# Patient Record
Sex: FEMALE | Race: WHITE | NOT HISPANIC OR LATINO | Employment: FULL TIME | URBAN - NONMETROPOLITAN AREA
[De-identification: names, ages, dates, MRNs, and addresses within clinical notes are randomized per-mention and may not be internally consistent; named-entity substitution may affect disease eponyms.]

---
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
## 2018-01-05 ENCOUNTER — HOSPITAL ENCOUNTER (OUTPATIENT)
Facility: HOSPITAL | Age: 18
Discharge: HOME | End: 2018-01-05
Attending: OBSTETRICS & GYNECOLOGY | Admitting: OBSTETRICS & GYNECOLOGY

## 2018-01-05 VITALS
RESPIRATION RATE: 18 BRPM | HEART RATE: 78 BPM | SYSTOLIC BLOOD PRESSURE: 113 MMHG | HEIGHT: 70 IN | OXYGEN SATURATION: 100 % | WEIGHT: 149.5 LBS | TEMPERATURE: 98.2 F | BODY MASS INDEX: 21.4 KG/M2 | DIASTOLIC BLOOD PRESSURE: 62 MMHG

## 2018-01-05 PROBLEM — R10.9 ABDOMINAL PAIN AFFECTING PREGNANCY: Status: ACTIVE | Noted: 2018-01-05

## 2018-01-05 PROBLEM — O26.899 ABDOMINAL PAIN AFFECTING PREGNANCY: Status: ACTIVE | Noted: 2018-01-05

## 2018-01-05 PROCEDURE — G0463 HOSPITAL OUTPT CLINIC VISIT: HCPCS

## 2018-01-05 PROCEDURE — 99218 PR INITIAL OBSERVATION CARE/DAY 30 MINUTES: CPT | Performed by: OBSTETRICS & GYNECOLOGY

## 2018-01-05 PROCEDURE — 76819 FETAL BIOPHYS PROFIL W/O NST: CPT | Performed by: OBSTETRICS & GYNECOLOGY

## 2018-01-05 RX ORDER — FERROUS SULFATE 325(65) MG
325 TABLET ORAL 2 TIMES DAILY
COMMUNITY
End: 2018-06-26

## 2018-01-05 RX ORDER — NITROFURANTOIN MACROCRYSTALS 100 MG/1
100 CAPSULE ORAL 2 TIMES DAILY
COMMUNITY
End: 2018-03-23 | Stop reason: HOSPADM

## 2018-01-06 NOTE — NURSING NOTE
Patient arrived with complaints of upper abdominal pain that is relieved after vomiting.  Patient reports vomiting 4 times today, but tolerating water well.

## 2018-01-06 NOTE — PROGRESS NOTES
Maya Rodriguez  : 2000  MRN: [id number]  CSN: [id number]    Antepartum Progress Note    Subjective   Lamar Rodriguez is a 17 y.o. year old  with an Estimated Date of Delivery: 3/26/18 currently at 28w4d who initially presented with nausea, vomiting and pain in upper abdomen..  The patient currently reports symptoms improved.  She has not experienced emesis since arrival and says the pain in her upper abdomen has almost completely resolved.  The patient started iron supplementation and abx for a uti both within the past few days.  In addition, the patient also works in a  and has had multiple sick contacts - although she does not have body aches or diarrhea.    Prenatal care has been with Dr. Tammi Talbert.  It has been benign.         Objective     Min/max vitals past 24 hours:   Temp  Min: 98.2 °F (36.8 °C)  Max: 98.2 °F (36.8 °C)  BP  Min: 112/66  Max: 113/62  Pulse  Min: 78  Max: 80  Resp  Min: 18  Max: 18         General: well developed; well nourished  no acute distress  appears stated age   Heart: Not performed.   Lungs: breathing is unlabored   Abdomen: soft, non-tender; no masses  fundus firm and non-tender   FHT's: reassuring and reactive  125 +accels, no decels, moderate variability   Cervix: was not checked.   Contractions: none on monitor   Back: bilateral CVA tenderness is absent           Assessment   1. IUP at 28w4d  2.  Abd pain now resolved.  Patient declines offer for anti-emetic  3.  Patient and her parents advised that etiology could be reaction to antibiotic, effects of iron supplementation, or gastroenteritis.  In addition, patient reports constipation.  Reassured patient that her baby looks great on monitor and that she is not having any contractions.  Patient obviously relieved.     Plan   1. OK for discharge home    Jeny Hirsch MD  2018  9:24 PM

## 2018-02-20 LAB — EXTERNAL GROUP B STREP ANTIGEN: NEGATIVE

## 2018-03-08 ENCOUNTER — HOSPITAL ENCOUNTER (OUTPATIENT)
Facility: HOSPITAL | Age: 18
Setting detail: OBSERVATION
Discharge: HOME | End: 2018-03-08
Attending: OBSTETRICS & GYNECOLOGY | Admitting: OBSTETRICS & GYNECOLOGY

## 2018-03-08 VITALS
WEIGHT: 175.38 LBS | DIASTOLIC BLOOD PRESSURE: 69 MMHG | HEART RATE: 79 BPM | BODY MASS INDEX: 25.11 KG/M2 | SYSTOLIC BLOOD PRESSURE: 125 MMHG | HEIGHT: 70 IN | RESPIRATION RATE: 16 BRPM | TEMPERATURE: 97.2 F

## 2018-03-08 PROCEDURE — G0378 HOSPITAL OBSERVATION PER HR: HCPCS

## 2018-03-08 PROCEDURE — G0463 HOSPITAL OUTPT CLINIC VISIT: HCPCS

## 2018-03-09 PROBLEM — Z34.90 PREGNANCY: Status: ACTIVE | Noted: 2018-03-09

## 2018-03-19 ENCOUNTER — LAB REQUISITION (OUTPATIENT)
Dept: LAB | Facility: HOSPITAL | Age: 18
End: 2018-03-19

## 2018-03-19 DIAGNOSIS — Z00.00 ENCOUNTER FOR GENERAL ADULT MEDICAL EXAMINATION WITHOUT ABNORMAL FINDINGS: ICD-10-CM

## 2018-03-19 LAB
ABO GROUP BLD: NORMAL
BASOPHILS # BLD AUTO: 0.03 10*3/MM3 (ref 0–0.2)
BASOPHILS NFR BLD AUTO: 0.3 % (ref 0–2)
BLD GP AB SCN SERPL QL: NEGATIVE
DEPRECATED RDW RBC AUTO: 48.4 FL (ref 40–54)
EOSINOPHIL # BLD AUTO: 0.13 10*3/MM3 (ref 0–0.7)
EOSINOPHIL NFR BLD AUTO: 1.3 % (ref 0–4)
ERYTHROCYTE [DISTWIDTH] IN BLOOD BY AUTOMATED COUNT: 14.9 % (ref 12–15)
HCT VFR BLD AUTO: 37.4 % (ref 37–47)
HGB BLD-MCNC: 12.1 G/DL (ref 12–16)
IMM GRANULOCYTES # BLD: 0.03 10*3/MM3 (ref 0–0.03)
IMM GRANULOCYTES NFR BLD: 0.3 % (ref 0–5)
LYMPHOCYTES # BLD AUTO: 1.55 10*3/MM3 (ref 0.72–4.86)
LYMPHOCYTES NFR BLD AUTO: 15.2 % (ref 15–45)
MCH RBC QN AUTO: 28.9 PG (ref 28–32)
MCHC RBC AUTO-ENTMCNC: 32.4 G/DL (ref 33–36)
MCV RBC AUTO: 89.5 FL (ref 82–98)
MONOCYTES # BLD AUTO: 0.93 10*3/MM3 (ref 0.19–1.3)
MONOCYTES NFR BLD AUTO: 9.1 % (ref 4–12)
NEUTROPHILS # BLD AUTO: 7.53 10*3/MM3 (ref 1.87–8.4)
NEUTROPHILS NFR BLD AUTO: 73.8 % (ref 39–78)
NRBC BLD MANUAL-RTO: 0 /100 WBC (ref 0–0)
PLATELET # BLD AUTO: 246 10*3/MM3 (ref 130–400)
PMV BLD AUTO: 10.5 FL (ref 6–12)
RBC # BLD AUTO: 4.18 10*6/MM3 (ref 4.2–5.4)
RH BLD: POSITIVE
WBC NRBC COR # BLD: 10.2 10*3/MM3 (ref 4.8–10.8)

## 2018-03-19 PROCEDURE — 86900 BLOOD TYPING SEROLOGIC ABO: CPT | Performed by: OBSTETRICS & GYNECOLOGY

## 2018-03-19 PROCEDURE — 86850 RBC ANTIBODY SCREEN: CPT | Performed by: OBSTETRICS & GYNECOLOGY

## 2018-03-19 PROCEDURE — 85025 COMPLETE CBC W/AUTO DIFF WBC: CPT | Performed by: OBSTETRICS & GYNECOLOGY

## 2018-03-19 PROCEDURE — 86901 BLOOD TYPING SEROLOGIC RH(D): CPT | Performed by: OBSTETRICS & GYNECOLOGY

## 2018-03-20 ENCOUNTER — HOSPITAL ENCOUNTER (INPATIENT)
Facility: HOSPITAL | Age: 18
LOS: 3 days | Discharge: HOME | End: 2018-03-23
Attending: OBSTETRICS & GYNECOLOGY | Admitting: OBSTETRICS & GYNECOLOGY

## 2018-03-20 ENCOUNTER — HOSPITAL ENCOUNTER (OUTPATIENT)
Dept: LABOR AND DELIVERY | Facility: HOSPITAL | Age: 18
Discharge: HOME | End: 2018-03-20

## 2018-03-20 PROBLEM — Z34.90 TERM PREGNANCY: Status: ACTIVE | Noted: 2018-03-20

## 2018-03-20 RX ORDER — MISOPROSTOL 200 UG/1
800 TABLET ORAL AS NEEDED
Status: CANCELLED | OUTPATIENT
Start: 2018-03-20

## 2018-03-20 RX ORDER — METHYLERGONOVINE MALEATE 0.2 MG/ML
200 INJECTION INTRAVENOUS ONCE AS NEEDED
Status: CANCELLED | OUTPATIENT
Start: 2018-03-20

## 2018-03-20 RX ORDER — OXYTOCIN/RINGER'S LACTATE 20/1000 ML
999 PLASTIC BAG, INJECTION (ML) INTRAVENOUS ONCE
Status: COMPLETED | OUTPATIENT
Start: 2018-03-21 | End: 2018-03-21

## 2018-03-20 RX ORDER — LIDOCAINE HYDROCHLORIDE 10 MG/ML
5 INJECTION, SOLUTION EPIDURAL; INFILTRATION; INTRACAUDAL; PERINEURAL AS NEEDED
Status: DISCONTINUED | OUTPATIENT
Start: 2018-03-20 | End: 2018-03-21

## 2018-03-20 RX ORDER — TERBUTALINE SULFATE 1 MG/ML
0.25 INJECTION, SOLUTION SUBCUTANEOUS AS NEEDED
Status: CANCELLED | OUTPATIENT
Start: 2018-03-20

## 2018-03-20 RX ORDER — LIDOCAINE HYDROCHLORIDE 10 MG/ML
5 INJECTION, SOLUTION EPIDURAL; INFILTRATION; INTRACAUDAL; PERINEURAL AS NEEDED
Status: CANCELLED | OUTPATIENT
Start: 2018-03-20

## 2018-03-20 RX ORDER — OXYTOCIN/0.9 % SODIUM CHLORIDE 30/500 ML
2 PLASTIC BAG, INJECTION (ML) INTRAVENOUS
Status: CANCELLED | OUTPATIENT
Start: 2018-03-20 | End: 2018-03-21

## 2018-03-20 RX ORDER — TERBUTALINE SULFATE 1 MG/ML
0.25 INJECTION, SOLUTION SUBCUTANEOUS AS NEEDED
Status: DISCONTINUED | OUTPATIENT
Start: 2018-03-20 | End: 2018-03-21

## 2018-03-20 RX ORDER — SODIUM CHLORIDE 0.9 % (FLUSH) 0.9 %
1-10 SYRINGE (ML) INJECTION AS NEEDED
Status: DISCONTINUED | OUTPATIENT
Start: 2018-03-20 | End: 2018-03-21 | Stop reason: HOSPADM

## 2018-03-20 RX ORDER — SODIUM CHLORIDE, SODIUM LACTATE, POTASSIUM CHLORIDE, CALCIUM CHLORIDE 600; 310; 30; 20 MG/100ML; MG/100ML; MG/100ML; MG/100ML
125 INJECTION, SOLUTION INTRAVENOUS CONTINUOUS
Status: DISCONTINUED | OUTPATIENT
Start: 2018-03-20 | End: 2018-03-23 | Stop reason: HOSPADM

## 2018-03-20 RX ORDER — OXYTOCIN/RINGER'S LACTATE 20/1000 ML
999 PLASTIC BAG, INJECTION (ML) INTRAVENOUS ONCE
Status: CANCELLED | OUTPATIENT
Start: 2018-03-21

## 2018-03-20 RX ORDER — ZOLPIDEM TARTRATE 5 MG/1
5 TABLET ORAL NIGHTLY PRN
Status: DISCONTINUED | OUTPATIENT
Start: 2018-03-20 | End: 2018-03-21 | Stop reason: HOSPADM

## 2018-03-20 RX ORDER — OXYTOCIN/0.9 % SODIUM CHLORIDE 30/500 ML
2-30 PLASTIC BAG, INJECTION (ML) INTRAVENOUS
Status: DISPENSED | OUTPATIENT
Start: 2018-03-20 | End: 2018-03-21

## 2018-03-20 RX ORDER — ZOLPIDEM TARTRATE 5 MG/1
5 TABLET ORAL NIGHTLY PRN
Status: CANCELLED | OUTPATIENT
Start: 2018-03-20 | End: 2018-03-30

## 2018-03-20 RX ORDER — SODIUM CHLORIDE 0.9 % (FLUSH) 0.9 %
1-10 SYRINGE (ML) INJECTION AS NEEDED
Status: CANCELLED | OUTPATIENT
Start: 2018-03-20

## 2018-03-20 RX ORDER — CARBOPROST TROMETHAMINE 250 UG/ML
250 INJECTION, SOLUTION INTRAMUSCULAR AS NEEDED
Status: CANCELLED | OUTPATIENT
Start: 2018-03-20

## 2018-03-20 RX ADMIN — OXYTOCIN-SODIUM CHLORIDE 0.9% IV SOLN 30 UNIT/500ML 2 MILLI-UNITS/MIN: 30-0.9/5 SOLUTION at 21:38

## 2018-03-20 RX ADMIN — SODIUM CHLORIDE, POTASSIUM CHLORIDE, SODIUM LACTATE AND CALCIUM CHLORIDE 125 ML/HR: 600; 310; 30; 20 INJECTION, SOLUTION INTRAVENOUS at 21:22

## 2018-03-20 NOTE — H&P
"        HPI:  This is a 27 year old female who presents for induction of labor at 39+ weeks gestation.  Patient admitted for low dose pitocin cervical ripening.  Prenatal course complicated by S<<D; probable SGA vs IUGR-  Weekly fetal monitoring and interval growth scans.  Other issue during last trimester, gestational edema with proteinuria.  Outpatient 24 hour urine protein received today 700 mg  Other pre eclamptic labs within normal values  Blood pressure stable  Risks, benefits and alternatives of delivery at this time discussed with patient and family with all questions answered.  History of Present Illness    Review of Systems   Constitutional: Negative.    HENT: Negative.    Eyes: Negative.    Respiratory: Negative.    Cardiovascular: Positive for leg swelling.   Gastrointestinal: Negative.    Endocrine: Negative.    Genitourinary: Negative.    Musculoskeletal: Negative.    Skin: Negative.    Allergic/Immunologic: Negative.    Neurological: Negative.    Hematological: Negative.    Psychiatric/Behavioral: Negative.         Past Medical History:   Diagnosis Date   • Chlamydia     prior to pregnancy     Surgical History:  [place] teeth extractions  Family History:  CAD  Ob History:  G1  Social History   Substance Use Topics   • Smoking status: Never Smoker   • Smokeless tobacco: Never Used   • Alcohol use No   NKDA  Medications:  PNV  Objective      Vital Signs  BP:  108/74  P:  90  R:  18  Ht:  70\"  Wt:  179       Physical Exam   Constitutional: She is oriented to person, place, and time. She appears well-developed and well-nourished.   HENT:   Head: Normocephalic.   Eyes: EOM are normal.   Neck: Normal range of motion. Neck supple.   Cardiovascular: Normal rate, regular rhythm, normal heart sounds and intact distal pulses.    Pulmonary/Chest: Effort normal and breath sounds normal.   Abdominal: Soft. Bowel sounds are normal. She exhibits no mass. There is no tenderness. There is no rebound and no guarding. No " hernia.   Gravid abdomen  Non tender  S<<D    Genitourinary: Vagina normal.   Genitourinary Comments: Bimanual exam:  2-3/80/-1   Head well applied to cervix   Musculoskeletal: She exhibits edema.   Neurological: She is alert and oriented to person, place, and time. She has normal reflexes.   Skin: Skin is warm.   Psychiatric: She has a normal mood and affect. Her behavior is normal. Judgment and thought content normal.   Vitals reviewed.      Results Review:  GBS negative    Assessment & Plan     Assessment:  1.  Intrauterine pregnancy at 39 1/7 weeks gestation  2.  Favorable cervix  3.  S<D probable SGA vs IUGR interval growth documented  4.  Negative GBS    Plan:  1.  Admit  2.  Induction of labor      Tammi Talbert DO  03/20/18  8:57 AM

## 2018-03-21 ENCOUNTER — ANESTHESIA EVENT (OUTPATIENT)
Dept: LABOR AND DELIVERY | Facility: HOSPITAL | Age: 18
End: 2018-03-21

## 2018-03-21 ENCOUNTER — ANESTHESIA (OUTPATIENT)
Dept: LABOR AND DELIVERY | Facility: HOSPITAL | Age: 18
End: 2018-03-21

## 2018-03-21 LAB — GLUCOSE BLDC GLUCOMTR-MCNC: 64 MG/DL (ref 70–130)

## 2018-03-21 PROCEDURE — 25010000002 ROPIVACAINE PER 1 MG: Performed by: NURSE ANESTHETIST, CERTIFIED REGISTERED

## 2018-03-21 PROCEDURE — 51703 INSERT BLADDER CATH COMPLEX: CPT

## 2018-03-21 PROCEDURE — 10907ZC DRAINAGE OF AMNIOTIC FLUID, THERAPEUTIC FROM PRODUCTS OF CONCEPTION, VIA NATURAL OR ARTIFICIAL OPENING: ICD-10-PCS | Performed by: OBSTETRICS & GYNECOLOGY

## 2018-03-21 PROCEDURE — C1755 CATHETER, INTRASPINAL: HCPCS | Performed by: NURSE ANESTHETIST, CERTIFIED REGISTERED

## 2018-03-21 PROCEDURE — 25010000002 FENTANYL CITRATE (PF) 100 MCG/2ML SOLUTION: Performed by: NURSE ANESTHETIST, CERTIFIED REGISTERED

## 2018-03-21 PROCEDURE — 0KQM0ZZ REPAIR PERINEUM MUSCLE, OPEN APPROACH: ICD-10-PCS | Performed by: OBSTETRICS & GYNECOLOGY

## 2018-03-21 PROCEDURE — 82962 GLUCOSE BLOOD TEST: CPT

## 2018-03-21 RX ORDER — FENTANYL CITRATE 50 UG/ML
INJECTION, SOLUTION INTRAMUSCULAR; INTRAVENOUS AS NEEDED
Status: DISCONTINUED | OUTPATIENT
Start: 2018-03-21 | End: 2018-03-22 | Stop reason: SURG

## 2018-03-21 RX ORDER — DEXTROSE, SODIUM CHLORIDE, SODIUM LACTATE, POTASSIUM CHLORIDE, AND CALCIUM CHLORIDE 5; .6; .31; .03; .02 G/100ML; G/100ML; G/100ML; G/100ML; G/100ML
999 INJECTION, SOLUTION INTRAVENOUS ONCE
Status: COMPLETED | OUTPATIENT
Start: 2018-03-21 | End: 2018-03-21

## 2018-03-21 RX ORDER — OXYCODONE HYDROCHLORIDE AND ACETAMINOPHEN 5; 325 MG/1; MG/1
TABLET ORAL
Status: COMPLETED
Start: 2018-03-21 | End: 2018-03-21

## 2018-03-21 RX ORDER — LIDOCAINE HYDROCHLORIDE AND EPINEPHRINE 15; 5 MG/ML; UG/ML
INJECTION, SOLUTION EPIDURAL AS NEEDED
Status: DISCONTINUED | OUTPATIENT
Start: 2018-03-21 | End: 2018-03-22 | Stop reason: SURG

## 2018-03-21 RX ORDER — SODIUM CHLORIDE, SODIUM LACTATE, POTASSIUM CHLORIDE, CALCIUM CHLORIDE 600; 310; 30; 20 MG/100ML; MG/100ML; MG/100ML; MG/100ML
INJECTION, SOLUTION INTRAVENOUS CONTINUOUS PRN
Status: DISCONTINUED | OUTPATIENT
Start: 2018-03-21 | End: 2018-03-22 | Stop reason: SURG

## 2018-03-21 RX ORDER — ONDANSETRON 2 MG/ML
4 INJECTION INTRAMUSCULAR; INTRAVENOUS EVERY 6 HOURS PRN
Status: DISCONTINUED | OUTPATIENT
Start: 2018-03-21 | End: 2018-03-22

## 2018-03-21 RX ORDER — METHYLERGONOVINE MALEATE 0.2 MG/ML
200 INJECTION INTRAVENOUS ONCE AS NEEDED
Status: DISCONTINUED | OUTPATIENT
Start: 2018-03-21 | End: 2018-03-21 | Stop reason: HOSPADM

## 2018-03-21 RX ORDER — METHYLERGONOVINE MALEATE 0.2 MG/ML
200 INJECTION INTRAVENOUS ONCE
Status: DISCONTINUED | OUTPATIENT
Start: 2018-03-21 | End: 2018-03-22

## 2018-03-21 RX ORDER — BISACODYL 10 MG
10 SUPPOSITORY, RECTAL RECTAL DAILY PRN
Status: DISCONTINUED | OUTPATIENT
Start: 2018-03-22 | End: 2018-03-23 | Stop reason: HOSPADM

## 2018-03-21 RX ORDER — CARBOPROST TROMETHAMINE 250 UG/ML
250 INJECTION, SOLUTION INTRAMUSCULAR AS NEEDED
Status: DISCONTINUED | OUTPATIENT
Start: 2018-03-21 | End: 2018-03-21 | Stop reason: HOSPADM

## 2018-03-21 RX ORDER — OXYCODONE HYDROCHLORIDE AND ACETAMINOPHEN 5; 325 MG/1; MG/1
1 TABLET ORAL EVERY 4 HOURS PRN
Status: DISCONTINUED | OUTPATIENT
Start: 2018-03-21 | End: 2018-03-23 | Stop reason: HOSPADM

## 2018-03-21 RX ORDER — ROPIVACAINE HYDROCHLORIDE 2 MG/ML
INJECTION, SOLUTION EPIDURAL; INFILTRATION; PERINEURAL AS NEEDED
Status: DISCONTINUED | OUTPATIENT
Start: 2018-03-21 | End: 2018-03-22 | Stop reason: SURG

## 2018-03-21 RX ORDER — MISOPROSTOL 200 UG/1
600 TABLET ORAL ONCE
Status: DISCONTINUED | OUTPATIENT
Start: 2018-03-21 | End: 2018-03-22

## 2018-03-21 RX ORDER — SODIUM CHLORIDE 0.9 % (FLUSH) 0.9 %
1-10 SYRINGE (ML) INJECTION AS NEEDED
Status: DISCONTINUED | OUTPATIENT
Start: 2018-03-21 | End: 2018-03-22

## 2018-03-21 RX ORDER — IBUPROFEN 800 MG/1
800 TABLET ORAL EVERY 6 HOURS PRN
Status: DISCONTINUED | OUTPATIENT
Start: 2018-03-21 | End: 2018-03-23 | Stop reason: HOSPADM

## 2018-03-21 RX ORDER — MISOPROSTOL 200 UG/1
800 TABLET ORAL AS NEEDED
Status: DISCONTINUED | OUTPATIENT
Start: 2018-03-21 | End: 2018-03-21 | Stop reason: HOSPADM

## 2018-03-21 RX ORDER — CARBOPROST TROMETHAMINE 250 UG/ML
250 INJECTION, SOLUTION INTRAMUSCULAR ONCE
Status: DISCONTINUED | OUTPATIENT
Start: 2018-03-21 | End: 2018-03-22

## 2018-03-21 RX ORDER — EPHEDRINE SULFATE 50 MG/ML
5 INJECTION, SOLUTION INTRAVENOUS
Status: DISCONTINUED | OUTPATIENT
Start: 2018-03-21 | End: 2018-03-21

## 2018-03-21 RX ORDER — ONDANSETRON 4 MG/1
4 TABLET, FILM COATED ORAL EVERY 8 HOURS PRN
Status: DISCONTINUED | OUTPATIENT
Start: 2018-03-21 | End: 2018-03-23 | Stop reason: HOSPADM

## 2018-03-21 RX ORDER — ACETAMINOPHEN 325 MG/1
650 TABLET ORAL EVERY 4 HOURS PRN
Status: DISCONTINUED | OUTPATIENT
Start: 2018-03-21 | End: 2018-03-23 | Stop reason: HOSPADM

## 2018-03-21 RX ADMIN — SODIUM CHLORIDE, POTASSIUM CHLORIDE, SODIUM LACTATE AND CALCIUM CHLORIDE: 600; 310; 30; 20 INJECTION, SOLUTION INTRAVENOUS at 09:10

## 2018-03-21 RX ADMIN — OXYTOCIN 999 ML/HR: 10 INJECTION INTRAVENOUS at 18:44

## 2018-03-21 RX ADMIN — SODIUM CHLORIDE, POTASSIUM CHLORIDE, SODIUM LACTATE AND CALCIUM CHLORIDE 125 ML/HR: 600; 310; 30; 20 INJECTION, SOLUTION INTRAVENOUS at 09:30

## 2018-03-21 RX ADMIN — OXYCODONE HYDROCHLORIDE AND ACETAMINOPHEN 1 TABLET: 5; 325 TABLET ORAL at 21:17

## 2018-03-21 RX ADMIN — FENTANYL CITRATE 18 MCG: 50 INJECTION INTRAMUSCULAR; INTRAVENOUS at 09:40

## 2018-03-21 RX ADMIN — ROPIVACAINE HYDROCHLORIDE 250 ML/HR: 2 INJECTION, SOLUTION EPIDURAL; INFILTRATION at 09:48

## 2018-03-21 RX ADMIN — SODIUM CHLORIDE, POTASSIUM CHLORIDE, SODIUM LACTATE AND CALCIUM CHLORIDE 125 ML/HR: 600; 310; 30; 20 INJECTION, SOLUTION INTRAVENOUS at 23:39

## 2018-03-21 RX ADMIN — SODIUM CHLORIDE, POTASSIUM CHLORIDE, SODIUM LACTATE AND CALCIUM CHLORIDE 125 ML/HR: 600; 310; 30; 20 INJECTION, SOLUTION INTRAVENOUS at 10:02

## 2018-03-21 RX ADMIN — SODIUM CHLORIDE, SODIUM LACTATE, POTASSIUM CHLORIDE, CALCIUM CHLORIDE AND DEXTROSE MONOHYDRATE 999 ML/HR: 5; 600; 310; 30; 20 INJECTION, SOLUTION INTRAVENOUS at 14:34

## 2018-03-21 RX ADMIN — ROPIVACAINE HYDROCHLORIDE 9 ML: 2 INJECTION, SOLUTION EPIDURAL; INFILTRATION at 09:37

## 2018-03-21 RX ADMIN — SODIUM CHLORIDE, POTASSIUM CHLORIDE, SODIUM LACTATE AND CALCIUM CHLORIDE 125 ML/HR: 600; 310; 30; 20 INJECTION, SOLUTION INTRAVENOUS at 08:46

## 2018-03-21 RX ADMIN — LIDOCAINE HYDROCHLORIDE AND EPINEPHRINE 5 ML: 15; 5 INJECTION, SOLUTION EPIDURAL at 09:36

## 2018-03-21 RX ADMIN — FENTANYL CITRATE 482 MCG: 50 INJECTION INTRAMUSCULAR; INTRAVENOUS at 09:50

## 2018-03-21 RX ADMIN — LIDOCAINE HYDROCHLORIDE 5 ML: 10 INJECTION, SOLUTION EPIDURAL; INFILTRATION; INTRACAUDAL; PERINEURAL at 09:13

## 2018-03-21 NOTE — PLAN OF CARE
Problem: Patient Care Overview  Goal: Plan of Care Review  Outcome: Ongoing (interventions implemented as appropriate)   03/21/18 0459   Coping/Psychosocial   Plan of Care Reviewed With patient;mother   Plan of Care Review   Progress no change     Goal: Individualization and Mutuality  Outcome: Ongoing (interventions implemented as appropriate)    Goal: Discharge Needs Assessment  Outcome: Ongoing (interventions implemented as appropriate)    Goal: Interprofessional Rounds/Family Conf  Outcome: Ongoing (interventions implemented as appropriate)      Problem: Labor (Cervical Ripen, Induct, Augment) (Adult,Obstetrics,Pediatric)  Goal: Signs and Symptoms of Listed Potential Problems Will be Absent, Minimized or Managed (Labor)  Outcome: Ongoing (interventions implemented as appropriate)

## 2018-03-21 NOTE — OP NOTE
[facility]  Vaginal Delivery Note    Delivery     Delivery: Vaginal, Spontaneous Delivery     YOB: 2020    Time of Birth: 6:38 PM      Anesthesia: Epidural     Delivering clinician:      Forceps?   No   Vacuum? No    Shoulder dystocia present: No        Delivery narrative:    Patient progressed to complete and pushing.  Delivered a viable  Female from a SONDRA presentation.  7-3 lbs.  Placenta spontaneously expressed grossly intact 3 vessel cord  Midline second degree laceration  Epidural    Infant    Findings: female  infant     Infant observations: Weight7-3:   Observations/Comments:         Apgars:    @ 1 minute /       @ 5 minutes     Placenta, Cord, and Fluid    Placenta delivered     at         Cord:    present.   Nuchal Cord?  no   Cord blood obtained:      Cord gases obtained:       Cord gas results: Venous:  No results found for: PHCVEN    Arterial:  No results found for: PHCART     Repair    Episiotomy: Not recorded    Lacerations:  Midline second degree    Estimated Blood Loss: 150              Complications  none    Disposition  Mother to Remain in LD  in stable condition currently.  Baby to remains with mom  in stable condition currently.      Tammi Talbert DO  03/21/18  6:52 PM

## 2018-03-21 NOTE — ANESTHESIA PROCEDURE NOTES
Labor Epidural    Patient location during procedure: OB  Start Time: 3/21/2018 9:35 AM  Indication:at surgeon's request  Performed By  CRNA: ALE MASTERSON  Preanesthetic Checklist  Completed: patient identified, site marked, surgical consent, pre-op evaluation, timeout performed, IV checked, risks and benefits discussed and monitors and equipment checked  Additional Notes  1st attempt blood on catheter 1 level higher was succecful,  Prep:  Pt Position:sitting  Sterile Tech:gloves and sterile barrier  Prep:chlorhexidine gluconate and isopropyl alcohol  Monitoring:continuous pulse oximetry and blood pressure monitoring  Epidural Block Procedure:  Approach:midline  Guidance:palpation technique  Location:L3-L4  Needle Type:Tuohy  Needle Gauge:18 G  Loss of Resistance Medium: saline  Loss of Resistance: 7cm  Cath Depth at skin:8 cm  Paresthesia: none  Aspiration:negative  Test Dose:negative  Post Assessment:  Dressing:occlusive dressing applied and secured with tape  Pt Tolerance:patient tolerated the procedure well with no apparent complications  Complications:no

## 2018-03-21 NOTE — ANESTHESIA PREPROCEDURE EVALUATION
Anesthesia Evaluation     Patient summary reviewed and Nursing notes reviewed                Airway   Mallampati: II  TM distance: >3 FB  Neck ROM: full  No difficulty expected  Dental - normal exam     Pulmonary - negative pulmonary ROS and normal exam   Cardiovascular - negative cardio ROS and normal exam        Neuro/Psych- negative ROS  GI/Hepatic/Renal/Endo - negative ROS     Musculoskeletal (-) negative ROS    Abdominal    Substance History - negative use     OB/GYN    (+) Pregnant,         Other                        Anesthesia Plan    ASA 2 - emergent     epidural     Anesthetic plan and risks discussed with patient.

## 2018-03-21 NOTE — PROGRESS NOTES
Patient states contractions of mild to moderate intensity  Reactive fetal tracing  Irregular contractions  VSS  Bimanual:  3/80/--2-1  Head well applied  Amniotomy with scant blood tinged fluid  Epidural prn  Pit aug

## 2018-03-22 LAB
HCT VFR BLD AUTO: 32.5 % (ref 37–47)
HGB BLD-MCNC: 11 G/DL (ref 12–16)

## 2018-03-22 PROCEDURE — 85014 HEMATOCRIT: CPT | Performed by: OBSTETRICS & GYNECOLOGY

## 2018-03-22 PROCEDURE — 85018 HEMOGLOBIN: CPT | Performed by: OBSTETRICS & GYNECOLOGY

## 2018-03-22 RX ADMIN — POLYETHYLENE GLYCOL 3350 17 G: 17 POWDER, FOR SOLUTION ORAL at 09:27

## 2018-03-22 RX ADMIN — IBUPROFEN 800 MG: 800 TABLET ORAL at 23:38

## 2018-03-22 RX ADMIN — OXYCODONE HYDROCHLORIDE AND ACETAMINOPHEN 1 TABLET: 5; 325 TABLET ORAL at 13:21

## 2018-03-22 RX ADMIN — OXYCODONE HYDROCHLORIDE AND ACETAMINOPHEN 1 TABLET: 5; 325 TABLET ORAL at 23:38

## 2018-03-22 RX ADMIN — OXYCODONE HYDROCHLORIDE AND ACETAMINOPHEN 1 TABLET: 5; 325 TABLET ORAL at 18:50

## 2018-03-22 RX ADMIN — IBUPROFEN 800 MG: 800 TABLET ORAL at 03:19

## 2018-03-22 RX ADMIN — OXYCODONE HYDROCHLORIDE AND ACETAMINOPHEN 1 TABLET: 5; 325 TABLET ORAL at 03:19

## 2018-03-22 NOTE — PLAN OF CARE
Problem: Patient Care Overview  Goal: Plan of Care Review  Outcome: Ongoing (interventions implemented as appropriate)   03/22/18 1248   Coping/Psychosocial   Plan of Care Reviewed With patient   Plan of Care Review   Progress improving   OTHER   Outcome Summary FFU2ML, scant lochia, no clots, no odor, VSS, voiding, breastfeeding well, 2nd degree laceration, mother at bedside,      Goal: Individualization and Mutuality  Outcome: Ongoing (interventions implemented as appropriate)    Goal: Discharge Needs Assessment  Outcome: Ongoing (interventions implemented as appropriate)    Goal: Interprofessional Rounds/Family Conf  Outcome: Ongoing (interventions implemented as appropriate)      Problem: Postpartum (Vaginal Delivery) (Adult,Obstetrics,Pediatric)  Goal: Signs and Symptoms of Listed Potential Problems Will be Absent, Minimized or Managed (Postpartum)  Outcome: Ongoing (interventions implemented as appropriate)      Problem: Breastfeeding (Adult,Obstetrics,Pediatric)  Goal: Signs and Symptoms of Listed Potential Problems Will be Absent, Minimized or Managed (Breastfeeding)  Outcome: Ongoing (interventions implemented as appropriate)

## 2018-03-22 NOTE — PLAN OF CARE
Problem: Patient Care Overview  Goal: Individualization and Mutuality  Outcome: Ongoing (interventions implemented as appropriate)   03/22/18 1004   Individualization   Patient Specific Preferences Exclusively breastfeed infant   Patient Specific Goals (Include Timeframe) Provide breast milk exclusively to infant   Patient Specific Interventions Provide breastfeeding Support, Encouragement, Education and Assistance     Goal: Discharge Needs Assessment  Outcome: Ongoing (interventions implemented as appropriate)   03/22/18 1004   Discharge Needs Assessment   Discharge Coordination/Progress RX faxed to [facility] per Pts request does not have breast pump for home use

## 2018-03-22 NOTE — PLAN OF CARE
Problem: Patient Care Overview  Goal: Plan of Care Review  Outcome: Ongoing (interventions implemented as appropriate)   03/22/18 0358   Coping/Psychosocial   Plan of Care Reviewed With patient   Plan of Care Review   Progress improving   VSS,ff,u2-u3, small lochia no clots nor odor. Voiding and ambulating in room. LR @ 125ml/hr infusing. Percocet and Motrin for pain. Breastfeeding well.  Goal: Individualization and Mutuality  Outcome: Ongoing (interventions implemented as appropriate)    Goal: Discharge Needs Assessment  Outcome: Ongoing (interventions implemented as appropriate)      Problem: Postpartum (Vaginal Delivery) (Adult,Obstetrics,Pediatric)  Goal: Signs and Symptoms of Listed Potential Problems Will be Absent, Minimized or Managed (Postpartum)  Outcome: Ongoing (interventions implemented as appropriate)

## 2018-03-22 NOTE — ANESTHESIA POSTPROCEDURE EVALUATION
Patient: Lamar Rodriguez    Procedure Summary     Date:  03/21/18 Room / Location:      Anesthesia Start:  0913 Anesthesia Stop:  1838    Procedure:  LABOR ANALGESIA Diagnosis:      Scheduled Providers:   Provider:  Juanjo Andrew CRNA    Anesthesia Type:  epidural ASA Status:  2 - Emergent          Anesthesia Type: epidural  Last vitals  BP   117/69 (03/22/18 1600)   Temp   97.8 °F (36.6 °C) (03/22/18 1600)   Pulse   77 (03/22/18 1600)   Resp   18 (03/22/18 1600)     SpO2   99 % (03/22/18 1600)     Post Anesthesia Care and Evaluation    Patient location during evaluation: bedside  Patient participation: complete - patient participated  Anesthetic complications: No anesthetic complications  Post Neuraxial Block status: Motor and sensory function returned to baseline and No signs or symptoms of PDPH

## 2018-03-22 NOTE — LACTATION NOTE
, 39  2/7 week gestation, infant girl delivered 3/21/18 @ 1838, birth weight 7 lbs 3.3 oz via spontaneous vaginal delivery. Pt desires to exclusively breastfeed. Pt requested lactation visit, infant at breast, breastfeeding without difficulty in cradle position. Observed latch, education completed on lips flanged out while at breast, demonstrated how to flip lower lip without breaking latch, pt did teach back. Education completed, see education. Initial breastfeeding packet, Breastfeeding A Great Start book and menu given/reviewed. Pt and Pts mother asked appropriate questions, denies any other questions or concerns at this time. Wrote lactation number on communication board, encouraged pt to call if needs assistance, instructed on lactation services available here at [facility]. Provided support and encouargement.      RX faxed to [facility] for breast pump for home use per pts request

## 2018-03-22 NOTE — PROGRESS NOTES
"[facility]  Vaginal Delivery Progress Note    Subjective   Postpartum Day 1: Vaginal Delivery    The patient feels well.  Her pain is well controlled with nonsteroidal anti-inflammatory drugs.   She is ambulating well.  Patient describes her bleeding as no bleeding.    Breastfeeding: infant latching.    Objective     Vital Signs Range for the last 24 hours  Temperature: Temp:  [97.3 °F (36.3 °C)-99.4 °F (37.4 °C)] 97.8 °F (36.6 °C)   Temp Source: Temp src: Oral   BP: BP: ()/() 112/77   Pulse: Heart Rate:  [] 79   Respirations: Resp:  [16-18] 18   SPO2: SpO2:  [97 %-100 %] 100 %   O2 Amount (l/min):     O2 Devices Device (Oxygen Therapy): room air   Weight:       Admit Height:  Height: 177.8 cm (70\")      Physical Exam:  General:  no acute distresss.  Abdomen: abdomen is soft without significant tenderness, masses, organomegaly or guarding. Fundus: appropriate, firm, non tender  Extremities: normal, atraumatic, no cyanosis, and trace edema.       Lab results reviewed:  Yes   Rubella:  No results found for: RUBELLAIGGIN Nurse Transcribed from prenatal record --    External Rubella Qual   Date Value Ref Range Status   09/26/2017 Immune  Final     Rh Status:    RH type   Date Value Ref Range Status   03/19/2018 Positive  Final     Immunizations: There is no immunization history for the selected administration types on file for this patient.    Assessment/Plan     Active Problems:    Term pregnancy      Lamar Rodriguez is Day 1  post-partum  Vaginal, Spontaneous Delivery    .      Plan:  Continue current care Rubella Non Immune: needs immunization Rh negative: needs Rh Immunoglobulin Discharge home with standard precautions and return to clinic in 4-6 weeks.      Tammi Talbert DO  3/22/2018  12:51 PM  "

## 2018-03-23 VITALS
HEART RATE: 70 BPM | TEMPERATURE: 98.1 F | DIASTOLIC BLOOD PRESSURE: 72 MMHG | SYSTOLIC BLOOD PRESSURE: 116 MMHG | HEIGHT: 70 IN | WEIGHT: 175.38 LBS | OXYGEN SATURATION: 100 % | BODY MASS INDEX: 25.11 KG/M2 | RESPIRATION RATE: 16 BRPM

## 2018-03-23 PROCEDURE — 90471 IMMUNIZATION ADMIN: CPT | Performed by: OBSTETRICS & GYNECOLOGY

## 2018-03-23 PROCEDURE — 25010000002 TDAP 5-2.5-18.5 LF-MCG/0.5 SUSPENSION: Performed by: OBSTETRICS & GYNECOLOGY

## 2018-03-23 PROCEDURE — 90715 TDAP VACCINE 7 YRS/> IM: CPT | Performed by: OBSTETRICS & GYNECOLOGY

## 2018-03-23 RX ORDER — IBUPROFEN 800 MG/1
800 TABLET ORAL EVERY 6 HOURS PRN
Qty: 40 TABLET | Refills: 0 | Status: SHIPPED | OUTPATIENT
Start: 2018-03-23 | End: 2018-04-22

## 2018-03-23 RX ORDER — ONDANSETRON 4 MG/1
4 TABLET, FILM COATED ORAL EVERY 8 HOURS PRN
Qty: 30 TABLET | Refills: 0 | Status: SHIPPED | OUTPATIENT
Start: 2018-03-23 | End: 2018-04-22

## 2018-03-23 RX ORDER — OXYCODONE HYDROCHLORIDE AND ACETAMINOPHEN 5; 325 MG/1; MG/1
1 TABLET ORAL EVERY 4 HOURS PRN
Qty: 30 TABLET | Refills: 0 | Status: SHIPPED | OUTPATIENT
Start: 2018-03-23 | End: 2018-03-31

## 2018-03-23 RX ADMIN — POLYETHYLENE GLYCOL 3350 17 G: 17 POWDER, FOR SOLUTION ORAL at 08:15

## 2018-03-23 RX ADMIN — OXYCODONE HYDROCHLORIDE AND ACETAMINOPHEN 1 TABLET: 5; 325 TABLET ORAL at 08:48

## 2018-03-23 RX ADMIN — TETANUS TOXOID, REDUCED DIPHTHERIA TOXOID AND ACELLULAR PERTUSSIS VACCINE, ADSORBED 0.5 ML: 5; 2.5; 8; 8; 2.5 SUSPENSION INTRAMUSCULAR at 08:48

## 2018-03-23 RX ADMIN — OXYCODONE HYDROCHLORIDE AND ACETAMINOPHEN 1 TABLET: 5; 325 TABLET ORAL at 14:49

## 2018-03-23 RX ADMIN — IBUPROFEN 800 MG: 800 TABLET ORAL at 14:49

## 2018-03-23 NOTE — LACTATION NOTE
This note was copied from a baby's chart.  Follow up visit with mom to answer questions about breast pump use and going back to work, education completed. Mom denies any other questions at this time. Provided support and encouragement.

## 2018-03-23 NOTE — LACTATION NOTE
This note was copied from a baby's chart.  39 2/7 week infant, Norberto, delivered 3/21/18 @ 1838. Birth weight 7 lb 3.3 oz (3270 g). Day 2 weight loss -3%. 5 wet, 4 stool, and 8 breastfeeds in the past 24 hours. Assisted mother with turning infant to position belly to belly, easing latching. Discussed Tdap vaccine per patient request. Reviewed feeding plan after dc, use of hand pump, encouraged hand expression, feeding log, expected I&O, follow up, weight loss/gain, and offered support. Planning to go home later today.    Pump: electric pump at home (shower gift) unsure of brand

## 2018-03-23 NOTE — DISCHARGE SUMMARY
[facility]  Delivery Discharge Summary  Lamar Rodriguez    Primary OB Clinician: Gali    EDC: Estimated Date of Delivery: 3/26/18    Gestational Age:39w2d    Antepartum complications: proteinuria    Date of Delivery: 3/21/2018   Time of Delivery: 6:38 PM     Delivered By:  Tammi Talbert     Delivery Type: Vaginal, Spontaneous Delivery      Feeding method: Breast feeding    Rh Immune globulin given: not applicable    Rubella vaccine given: not applicable    Discharge Date: 3/23/2018; Discharge Time: 8:03 AM    Early Discharge:  NO    Plan:    Address and phone number verified and same.  Follow-up appointment with Gali

## 2018-06-26 PROCEDURE — 85025 COMPLETE CBC W/AUTO DIFF WBC: CPT | Performed by: NURSE PRACTITIONER

## 2018-06-26 PROCEDURE — 80053 COMPREHEN METABOLIC PANEL: CPT | Performed by: NURSE PRACTITIONER

## 2018-06-26 PROCEDURE — 86618 LYME DISEASE ANTIBODY: CPT | Performed by: NURSE PRACTITIONER

## 2018-06-26 PROCEDURE — 87798 DETECT AGENT NOS DNA AMP: CPT | Performed by: NURSE PRACTITIONER

## 2018-06-26 PROCEDURE — 86757 RICKETTSIA ANTIBODY: CPT | Performed by: NURSE PRACTITIONER

## 2019-01-23 ENCOUNTER — TRANSCRIBE ORDERS (OUTPATIENT)
Dept: ADMINISTRATIVE | Facility: HOSPITAL | Age: 19
End: 2019-01-23

## 2019-01-23 ENCOUNTER — LAB (OUTPATIENT)
Dept: LAB | Facility: HOSPITAL | Age: 19
End: 2019-01-23

## 2019-01-23 DIAGNOSIS — R10.84 GENERALIZED ABDOMINAL PAIN: Primary | ICD-10-CM

## 2019-01-23 DIAGNOSIS — R10.84 GENERALIZED ABDOMINAL PAIN: ICD-10-CM

## 2019-01-23 LAB
ALBUMIN SERPL-MCNC: 4.3 G/DL (ref 3.5–5)
ALBUMIN/GLOB SERPL: 1 G/DL (ref 1.1–2.5)
ALP SERPL-CCNC: 99 U/L (ref 24–120)
ALT SERPL W P-5'-P-CCNC: 18 U/L (ref 0–54)
ANION GAP SERPL CALCULATED.3IONS-SCNC: 12 MMOL/L (ref 4–13)
AST SERPL-CCNC: 26 U/L (ref 7–45)
AUTO MIXED CELLS #: 0.4 10*3/MM3 (ref 0.1–2.6)
AUTO MIXED CELLS %: 6.7 % (ref 0.1–24)
B-HCG UR QL: NEGATIVE
BACTERIA UR QL AUTO: ABNORMAL /HPF
BILIRUB SERPL-MCNC: 0.6 MG/DL (ref 0.1–1)
BILIRUB UR QL STRIP: ABNORMAL
BUN BLD-MCNC: 19 MG/DL (ref 5–21)
BUN/CREAT SERPL: 22.9
CALCIUM SPEC-SCNC: 9.2 MG/DL (ref 8.4–10.4)
CHLORIDE SERPL-SCNC: 101 MMOL/L (ref 98–110)
CLARITY UR: CLEAR
CO2 SERPL-SCNC: 25 MMOL/L (ref 24–31)
COLOR UR: YELLOW
CREAT BLD-MCNC: 0.83 MG/DL (ref 0.5–1.4)
ERYTHROCYTE [DISTWIDTH] IN BLOOD BY AUTOMATED COUNT: 13.3 % (ref 12–15)
GFR SERPL CREATININE-BSD FRML MDRD: 88 ML/MIN/1.73
GLOBULIN UR ELPH-MCNC: 4.2 GM/DL
GLUCOSE BLD-MCNC: 108 MG/DL (ref 70–100)
GLUCOSE UR STRIP-MCNC: NEGATIVE MG/DL
HCT VFR BLD AUTO: 48.3 % (ref 37–47)
HGB BLD-MCNC: 16.7 G/DL (ref 12–16)
HGB UR QL STRIP.AUTO: NEGATIVE
HYALINE CASTS UR QL AUTO: ABNORMAL /LPF
KETONES UR QL STRIP: ABNORMAL
LEUKOCYTE ESTERASE UR QL STRIP.AUTO: ABNORMAL
LYMPHOCYTES # BLD AUTO: 1.8 10*3/MM3 (ref 0.8–7)
LYMPHOCYTES NFR BLD AUTO: 29.5 % (ref 15–45)
MCH RBC QN AUTO: 29.5 PG (ref 28–32)
MCHC RBC AUTO-ENTMCNC: 34.6 G/DL (ref 33–36)
MCV RBC AUTO: 85.2 FL (ref 82–98)
MUCOUS THREADS URNS QL MICRO: ABNORMAL /HPF
NEUTROPHILS # BLD AUTO: 3.8 10*3/MM3 (ref 1.5–8.3)
NEUTROPHILS NFR BLD AUTO: 63.8 % (ref 39–78)
NITRITE UR QL STRIP: NEGATIVE
PH UR STRIP.AUTO: 6 [PH] (ref 5–8)
PLATELET # BLD AUTO: 240 10*3/MM3 (ref 130–400)
PMV BLD AUTO: 9.1 FL (ref 6–12)
POTASSIUM BLD-SCNC: 4.2 MMOL/L (ref 3.5–5.3)
PROT SERPL-MCNC: 8.5 G/DL (ref 6.3–8.7)
PROT UR QL STRIP: ABNORMAL
RBC # BLD AUTO: 5.67 10*6/MM3 (ref 4.2–5.4)
RBC # UR: ABNORMAL /HPF
REF LAB TEST METHOD: ABNORMAL
SODIUM BLD-SCNC: 138 MMOL/L (ref 135–145)
SP GR UR STRIP: >=1.03 (ref 1–1.03)
SQUAMOUS #/AREA URNS HPF: ABNORMAL /HPF
TRICHOMONAS #/AREA URNS HPF: ABNORMAL /HPF
UROBILINOGEN UR QL STRIP: ABNORMAL
WBC NRBC COR # BLD: 6 10*3/MM3 (ref 4.8–10.8)
WBC UR QL AUTO: ABNORMAL /HPF

## 2019-01-23 PROCEDURE — 81025 URINE PREGNANCY TEST: CPT

## 2019-01-23 PROCEDURE — 85025 COMPLETE CBC W/AUTO DIFF WBC: CPT

## 2019-01-23 PROCEDURE — 80053 COMPREHEN METABOLIC PANEL: CPT

## 2019-01-23 PROCEDURE — 36415 COLL VENOUS BLD VENIPUNCTURE: CPT

## 2019-01-23 PROCEDURE — 81001 URINALYSIS AUTO W/SCOPE: CPT

## 2019-01-23 PROCEDURE — 87086 URINE CULTURE/COLONY COUNT: CPT | Performed by: NURSE PRACTITIONER

## 2019-01-23 PROCEDURE — 86140 C-REACTIVE PROTEIN: CPT | Performed by: NURSE PRACTITIONER

## 2019-01-24 LAB — CRP SERPL-MCNC: 2.12 MG/DL (ref 0–0.99)

## 2019-01-26 LAB — BACTERIA SPEC AEROBE CULT: ABNORMAL

## 2019-11-05 ENCOUNTER — TELEPHONE (OUTPATIENT)
Dept: FAMILY MEDICINE CLINIC | Age: 19
End: 2019-11-05

## 2019-11-05 ENCOUNTER — OFFICE VISIT (OUTPATIENT)
Dept: FAMILY MEDICINE CLINIC | Age: 19
End: 2019-11-05
Payer: OTHER GOVERNMENT

## 2019-11-05 VITALS
TEMPERATURE: 98.2 F | HEIGHT: 70 IN | BODY MASS INDEX: 22.73 KG/M2 | WEIGHT: 158.8 LBS | OXYGEN SATURATION: 99 % | DIASTOLIC BLOOD PRESSURE: 72 MMHG | HEART RATE: 68 BPM | SYSTOLIC BLOOD PRESSURE: 118 MMHG

## 2019-11-05 DIAGNOSIS — Z87.42 HX OF ABNORMAL CERVICAL PAP SMEAR: ICD-10-CM

## 2019-11-05 DIAGNOSIS — Z13.31 POSITIVE DEPRESSION SCREENING: ICD-10-CM

## 2019-11-05 DIAGNOSIS — Z01.419 WOMEN'S ANNUAL ROUTINE GYNECOLOGICAL EXAMINATION: ICD-10-CM

## 2019-11-05 DIAGNOSIS — F41.8 DEPRESSION WITH ANXIETY: ICD-10-CM

## 2019-11-05 DIAGNOSIS — Z23 FLU VACCINE NEED: Primary | ICD-10-CM

## 2019-11-05 DIAGNOSIS — Z76.89 ENCOUNTER TO ESTABLISH CARE: ICD-10-CM

## 2019-11-05 PROCEDURE — 90686 IIV4 VACC NO PRSV 0.5 ML IM: CPT | Performed by: NURSE PRACTITIONER

## 2019-11-05 PROCEDURE — G0444 DEPRESSION SCREEN ANNUAL: HCPCS | Performed by: NURSE PRACTITIONER

## 2019-11-05 PROCEDURE — 90471 IMMUNIZATION ADMIN: CPT | Performed by: NURSE PRACTITIONER

## 2019-11-05 PROCEDURE — G8431 POS CLIN DEPRES SCRN F/U DOC: HCPCS | Performed by: NURSE PRACTITIONER

## 2019-11-05 PROCEDURE — 99203 OFFICE O/P NEW LOW 30 MIN: CPT | Performed by: NURSE PRACTITIONER

## 2019-11-05 RX ORDER — DESVENLAFAXINE 50 MG/1
50 TABLET, EXTENDED RELEASE ORAL DAILY
Qty: 30 TABLET | Refills: 5 | Status: SHIPPED | OUTPATIENT
Start: 2019-11-05

## 2019-11-05 ASSESSMENT — ENCOUNTER SYMPTOMS
ABDOMINAL PAIN: 0
NAUSEA: 0
WHEEZING: 0
COUGH: 0
VOMITING: 0
CHEST TIGHTNESS: 0
SHORTNESS OF BREATH: 0
SORE THROAT: 0
DIARRHEA: 0

## 2019-11-05 ASSESSMENT — PATIENT HEALTH QUESTIONNAIRE - PHQ9
SUM OF ALL RESPONSES TO PHQ QUESTIONS 1-9: 15
SUM OF ALL RESPONSES TO PHQ9 QUESTIONS 1 & 2: 3
8. MOVING OR SPEAKING SO SLOWLY THAT OTHER PEOPLE COULD HAVE NOTICED. OR THE OPPOSITE, BEING SO FIGETY OR RESTLESS THAT YOU HAVE BEEN MOVING AROUND A LOT MORE THAN USUAL: 0
4. FEELING TIRED OR HAVING LITTLE ENERGY: 3
5. POOR APPETITE OR OVEREATING: 0
SUM OF ALL RESPONSES TO PHQ QUESTIONS 1-9: 15
6. FEELING BAD ABOUT YOURSELF - OR THAT YOU ARE A FAILURE OR HAVE LET YOURSELF OR YOUR FAMILY DOWN: 3
1. LITTLE INTEREST OR PLEASURE IN DOING THINGS: 1
3. TROUBLE FALLING OR STAYING ASLEEP: 3
9. THOUGHTS THAT YOU WOULD BE BETTER OFF DEAD, OR OF HURTING YOURSELF: 0
10. IF YOU CHECKED OFF ANY PROBLEMS, HOW DIFFICULT HAVE THESE PROBLEMS MADE IT FOR YOU TO DO YOUR WORK, TAKE CARE OF THINGS AT HOME, OR GET ALONG WITH OTHER PEOPLE: 3
2. FEELING DOWN, DEPRESSED OR HOPELESS: 2
7. TROUBLE CONCENTRATING ON THINGS, SUCH AS READING THE NEWSPAPER OR WATCHING TELEVISION: 3

## 2019-12-06 ENCOUNTER — OFFICE VISIT (OUTPATIENT)
Dept: FAMILY MEDICINE CLINIC | Age: 19
End: 2019-12-06
Payer: OTHER GOVERNMENT

## 2019-12-06 VITALS
HEIGHT: 70 IN | OXYGEN SATURATION: 98 % | WEIGHT: 155 LBS | RESPIRATION RATE: 18 BRPM | BODY MASS INDEX: 22.19 KG/M2 | TEMPERATURE: 96.5 F | SYSTOLIC BLOOD PRESSURE: 96 MMHG | HEART RATE: 76 BPM | DIASTOLIC BLOOD PRESSURE: 64 MMHG

## 2019-12-06 DIAGNOSIS — F41.8 DEPRESSION WITH ANXIETY: ICD-10-CM

## 2019-12-06 DIAGNOSIS — Z00.00 WELL ADULT EXAM: Primary | ICD-10-CM

## 2019-12-06 PROCEDURE — 99213 OFFICE O/P EST LOW 20 MIN: CPT | Performed by: NURSE PRACTITIONER

## 2019-12-06 ASSESSMENT — ENCOUNTER SYMPTOMS
ABDOMINAL PAIN: 0
SHORTNESS OF BREATH: 0
WHEEZING: 0
DIARRHEA: 0
CHEST TIGHTNESS: 0
NAUSEA: 0
COUGH: 0
SORE THROAT: 0

## 2020-01-28 ENCOUNTER — NURSE ONLY (OUTPATIENT)
Dept: FAMILY MEDICINE CLINIC | Age: 20
End: 2020-01-28

## 2020-01-28 NOTE — PROGRESS NOTES
Melisa Martin, 22 y.o. female is here today for placement of PPD test  Reason for PPD test: school  Pt taken PPD test before: yes     PPD placed on 1/28/2020 on left FA. Wheal present . Patient advised to return for reading within 48-72 hours.

## 2020-01-30 ENCOUNTER — NURSE ONLY (OUTPATIENT)
Dept: FAMILY MEDICINE CLINIC | Age: 20
End: 2020-01-30
Payer: OTHER GOVERNMENT

## 2020-01-30 PROBLEM — Z34.90 PREGNANCY: Status: ACTIVE | Noted: 2018-03-09

## 2020-01-30 PROBLEM — O26.899 ABDOMINAL PAIN AFFECTING PREGNANCY: Status: ACTIVE | Noted: 2018-01-05

## 2020-01-30 PROBLEM — Z34.90 TERM PREGNANCY: Status: ACTIVE | Noted: 2018-03-20

## 2020-01-30 PROBLEM — R10.9 ABDOMINAL PAIN AFFECTING PREGNANCY: Status: ACTIVE | Noted: 2018-01-05

## 2020-01-30 PROCEDURE — 90716 VAR VACCINE LIVE SUBQ: CPT | Performed by: NURSE PRACTITIONER

## 2020-01-30 PROCEDURE — 90471 IMMUNIZATION ADMIN: CPT | Performed by: NURSE PRACTITIONER

## 2020-01-30 RX ORDER — FLUOXETINE HYDROCHLORIDE 20 MG/1
20 CAPSULE ORAL
COMMUNITY
Start: 2019-02-28

## 2020-01-30 RX ORDER — ONDANSETRON 8 MG/1
8 TABLET, ORALLY DISINTEGRATING ORAL
COMMUNITY
Start: 2019-04-10 | End: 2020-03-10

## 2020-03-10 ENCOUNTER — TELEPHONE (OUTPATIENT)
Dept: FAMILY MEDICINE CLINIC | Age: 20
End: 2020-03-10

## 2020-03-10 ENCOUNTER — OFFICE VISIT (OUTPATIENT)
Dept: URGENT CARE | Age: 20
End: 2020-03-10
Payer: OTHER GOVERNMENT

## 2020-03-10 VITALS
TEMPERATURE: 97.6 F | HEART RATE: 61 BPM | SYSTOLIC BLOOD PRESSURE: 96 MMHG | WEIGHT: 152.4 LBS | RESPIRATION RATE: 16 BRPM | DIASTOLIC BLOOD PRESSURE: 60 MMHG | HEIGHT: 70 IN | BODY MASS INDEX: 21.82 KG/M2 | OXYGEN SATURATION: 100 %

## 2020-03-10 LAB
APPEARANCE FLUID: ABNORMAL
BILIRUBIN, POC: NEGATIVE
BLOOD URINE, POC: ABNORMAL
CLARITY, POC: CLEAR
COLOR, POC: YELLOW
GLUCOSE URINE, POC: NEGATIVE
KETONES, POC: NEGATIVE
LEUKOCYTE EST, POC: ABNORMAL
NITRITE, POC: NEGATIVE
PH, POC: 6.5
PROTEIN, POC: NEGATIVE
SPECIFIC GRAVITY, POC: 1.01
UROBILINOGEN, POC: 0.2

## 2020-03-10 PROCEDURE — 81002 URINALYSIS NONAUTO W/O SCOPE: CPT | Performed by: NURSE PRACTITIONER

## 2020-03-10 PROCEDURE — 99213 OFFICE O/P EST LOW 20 MIN: CPT | Performed by: NURSE PRACTITIONER

## 2020-03-10 RX ORDER — ONDANSETRON 4 MG/1
4 TABLET, ORALLY DISINTEGRATING ORAL 3 TIMES DAILY PRN
Qty: 15 TABLET | Refills: 0 | Status: SHIPPED | OUTPATIENT
Start: 2020-03-10 | End: 2020-03-15

## 2020-03-10 RX ORDER — NITROFURANTOIN 25; 75 MG/1; MG/1
100 CAPSULE ORAL 2 TIMES DAILY
Qty: 14 CAPSULE | Refills: 0 | Status: SHIPPED | OUTPATIENT
Start: 2020-03-10 | End: 2020-03-17

## 2020-03-10 ASSESSMENT — ENCOUNTER SYMPTOMS
DIARRHEA: 1
ABDOMINAL DISTENTION: 0
COUGH: 0
ABDOMINAL PAIN: 1
BELCHING: 0
NAUSEA: 0
SORE THROAT: 0
ALLERGIC/IMMUNOLOGIC NEGATIVE: 1
SINUS PAIN: 0
VOMITING: 1
FLATUS: 0
SINUS PRESSURE: 0
CONSTIPATION: 0
EYES NEGATIVE: 1

## 2020-03-10 NOTE — PATIENT INSTRUCTIONS
Plenty of fluids- force fluids, drink more water  Rest  OTC Tylenol or Motrin as needed  Zofran as directed for nausea and vomiting  Urine sent for culture we will call with results in 2-3 days  Macrobid as directed  If abd pain worsens, you have uncontrolled vomiting or diarrhea, fever you are advised to go to ER, pt voices understanding of this    Patient Education        Abdominal Pain: Care Instructions  Your Care Instructions    Abdominal pain has many possible causes. Some aren't serious and get better on their own in a few days. Others need more testing and treatment. If your pain continues or gets worse, you need to be rechecked and may need more tests to find out what is wrong. You may need surgery to correct the problem. Don't ignore new symptoms, such as fever, nausea and vomiting, urination problems, pain that gets worse, and dizziness. These may be signs of a more serious problem. Your doctor may have recommended a follow-up visit in the next 8 to 12 hours. If you are not getting better, you may need more tests or treatment. The doctor has checked you carefully, but problems can develop later. If you notice any problems or new symptoms, get medical treatment right away. Follow-up care is a key part of your treatment and safety. Be sure to make and go to all appointments, and call your doctor if you are having problems. It's also a good idea to know your test results and keep a list of the medicines you take. How can you care for yourself at home? · Rest until you feel better. · To prevent dehydration, drink plenty of fluids, enough so that your urine is light yellow or clear like water. Choose water and other caffeine-free clear liquids until you feel better. If you have kidney, heart, or liver disease and have to limit fluids, talk with your doctor before you increase the amount of fluids you drink.   · If your stomach is upset, eat mild foods, such as rice, dry toast or crackers, bananas, and applesauce. Try eating several small meals instead of two or three large ones. · Wait until 48 hours after all symptoms have gone away before you have spicy foods, alcohol, and drinks that contain caffeine. · Do not eat foods that are high in fat. · Avoid anti-inflammatory medicines such as aspirin, ibuprofen (Advil, Motrin), and naproxen (Aleve). These can cause stomach upset. Talk to your doctor if you take daily aspirin for another health problem. When should you call for help? Call 911 anytime you think you may need emergency care. For example, call if:    · You passed out (lost consciousness).     · You pass maroon or very bloody stools.     · You vomit blood or what looks like coffee grounds.     · You have new, severe belly pain.    Call your doctor now or seek immediate medical care if:    · Your pain gets worse, especially if it becomes focused in one area of your belly.     · You have a new or higher fever.     · Your stools are black and look like tar, or they have streaks of blood.     · You have unexpected vaginal bleeding.     · You have symptoms of a urinary tract infection. These may include:  ? Pain when you urinate. ? Urinating more often than usual.  ? Blood in your urine.     · You are dizzy or lightheaded, or you feel like you may faint.    Watch closely for changes in your health, and be sure to contact your doctor if:    · You are not getting better after 1 day (24 hours). Where can you learn more? Go to https://[email or web address]. org and sign in to your OnDe[employer] account. Enter M036 in the [facility] box to learn more about \"Abdominal Pain: Care Instructions. \"     If you do not have an account, please click on the \"Sign Up Now\" link. Current as of: June 26, 2019  Content Version: 12.3  © 5296-5583 Healthwise, Incorporated. Care instructions adapted under license by [facility], [email or web address] [facility] ([facility]).  If you have questions about a medical condition or this instruction, always ask bubble baths, feminine hygiene sprays, and other feminine hygiene products that have deodorants. · After going to the bathroom, wipe from front to back. When should you call for help? Call your doctor now or seek immediate medical care if:    · Symptoms such as fever, chills, nausea, or vomiting get worse or appear for the first time.     · You have new pain in your back just below your rib cage. This is called flank pain.     · There is new blood or pus in your urine.     · You have any problems with your antibiotic medicine.    Watch closely for changes in your health, and be sure to contact your doctor if:    · You are not getting better after taking an antibiotic for 2 days.     · Your symptoms go away but then come back. Where can you learn more? Go to https://KKBOXpeDyynoeb.ASSET4. org and sign in to your [employer] account. Enter H281 in the [employer] box to learn more about \"Urinary Tract Infection in Women: Care Instructions. \"     If you do not have an account, please click on the \"Sign Up Now\" link. Current as of: August 21, 2019  Content Version: 12.3  © 1943-6860 Healthwise, Incorporated. Care instructions adapted under license by [facility] ([facility]). If you have questions about a medical condition or this instruction, always ask your healthcare professional. [address] any warranty or liability for your use of this information.

## 2020-03-10 NOTE — TELEPHONE ENCOUNTER
Patient left vm c/o vomiting and requested a same day appointment. No availability. Returned call to patient and left a detailed voicemail notifying her that there is no current availability today and that she will need to go to Urgent Care for treatment.

## 2020-03-10 NOTE — PROGRESS NOTES
[facility] URGENT CARE  [address]  [address] [place] [address]  Dept: [phone]  Loc: [phone]    Tomasa Payne is a 23 y.o. female who presents today for her medical conditions/complaintsas noted below. Tomasa Payne is c/o of Emesis; Dizziness; and Abdominal Pain        HPI:     Emesis    This is a new problem. The current episode started today. The problem occurs 2 to 4 times per day (she has vomited 4 times today). The problem has been gradually improving. The emesis has an appearance of stomach contents. There has been no fever. Associated symptoms include abdominal pain, diarrhea and dizziness. Pertinent negatives include no arthralgias, chills, coughing, fever, headaches, myalgias, sweats or URI. Risk factors include ill contacts (Daughter's  has had stomach virus). She has tried nothing for the symptoms. The treatment provided no relief. Dizziness   This is a new problem. The current episode started today. The problem occurs intermittently. The problem has been unchanged. Associated symptoms include abdominal pain, anorexia and vomiting. Pertinent negatives include no arthralgias, chills, congestion, coughing, fatigue, fever, headaches, myalgias, nausea, rash or sore throat. Nothing aggravates the symptoms. She has tried nothing for the symptoms. The treatment provided no relief. Abdominal Pain   This is a new problem. The current episode started today. The onset quality is sudden. The problem occurs intermittently. The problem has been gradually improving. The pain is located in the RLQ. The pain is at a severity of 5/10. The pain is moderate. The quality of the pain is sharp and cramping. The abdominal pain does not radiate. Associated symptoms include anorexia, diarrhea and vomiting. Pertinent negatives include no arthralgias, belching, constipation, dysuria, fever, flatus, frequency, headaches, myalgias or nausea.  Nothing aggravates Chlamydia screen  12/06/2020 (Originally 2/18/2014)    DTaP/Tdap/Td vaccine (8 - Td) 03/23/2028    Shingles Vaccine (1 of 2) 02/18/2048    Hepatitis B vaccine  Completed    Flu vaccine  Completed    Hepatitis A vaccine  Aged Out    Hib vaccine  Aged Out    Meningococcal (ACWY) vaccine  Aged Out    Pneumococcal 0-64 years Vaccine  Aged Out    Varicella vaccine  Discontinued    HIV screen  Discontinued       Subjective:     Review of Systems   Constitutional: Positive for appetite change. Negative for activity change, chills, fatigue and fever. HENT: Negative for congestion, ear discharge, sinus pressure, sinus pain and sore throat. Eyes: Negative. Respiratory: Negative for cough. Cardiovascular: Negative. Gastrointestinal: Positive for abdominal pain, anorexia, diarrhea and vomiting. Negative for abdominal distention, constipation, flatus and nausea. Endocrine: Negative. Genitourinary: Negative for dysuria, flank pain, frequency, vaginal bleeding, vaginal discharge and vaginal pain. Musculoskeletal: Negative for arthralgias and myalgias. Skin: Negative for rash. Allergic/Immunologic: Negative. Neurological: Positive for dizziness. Negative for headaches.       :Objective      Physical Exam  Vitals signs and nursing note reviewed. Constitutional:       General: She is awake. She is not in acute distress. Appearance: Normal appearance. She is well-developed, well-groomed and normal weight. She is not ill-appearing. HENT:      Head: Normocephalic. Right Ear: Hearing normal.      Left Ear: Hearing normal.      Nose: Nose normal.      Mouth/Throat:      Lips: Pink. Eyes:      General: Lids are normal.   Neck:      Musculoskeletal: Neck supple. Cardiovascular:      Rate and Rhythm: Normal rate and regular rhythm. Heart sounds: Normal heart sounds, S1 normal and S2 normal. No murmur. No friction rub. No gallop.     Pulmonary:      Effort: Pulmonary effort is normal. No respiratory distress. Breath sounds: Normal breath sounds and air entry. No wheezing, rhonchi or rales. Abdominal:      General: Abdomen is flat. Bowel sounds are increased. Palpations: Abdomen is soft. Tenderness: There is abdominal tenderness in the right lower quadrant. There is no right CVA tenderness, left CVA tenderness, guarding or rebound. Comments: Hyperactive bowel sounds all 4 quads   Musculoskeletal:         General: No tenderness or deformity. Skin:     General: Skin is warm and dry. Capillary Refill: Capillary refill takes less than 2 seconds. Neurological:      General: No focal deficit present. Mental Status: She is alert, oriented to person, place, and time and easily aroused. Mental status is at baseline. Psychiatric:         Attention and Perception: Attention normal.         Mood and Affect: Mood normal.         Speech: Speech normal.         Behavior: Behavior normal. Behavior is cooperative. BP 96/60   Pulse 61   Temp 97.6 °F (36.4 °C) (Oral)   Resp 16   Ht 5' 10\" (1.778 m)   Wt 152 lb 6.4 oz (69.1 kg)   LMP 02/25/2020   SpO2 100%   BMI 21.87 kg/m²     :Assessment       Diagnosis Orders   1. Right lower quadrant abdominal pain  POCT Urinalysis no Micro    Culture, Urine   2. Vomiting without nausea, intractability of vomiting not specified, unspecified vomiting type  Culture, Urine   3. Urinary tract infection with hematuria, site unspecified         :Plan      Orders Placed This Encounter   Procedures    Culture, Urine     Order Specific Question:   Specify (ex-cath, midstream, cysto, etc)?      Answer:   Midstream    POCT Urinalysis no Micro     Results for orders placed or performed in visit on 03/10/20   POCT Urinalysis no Micro   Result Value Ref Range    Color, UA Yellow     Clarity, UA Clear     Glucose, UA POC Negative     Bilirubin, UA Negative     Ketones, UA Negative     Spec Grav, UA 1.010     Blood, UA POC Moderate     pH, UA 6.5     Protein, UA POC negative     Urobilinogen, UA 0.2     Leukocytes, UA Small     Nitrite, UA Negative     Appearance, Fluid         No follow-ups on file. Orders Placed This Encounter   Medications    nitrofurantoin, macrocrystal-monohydrate, (MACROBID) 100 MG capsule     Sig: Take 1 capsule by mouth 2 times daily for 7 days     Dispense:  14 capsule     Refill:  0    ondansetron (ZOFRAN-ODT) 4 MG disintegrating tablet     Sig: Take 1 tablet by mouth 3 times daily as needed for Nausea or Vomiting     Dispense:  15 tablet     Refill:  0        Patient Instructions     Plenty of fluids- force fluids, drink more water  Rest  OTC Tylenol or Motrin as needed  Zofran as directed for nausea and vomiting  Urine sent for culture we will call with results in 2-3 days  Macrobid as directed  If abd pain worsens, you have uncontrolled vomiting or diarrhea, fever you are advised to go to ER, pt voices understanding of this    Patient Education        Abdominal Pain: Care Instructions  Your Care Instructions    Abdominal pain has many possible causes. Some aren't serious and get better on their own in a few days. Others need more testing and treatment. If your pain continues or gets worse, you need to be rechecked and may need more tests to find out what is wrong. You may need surgery to correct the problem. Don't ignore new symptoms, such as fever, nausea and vomiting, urination problems, pain that gets worse, and dizziness. These may be signs of a more serious problem. Your doctor may have recommended a follow-up visit in the next 8 to 12 hours. If you are not getting better, you may need more tests or treatment. The doctor has checked you carefully, but problems can develop later. If you notice any problems or new symptoms, get medical treatment right away. Follow-up care is a key part of your treatment and safety. Be sure to make and go to all appointments, and call your doctor if you are having problems.  It's also a good idea to know your test results and keep a list of the medicines you take. How can you care for yourself at home? · Rest until you feel better. · To prevent dehydration, drink plenty of fluids, enough so that your urine is light yellow or clear like water. Choose water and other caffeine-free clear liquids until you feel better. If you have kidney, heart, or liver disease and have to limit fluids, talk with your doctor before you increase the amount of fluids you drink. · If your stomach is upset, eat mild foods, such as rice, dry toast or crackers, bananas, and applesauce. Try eating several small meals instead of two or three large ones. · Wait until 48 hours after all symptoms have gone away before you have spicy foods, alcohol, and drinks that contain caffeine. · Do not eat foods that are high in fat. · Avoid anti-inflammatory medicines such as aspirin, ibuprofen (Advil, Motrin), and naproxen (Aleve). These can cause stomach upset. Talk to your doctor if you take daily aspirin for another health problem. When should you call for help? Call 911 anytime you think you may need emergency care. For example, call if:    · You passed out (lost consciousness).     · You pass maroon or very bloody stools.     · You vomit blood or what looks like coffee grounds.     · You have new, severe belly pain.    Call your doctor now or seek immediate medical care if:    · Your pain gets worse, especially if it becomes focused in one area of your belly.     · You have a new or higher fever.     · Your stools are black and look like tar, or they have streaks of blood.     · You have unexpected vaginal bleeding.     · You have symptoms of a urinary tract infection. These may include:  ? Pain when you urinate. ? Urinating more often than usual.  ?  Blood in your urine.     · You are dizzy or lightheaded, or you feel like you may faint.    Watch closely for changes in your health, and be sure to contact your doctor if:    · You are not getting better after 1 day (24 hours). Where can you learn more? Go to https://chpepiceweb.healthDirect Hit. org and sign in to your [employer] account. Enter N500 in the [employer] box to learn more about \"Abdominal Pain: Care Instructions. \"     If you do not have an account, please click on the \"Sign Up Now\" link. Current as of: June 26, 2019  Content Version: 12.3  © 7333-4122 [employer]. Care instructions adapted under license by [facility] [facility] ([facility]). If you have questions about a medical condition or this instruction, always ask your healthcare professional. [place] [address] any warranty or liability for your use of this information. Patient Education        Urinary Tract Infection in Women: Care Instructions  Your Care Instructions    A urinary tract infection, or UTI, is a general term for an infection anywhere between the kidneys and the urethra (where urine comes out). Most UTIs are bladder infections. They often cause pain or burning when you urinate. UTIs are caused by bacteria and can be cured with antibiotics. Be sure to complete your treatment so that the infection goes away. Follow-up care is a key part of your treatment and safety. Be sure to make and go to all appointments, and call your doctor if you are having problems. It's also a good idea to know your test results and keep a list of the medicines you take. How can you care for yourself at home? · Take your antibiotics as directed. Do not stop taking them just because you feel better. You need to take the full course of antibiotics. · Drink extra water and other fluids for the next day or two. This may help wash out the bacteria that are causing the infection. (If you have kidney, heart, or liver disease and have to limit fluids, talk with your doctor before you increase your fluid intake.)  · Avoid drinks that are carbonated or have caffeine.  They can irritate the bladder. · Urinate often. Try to empty your bladder each time. · To relieve pain, take a hot bath or lay a heating pad set on low over your lower belly or genital area. Never go to sleep with a heating pad in place. To prevent UTIs  · Drink plenty of water each day. This helps you urinate often, which clears bacteria from your system. (If you have kidney, heart, or liver disease and have to limit fluids, talk with your doctor before you increase your fluid intake.)  · Urinate when you need to. · Urinate right after you have sex. · Change sanitary pads often. · Avoid douches, bubble baths, feminine hygiene sprays, and other feminine hygiene products that have deodorants. · After going to the bathroom, wipe from front to back. When should you call for help? Call your doctor now or seek immediate medical care if:    · Symptoms such as fever, chills, nausea, or vomiting get worse or appear for the first time.     · You have new pain in your back just below your rib cage. This is called flank pain.     · There is new blood or pus in your urine.     · You have any problems with your antibiotic medicine.    Watch closely for changes in your health, and be sure to contact your doctor if:    · You are not getting better after taking an antibiotic for 2 days.     · Your symptoms go away but then come back. Where can you learn more? Go to https://Vizi Labsperdeb.healthdigiSchool. org and sign in to your [employer] account. Enter M244 in the [facility] box to learn more about \"Urinary Tract Infection in Women: Care Instructions. \"     If you do not have an account, please click on the \"Sign Up Now\" link. Current as of: August 21, 2019  Content Version: 12.3  © 6220-0633 Healthwise, [employer]. Care instructions adapted under license by [facility] ([facility]).  If you have questions about a medical condition or this instruction, always ask your healthcare professional. [employer] [place] disclaims any warranty or liability for your use of this information. Patient given educational materials- see patient instructions. Discussed use, benefit, and side effects of prescribedmedications. All patient questions answered. Pt voiced understanding.        Electronically signed by ELMER Rios CNP on 3/10/2020 at 1:06 PM

## 2020-03-10 NOTE — LETTER
[facility] Urgent Care  [address] [place] [id number]  Phone: [address] [facility], APRN - CNP        March 10, 2020     Patient: Mamie Mcmillan   YOB: 2000   Date of Visit: 3/10/2020       To Whom it May Concern:    Christopher Bejarano was seen in my clinic on 3/10/2020. She may return to work on 03/11/2020. If you have any questions or concerns, please don't hesitate to call.     Sincerely,         ELMER Ching Ace - CNP

## 2020-03-12 LAB — URINE CULTURE, ROUTINE: NORMAL

## 2021-02-07 PROCEDURE — U0004 COV-19 TEST NON-CDC HGH THRU: HCPCS | Performed by: NURSE PRACTITIONER

## 2021-04-05 ENCOUNTER — INITIAL PRENATAL (OUTPATIENT)
Dept: OBSTETRICS AND GYNECOLOGY | Facility: CLINIC | Age: 21
End: 2021-04-05

## 2021-04-05 VITALS — WEIGHT: 177 LBS | SYSTOLIC BLOOD PRESSURE: 100 MMHG | BODY MASS INDEX: 25.4 KG/M2 | DIASTOLIC BLOOD PRESSURE: 60 MMHG

## 2021-04-05 DIAGNOSIS — Z3A.37 37 WEEKS GESTATION OF PREGNANCY: Primary | ICD-10-CM

## 2021-04-05 PROCEDURE — 99203 OFFICE O/P NEW LOW 30 MIN: CPT | Performed by: OBSTETRICS & GYNECOLOGY

## 2021-04-05 RX ORDER — PRENATAL VIT/IRON FUM/FOLIC AC 27MG-0.8MG
1 TABLET ORAL DAILY
COMMUNITY
End: 2021-09-12

## 2021-04-05 NOTE — PROGRESS NOTES
at 37.1 IUP presents for initial prenatal care visit. Patient reports no complaints at this time. She reports that her last menstrual cycle was in .   ObHx: NSVDX1   PE   SVE: 2 cm    A/P  at 37.1 IUP   RTC in 1 week   New ob labs   New ob information   US today revealed cephalic, anterior placenta   GBS cultures today

## 2021-04-06 LAB — GLUCOSE 1H P 50 G GLC PO SERPL-MCNC: 97 MG/DL (ref 65–139)

## 2021-04-07 LAB — GP B STREP DNA SPEC QL NAA+PROBE: NEGATIVE

## 2021-04-09 LAB
ABO GROUP BLD: NORMAL
BACTERIA UR CULT: NORMAL
BACTERIA UR CULT: NORMAL
BASOPHILS # BLD AUTO: 0.02 10*3/MM3 (ref 0–0.2)
BASOPHILS NFR BLD AUTO: 0.4 % (ref 0–1.5)
BLD GP AB SCN SERPL QL: NEGATIVE
C TRACH RRNA SPEC QL NAA+PROBE: POSITIVE
C TRACH RRNA SPEC QL NAA+PROBE: POSITIVE
DRUGS UR: NORMAL
EOSINOPHIL # BLD AUTO: 0.14 10*3/MM3 (ref 0–0.4)
EOSINOPHIL NFR BLD AUTO: 2.5 % (ref 0.3–6.2)
ERYTHROCYTE [DISTWIDTH] IN BLOOD BY AUTOMATED COUNT: 12.5 % (ref 12.3–15.4)
HBV SURFACE AG SERPL QL IA: NEGATIVE
HCT VFR BLD AUTO: 33.8 % (ref 34–46.6)
HGB BLD-MCNC: 10.3 G/DL (ref 12–15.9)
HIV 1+2 AB+HIV1 P24 AG SERPL QL IA: NON REACTIVE
IMM GRANULOCYTES # BLD AUTO: 0.01 10*3/MM3 (ref 0–0.05)
IMM GRANULOCYTES NFR BLD AUTO: 0.2 % (ref 0–0.5)
LYMPHOCYTES # BLD AUTO: 1 10*3/MM3 (ref 0.7–3.1)
LYMPHOCYTES NFR BLD AUTO: 18 % (ref 19.6–45.3)
MCH RBC QN AUTO: 26.5 PG (ref 26.6–33)
MCHC RBC AUTO-ENTMCNC: 30.5 G/DL (ref 31.5–35.7)
MCV RBC AUTO: 87.1 FL (ref 79–97)
MONOCYTES # BLD AUTO: 0.45 10*3/MM3 (ref 0.1–0.9)
MONOCYTES NFR BLD AUTO: 8.1 % (ref 5–12)
N GONORRHOEA RRNA SPEC QL NAA+PROBE: NEGATIVE
NEUTROPHILS # BLD AUTO: 3.95 10*3/MM3 (ref 1.7–7)
NEUTROPHILS NFR BLD AUTO: 70.8 % (ref 42.7–76)
NRBC BLD AUTO-RTO: 0 /100 WBC (ref 0–0.2)
PLATELET # BLD AUTO: 216 10*3/MM3 (ref 140–450)
RBC # BLD AUTO: 3.88 10*6/MM3 (ref 3.77–5.28)
RH BLD: POSITIVE
RPR SER QL: NON REACTIVE
RUBV IGG SERPL IA-ACNC: 4.48 INDEX
WBC # BLD AUTO: 5.57 10*3/MM3 (ref 3.4–10.8)

## 2021-04-13 ENCOUNTER — ROUTINE PRENATAL (OUTPATIENT)
Dept: OBSTETRICS AND GYNECOLOGY | Facility: CLINIC | Age: 21
End: 2021-04-13

## 2021-04-13 VITALS — SYSTOLIC BLOOD PRESSURE: 134 MMHG | WEIGHT: 180 LBS | DIASTOLIC BLOOD PRESSURE: 84 MMHG | BODY MASS INDEX: 25.83 KG/M2

## 2021-04-13 DIAGNOSIS — Z3A.38 38 WEEKS GESTATION OF PREGNANCY: Primary | ICD-10-CM

## 2021-04-13 DIAGNOSIS — O98.819 CHLAMYDIA INFECTION DURING PREGNANCY: ICD-10-CM

## 2021-04-13 DIAGNOSIS — A74.9 CHLAMYDIA INFECTION DURING PREGNANCY: ICD-10-CM

## 2021-04-13 LAB
GLUCOSE UR STRIP-MCNC: NEGATIVE MG/DL
PROT UR STRIP-MCNC: ABNORMAL MG/DL

## 2021-04-13 PROCEDURE — 99213 OFFICE O/P EST LOW 20 MIN: CPT | Performed by: OBSTETRICS & GYNECOLOGY

## 2021-04-13 RX ORDER — AZITHROMYCIN 500 MG/1
1000 TABLET, FILM COATED ORAL DAILY
Qty: 2 TABLET | Refills: 0 | Status: SHIPPED | OUTPATIENT
Start: 2021-04-13 | End: 2021-04-14

## 2021-04-13 NOTE — PROGRESS NOTES
at 38.2 IUP presents for follow up prenatal care visit. No obstetrical complaints other than some swelling.   PE see above   A/P  at 38.2 IUP   RTC in 1 week   Sent Azithromycin for chlamydia   Labor precautions

## 2021-04-20 ENCOUNTER — ROUTINE PRENATAL (OUTPATIENT)
Dept: OBSTETRICS AND GYNECOLOGY | Facility: CLINIC | Age: 21
End: 2021-04-20

## 2021-04-20 ENCOUNTER — PREP FOR SURGERY (OUTPATIENT)
Dept: OTHER | Facility: HOSPITAL | Age: 21
End: 2021-04-20

## 2021-04-20 VITALS — BODY MASS INDEX: 25.83 KG/M2 | DIASTOLIC BLOOD PRESSURE: 74 MMHG | WEIGHT: 180 LBS | SYSTOLIC BLOOD PRESSURE: 128 MMHG

## 2021-04-20 DIAGNOSIS — Z3A.39 39 WEEKS GESTATION OF PREGNANCY: Primary | ICD-10-CM

## 2021-04-20 DIAGNOSIS — Z34.90 ENCOUNTER FOR ELECTIVE INDUCTION OF LABOR: Primary | ICD-10-CM

## 2021-04-20 LAB
GLUCOSE UR STRIP-MCNC: NEGATIVE MG/DL
PROT UR STRIP-MCNC: ABNORMAL MG/DL

## 2021-04-20 PROCEDURE — 99212 OFFICE O/P EST SF 10 MIN: CPT | Performed by: OBSTETRICS & GYNECOLOGY

## 2021-04-20 RX ORDER — SODIUM CHLORIDE 0.9 % (FLUSH) 0.9 %
3-10 SYRINGE (ML) INJECTION AS NEEDED
Status: CANCELLED | OUTPATIENT
Start: 2021-04-20

## 2021-04-20 RX ORDER — OXYCODONE HYDROCHLORIDE AND ACETAMINOPHEN 5; 325 MG/1; MG/1
1 TABLET ORAL EVERY 4 HOURS PRN
Status: CANCELLED | OUTPATIENT
Start: 2021-04-20 | End: 2021-04-30

## 2021-04-20 RX ORDER — OXYTOCIN/0.9 % SODIUM CHLORIDE 30/500 ML
999 PLASTIC BAG, INJECTION (ML) INTRAVENOUS ONCE
Status: CANCELLED | OUTPATIENT
Start: 2021-04-20 | End: 2021-04-20

## 2021-04-20 RX ORDER — ONDANSETRON 4 MG/1
4 TABLET, FILM COATED ORAL EVERY 6 HOURS PRN
Status: CANCELLED | OUTPATIENT
Start: 2021-04-20

## 2021-04-20 RX ORDER — PROMETHAZINE HYDROCHLORIDE 12.5 MG/1
12.5 SUPPOSITORY RECTAL EVERY 6 HOURS PRN
Status: CANCELLED | OUTPATIENT
Start: 2021-04-20

## 2021-04-20 RX ORDER — MISOPROSTOL 200 UG/1
800 TABLET ORAL AS NEEDED
Status: CANCELLED | OUTPATIENT
Start: 2021-04-20

## 2021-04-20 RX ORDER — OXYTOCIN/0.9 % SODIUM CHLORIDE 30/500 ML
250 PLASTIC BAG, INJECTION (ML) INTRAVENOUS CONTINUOUS
Status: CANCELLED | OUTPATIENT
Start: 2021-04-20 | End: 2021-04-20

## 2021-04-20 RX ORDER — SODIUM CHLORIDE, SODIUM LACTATE, POTASSIUM CHLORIDE, CALCIUM CHLORIDE 600; 310; 30; 20 MG/100ML; MG/100ML; MG/100ML; MG/100ML
125 INJECTION, SOLUTION INTRAVENOUS CONTINUOUS
Status: CANCELLED | OUTPATIENT
Start: 2021-04-20

## 2021-04-20 RX ORDER — CARBOPROST TROMETHAMINE 250 UG/ML
250 INJECTION, SOLUTION INTRAMUSCULAR AS NEEDED
Status: CANCELLED | OUTPATIENT
Start: 2021-04-20

## 2021-04-20 RX ORDER — ONDANSETRON 2 MG/ML
4 INJECTION INTRAMUSCULAR; INTRAVENOUS EVERY 6 HOURS PRN
Status: CANCELLED | OUTPATIENT
Start: 2021-04-20

## 2021-04-20 RX ORDER — PROMETHAZINE HYDROCHLORIDE 25 MG/1
12.5 TABLET ORAL EVERY 6 HOURS PRN
Status: CANCELLED | OUTPATIENT
Start: 2021-04-20

## 2021-04-20 RX ORDER — IBUPROFEN 600 MG/1
600 TABLET ORAL EVERY 6 HOURS PRN
Status: CANCELLED | OUTPATIENT
Start: 2021-04-20

## 2021-04-20 RX ORDER — BUTORPHANOL TARTRATE 1 MG/ML
1 INJECTION, SOLUTION INTRAMUSCULAR; INTRAVENOUS
Status: CANCELLED | OUTPATIENT
Start: 2021-04-20

## 2021-04-20 RX ORDER — SODIUM CHLORIDE 0.9 % (FLUSH) 0.9 %
3 SYRINGE (ML) INJECTION EVERY 12 HOURS SCHEDULED
Status: CANCELLED | OUTPATIENT
Start: 2021-04-20

## 2021-04-20 RX ORDER — FAMOTIDINE 20 MG/1
20 TABLET, FILM COATED ORAL ONCE AS NEEDED
Status: CANCELLED | OUTPATIENT
Start: 2021-04-20

## 2021-04-20 RX ORDER — OXYTOCIN/0.9 % SODIUM CHLORIDE 30/500 ML
125 PLASTIC BAG, INJECTION (ML) INTRAVENOUS CONTINUOUS PRN
Status: CANCELLED | OUTPATIENT
Start: 2021-04-20

## 2021-04-20 RX ORDER — LIDOCAINE HYDROCHLORIDE 10 MG/ML
5 INJECTION, SOLUTION EPIDURAL; INFILTRATION; INTRACAUDAL; PERINEURAL AS NEEDED
Status: CANCELLED | OUTPATIENT
Start: 2021-04-20

## 2021-04-20 RX ORDER — ACETAMINOPHEN 325 MG/1
650 TABLET ORAL EVERY 4 HOURS PRN
Status: CANCELLED | OUTPATIENT
Start: 2021-04-20

## 2021-04-20 RX ORDER — TRISODIUM CITRATE DIHYDRATE AND CITRIC ACID MONOHYDRATE 500; 334 MG/5ML; MG/5ML
30 SOLUTION ORAL ONCE AS NEEDED
Status: CANCELLED | OUTPATIENT
Start: 2021-04-20

## 2021-04-20 RX ORDER — TERBUTALINE SULFATE 1 MG/ML
0.25 INJECTION, SOLUTION SUBCUTANEOUS AS NEEDED
Status: CANCELLED | OUTPATIENT
Start: 2021-04-20

## 2021-04-20 RX ORDER — ACETAMINOPHEN 650 MG/1
650 SUPPOSITORY RECTAL EVERY 4 HOURS PRN
Status: CANCELLED | OUTPATIENT
Start: 2021-04-20

## 2021-04-20 RX ORDER — OXYCODONE AND ACETAMINOPHEN 7.5; 325 MG/1; MG/1
2 TABLET ORAL EVERY 4 HOURS PRN
Status: CANCELLED | OUTPATIENT
Start: 2021-04-20 | End: 2021-04-30

## 2021-04-20 RX ORDER — MORPHINE SULFATE 2 MG/ML
2 INJECTION, SOLUTION INTRAMUSCULAR; INTRAVENOUS
Status: CANCELLED | OUTPATIENT
Start: 2021-04-20 | End: 2021-04-30

## 2021-04-20 RX ORDER — METHYLERGONOVINE MALEATE 0.2 MG/ML
200 INJECTION INTRAVENOUS ONCE AS NEEDED
Status: CANCELLED | OUTPATIENT
Start: 2021-04-20

## 2021-04-20 RX ORDER — FAMOTIDINE 10 MG/ML
20 INJECTION, SOLUTION INTRAVENOUS ONCE AS NEEDED
Status: CANCELLED | OUTPATIENT
Start: 2021-04-20

## 2021-04-20 NOTE — H&P (VIEW-ONLY)
[facility]  Lamar Montiel  : 2000  MRN: [id number]  CSN: [id number]    History and Physical    Subjective   Lamar Montiel is a 21 y.o. year old  with an Estimated Date of Delivery: 21 scheduled for induction of labor on 2021 due to elective induction of labor, patient request.   Prenatal care has been with Dr. Rufina Chinchilla.  It has been complicated by chlamydia and scant prenatal care.    OB History    Para Term  AB Living   2 1 1 0 0 1   SAB TAB Ectopic Molar Multiple Live Births   0 0 0 0 0 1      # Outcome Date GA Lbr Christophe/2nd Weight Sex Delivery Anes PTL Lv   2 Current            1 Term 18 39w2d  3270 g (7 lb 3.3 oz) F Vag-Spont EPI N ERICA      Name: NAREN MONTIEL      Apgar1: 9  Apgar5: 9     Past Medical History:   Diagnosis Date   • Chlamydia     prior to pregnancy     Past Surgical History:   Procedure Laterality Date   • MOUTH SURGERY         Current Outpatient Medications:   •  Prenatal Vit-Fe Fumarate-FA (prenatal vitamin 27-0.8) 27-0.8 MG tablet tablet, Take 1 tablet by mouth Daily., Disp: , Rfl:     No Known Allergies  Social History    Tobacco Use      Smoking status: Never Smoker      Smokeless tobacco: Never Used    Review of Systems   Gastrointestinal: Negative for constipation and diarrhea.   Genitourinary: Negative for pelvic pain, vaginal bleeding and vaginal discharge.         Objective   There were no vitals taken for this visit.  General: well developed; well nourished  no acute distress   Heart: Not performed.   Lungs: breathing is unlabored   Abdomen:  Cervix: soft, non-tender; no masses  no umbilical or inguinal hernias are present  no hepato-splenomegaly  was checked (by me): 3-4 cm / 60 % / -3  EFW 7.5 pounds  Pelvis seems clinically adequate         Prenatal Labs  Lab Results   Component Value Date    HGB 10.3 (L) 2021    HEPBSAG Negative 2021    ABO O 2021    RH Positive 2021    ABSCRN Negative 2021     [id number] Non Reactive 2021    URINECX Final report 2021       Recent Labs  Lab Results   Component Value Date    HGB 10.3 (L) 2021    HCT 33.8 (L) 2021    WBC 5.57 2021     2021           Assessment   1. IUP with an Estimated Date of Delivery: 21  2. Induction of labor scheduled on 2021 for elective - patient's request.  3. GBS negative   4. Positive chlamydia treated         Plan   1. Risks and benefits of induction discussed.  Patient understands that IOL increases the risk of  delivery over spontaneous labor, especially if the patient does not have a favorable cervix.    Sherice Chinchilla, DO  2021

## 2021-04-20 NOTE — H&P
[facility]  Lamar Montiel  : 2000  MRN: [id number]  CSN: [id number]    History and Physical    Subjective   Lamar Montiel is a 21 y.o. year old  with an Estimated Date of Delivery: 21 scheduled for induction of labor on 2021 due to elective induction of labor, patient request.   Prenatal care has been with Dr. Rufina Chinchilla.  It has been complicated by chlamydia and scant prenatal care.    OB History    Para Term  AB Living   2 1 1 0 0 1   SAB TAB Ectopic Molar Multiple Live Births   0 0 0 0 0 1      # Outcome Date GA Lbr Christophe/2nd Weight Sex Delivery Anes PTL Lv   2 Current            1 Term 18 39w2d  3270 g (7 lb 3.3 oz) F Vag-Spont EPI N ERICA      Name: NAREN MONTIEL      Apgar1: 9  Apgar5: 9     Past Medical History:   Diagnosis Date   • Chlamydia     prior to pregnancy     Past Surgical History:   Procedure Laterality Date   • MOUTH SURGERY         Current Outpatient Medications:   •  Prenatal Vit-Fe Fumarate-FA (prenatal vitamin 27-0.8) 27-0.8 MG tablet tablet, Take 1 tablet by mouth Daily., Disp: , Rfl:     No Known Allergies  Social History    Tobacco Use      Smoking status: Never Smoker      Smokeless tobacco: Never Used    Review of Systems   Gastrointestinal: Negative for constipation and diarrhea.   Genitourinary: Negative for pelvic pain, vaginal bleeding and vaginal discharge.         Objective   There were no vitals taken for this visit.  General: well developed; well nourished  no acute distress   Heart: Not performed.   Lungs: breathing is unlabored   Abdomen:  Cervix: soft, non-tender; no masses  no umbilical or inguinal hernias are present  no hepato-splenomegaly  was checked (by me): 3-4 cm / 60 % / -3  EFW 7.5 pounds  Pelvis seems clinically adequate         Prenatal Labs  Lab Results   Component Value Date    HGB 10.3 (L) 2021    HEPBSAG Negative 2021    ABO O 2021    RH Positive 2021    ABSCRN Negative 2021     [id number] Non Reactive 2021    URINECX Final report 2021       Recent Labs  Lab Results   Component Value Date    HGB 10.3 (L) 2021    HCT 33.8 (L) 2021    WBC 5.57 2021     2021           Assessment   1. IUP with an Estimated Date of Delivery: 21  2. Induction of labor scheduled on 2021 for elective - patient's request.  3. GBS negative   4. Positive chlamydia treated         Plan   1. Risks and benefits of induction discussed.  Patient understands that IOL increases the risk of  delivery over spontaneous labor, especially if the patient does not have a favorable cervix.    Sherice Chinchilla, DO  2021

## 2021-04-20 NOTE — PROGRESS NOTES
at 39.2 IUP presents for follow up prenatal care visit. No obstetrical complaints.   PE see above   A/P  at 39.2 IUP   Desires EIOL   Will retest g/c   Discussed risk, benefits to EIOL

## 2021-04-22 ENCOUNTER — HOSPITAL ENCOUNTER (INPATIENT)
Facility: HOSPITAL | Age: 21
LOS: 3 days | Discharge: HOME | End: 2021-04-25
Attending: OBSTETRICS & GYNECOLOGY | Admitting: OBSTETRICS & GYNECOLOGY

## 2021-04-22 ENCOUNTER — HOSPITAL ENCOUNTER (OUTPATIENT)
Dept: LABOR AND DELIVERY | Facility: HOSPITAL | Age: 21
Discharge: HOME | End: 2021-04-22

## 2021-04-22 DIAGNOSIS — Z34.90 ENCOUNTER FOR ELECTIVE INDUCTION OF LABOR: ICD-10-CM

## 2021-04-22 LAB
ABO GROUP BLD: NORMAL
BLD GP AB SCN SERPL QL: NEGATIVE
DEPRECATED RDW RBC AUTO: 38.1 FL (ref 37–54)
ERYTHROCYTE [DISTWIDTH] IN BLOOD BY AUTOMATED COUNT: 13.3 % (ref 12.3–15.4)
GLUCOSE BLDC GLUCOMTR-MCNC: 90 MG/DL (ref 70–130)
HCT VFR BLD AUTO: 27.2 % (ref 34–46.6)
HGB BLD-MCNC: 8.7 G/DL (ref 12–15.9)
MCH RBC QN AUTO: 25.5 PG (ref 26.6–33)
MCHC RBC AUTO-ENTMCNC: 32 G/DL (ref 31.5–35.7)
MCV RBC AUTO: 79.8 FL (ref 79–97)
PLATELET # BLD AUTO: 235 10*3/MM3 (ref 140–450)
PMV BLD AUTO: 11.6 FL (ref 6–12)
RBC # BLD AUTO: 3.41 10*6/MM3 (ref 3.77–5.28)
RH BLD: POSITIVE
T&S EXPIRATION DATE: NORMAL
WBC # BLD AUTO: 7.44 10*3/MM3 (ref 3.4–10.8)

## 2021-04-22 PROCEDURE — 86901 BLOOD TYPING SEROLOGIC RH(D): CPT | Performed by: OBSTETRICS & GYNECOLOGY

## 2021-04-22 PROCEDURE — 82962 GLUCOSE BLOOD TEST: CPT

## 2021-04-22 PROCEDURE — 85027 COMPLETE CBC AUTOMATED: CPT | Performed by: OBSTETRICS & GYNECOLOGY

## 2021-04-22 PROCEDURE — 86900 BLOOD TYPING SEROLOGIC ABO: CPT | Performed by: OBSTETRICS & GYNECOLOGY

## 2021-04-22 PROCEDURE — 86850 RBC ANTIBODY SCREEN: CPT | Performed by: OBSTETRICS & GYNECOLOGY

## 2021-04-22 RX ORDER — LIDOCAINE HYDROCHLORIDE 10 MG/ML
5 INJECTION, SOLUTION EPIDURAL; INFILTRATION; INTRACAUDAL; PERINEURAL AS NEEDED
Status: DISCONTINUED | OUTPATIENT
Start: 2021-04-22 | End: 2021-04-22 | Stop reason: HOSPADM

## 2021-04-22 RX ORDER — TRISODIUM CITRATE DIHYDRATE AND CITRIC ACID MONOHYDRATE 500; 334 MG/5ML; MG/5ML
30 SOLUTION ORAL ONCE AS NEEDED
Status: DISCONTINUED | OUTPATIENT
Start: 2021-04-22 | End: 2021-04-22 | Stop reason: HOSPADM

## 2021-04-22 RX ORDER — OXYTOCIN/0.9 % SODIUM CHLORIDE 30/500 ML
2-20 PLASTIC BAG, INJECTION (ML) INTRAVENOUS
Status: DISCONTINUED | OUTPATIENT
Start: 2021-04-22 | End: 2021-04-23

## 2021-04-22 RX ORDER — PROMETHAZINE HYDROCHLORIDE 12.5 MG/1
12.5 SUPPOSITORY RECTAL EVERY 6 HOURS PRN
Status: DISCONTINUED | OUTPATIENT
Start: 2021-04-22 | End: 2021-04-22 | Stop reason: HOSPADM

## 2021-04-22 RX ORDER — BUTORPHANOL TARTRATE 1 MG/ML
2 INJECTION, SOLUTION INTRAMUSCULAR; INTRAVENOUS
Status: DISCONTINUED | OUTPATIENT
Start: 2021-04-22 | End: 2021-04-22 | Stop reason: HOSPADM

## 2021-04-22 RX ORDER — SODIUM CHLORIDE, SODIUM LACTATE, POTASSIUM CHLORIDE, CALCIUM CHLORIDE 600; 310; 30; 20 MG/100ML; MG/100ML; MG/100ML; MG/100ML
125 INJECTION, SOLUTION INTRAVENOUS CONTINUOUS
Status: DISCONTINUED | OUTPATIENT
Start: 2021-04-22 | End: 2021-04-23

## 2021-04-22 RX ORDER — SODIUM CHLORIDE 0.9 % (FLUSH) 0.9 %
3 SYRINGE (ML) INJECTION EVERY 12 HOURS SCHEDULED
Status: DISCONTINUED | OUTPATIENT
Start: 2021-04-22 | End: 2021-04-22 | Stop reason: HOSPADM

## 2021-04-22 RX ORDER — PROMETHAZINE HYDROCHLORIDE 25 MG/1
12.5 TABLET ORAL EVERY 6 HOURS PRN
Status: DISCONTINUED | OUTPATIENT
Start: 2021-04-22 | End: 2021-04-22 | Stop reason: HOSPADM

## 2021-04-22 RX ORDER — SODIUM CHLORIDE 0.9 % (FLUSH) 0.9 %
3-10 SYRINGE (ML) INJECTION AS NEEDED
Status: DISCONTINUED | OUTPATIENT
Start: 2021-04-22 | End: 2021-04-22 | Stop reason: HOSPADM

## 2021-04-22 RX ORDER — BUTORPHANOL TARTRATE 1 MG/ML
1 INJECTION, SOLUTION INTRAMUSCULAR; INTRAVENOUS
Status: DISCONTINUED | OUTPATIENT
Start: 2021-04-22 | End: 2021-04-22 | Stop reason: HOSPADM

## 2021-04-22 RX ORDER — TERBUTALINE SULFATE 1 MG/ML
0.25 INJECTION, SOLUTION SUBCUTANEOUS AS NEEDED
Status: DISCONTINUED | OUTPATIENT
Start: 2021-04-22 | End: 2021-04-22 | Stop reason: HOSPADM

## 2021-04-22 RX ORDER — ONDANSETRON 2 MG/ML
4 INJECTION INTRAMUSCULAR; INTRAVENOUS EVERY 6 HOURS PRN
Status: DISCONTINUED | OUTPATIENT
Start: 2021-04-22 | End: 2021-04-22 | Stop reason: HOSPADM

## 2021-04-22 RX ORDER — ONDANSETRON 4 MG/1
4 TABLET, FILM COATED ORAL EVERY 6 HOURS PRN
Status: DISCONTINUED | OUTPATIENT
Start: 2021-04-22 | End: 2021-04-22 | Stop reason: HOSPADM

## 2021-04-22 RX ADMIN — SODIUM CHLORIDE, POTASSIUM CHLORIDE, SODIUM LACTATE AND CALCIUM CHLORIDE 125 ML/HR: 600; 310; 30; 20 INJECTION, SOLUTION INTRAVENOUS at 06:40

## 2021-04-22 RX ADMIN — OXYTOCIN-SODIUM CHLORIDE 0.9% IV SOLN 30 UNIT/500ML 2 MILLI-UNITS/MIN: 30-0.9/5 SOLUTION at 23:00

## 2021-04-22 RX ADMIN — SODIUM CHLORIDE, POTASSIUM CHLORIDE, SODIUM LACTATE AND CALCIUM CHLORIDE 125 ML/HR: 600; 310; 30; 20 INJECTION, SOLUTION INTRAVENOUS at 23:00

## 2021-04-22 RX ADMIN — SODIUM CHLORIDE, POTASSIUM CHLORIDE, SODIUM LACTATE AND CALCIUM CHLORIDE 125 ML/HR: 600; 310; 30; 20 INJECTION, SOLUTION INTRAVENOUS at 09:22

## 2021-04-22 NOTE — PLAN OF CARE
Goal Outcome Evaluation:  Plan of Care Reviewed With: patient  Progress: no change  Outcome Summary: Pt has had irritability et intermittent, irregular contractions; reactive strip; induction planned to reinitiate at 2300; SVE 3-4/60%/-2 station; IV lock intact

## 2021-04-23 ENCOUNTER — ANESTHESIA (OUTPATIENT)
Dept: LABOR AND DELIVERY | Facility: HOSPITAL | Age: 21
End: 2021-04-23

## 2021-04-23 ENCOUNTER — ANESTHESIA EVENT (OUTPATIENT)
Dept: LABOR AND DELIVERY | Facility: HOSPITAL | Age: 21
End: 2021-04-23

## 2021-04-23 LAB
C TRACH RRNA CVX QL NAA+PROBE: NOT DETECTED
N GONORRHOEA RRNA SPEC QL NAA+PROBE: NOT DETECTED

## 2021-04-23 PROCEDURE — 25010000002 ROPIVACAINE PER 1 MG: Performed by: NURSE ANESTHETIST, CERTIFIED REGISTERED

## 2021-04-23 PROCEDURE — 87591 N.GONORRHOEAE DNA AMP PROB: CPT | Performed by: OBSTETRICS & GYNECOLOGY

## 2021-04-23 PROCEDURE — 59410 OBSTETRICAL CARE: CPT | Performed by: OBSTETRICS & GYNECOLOGY

## 2021-04-23 PROCEDURE — 6A550ZT PHERESIS OF CORD BLOOD STEM CELLS, SINGLE: ICD-10-PCS | Performed by: OBSTETRICS & GYNECOLOGY

## 2021-04-23 PROCEDURE — 25010000002 ONDANSETRON PER 1 MG: Performed by: OBSTETRICS & GYNECOLOGY

## 2021-04-23 PROCEDURE — C1755 CATHETER, INTRASPINAL: HCPCS | Performed by: NURSE ANESTHETIST, CERTIFIED REGISTERED

## 2021-04-23 PROCEDURE — 51702 INSERT TEMP BLADDER CATH: CPT

## 2021-04-23 PROCEDURE — 87491 CHLMYD TRACH DNA AMP PROBE: CPT | Performed by: OBSTETRICS & GYNECOLOGY

## 2021-04-23 PROCEDURE — 0HQ9XZZ REPAIR PERINEUM SKIN, EXTERNAL APPROACH: ICD-10-PCS | Performed by: OBSTETRICS & GYNECOLOGY

## 2021-04-23 PROCEDURE — 88307 TISSUE EXAM BY PATHOLOGIST: CPT | Performed by: OBSTETRICS & GYNECOLOGY

## 2021-04-23 RX ORDER — ONDANSETRON 2 MG/ML
4 INJECTION INTRAMUSCULAR; INTRAVENOUS EVERY 6 HOURS PRN
Status: DISCONTINUED | OUTPATIENT
Start: 2021-04-23 | End: 2021-04-23

## 2021-04-23 RX ORDER — OXYTOCIN/0.9 % SODIUM CHLORIDE 30/500 ML
250 PLASTIC BAG, INJECTION (ML) INTRAVENOUS CONTINUOUS
Status: DISPENSED | OUTPATIENT
Start: 2021-04-23 | End: 2021-04-23

## 2021-04-23 RX ORDER — OXYCODONE HYDROCHLORIDE AND ACETAMINOPHEN 5; 325 MG/1; MG/1
1 TABLET ORAL EVERY 4 HOURS PRN
Status: DISCONTINUED | OUTPATIENT
Start: 2021-04-23 | End: 2021-04-25 | Stop reason: HOSPADM

## 2021-04-23 RX ORDER — PROMETHAZINE HYDROCHLORIDE 12.5 MG/1
12.5 SUPPOSITORY RECTAL EVERY 6 HOURS PRN
Status: DISCONTINUED | OUTPATIENT
Start: 2021-04-23 | End: 2021-04-23 | Stop reason: HOSPADM

## 2021-04-23 RX ORDER — ONDANSETRON 2 MG/ML
4 INJECTION INTRAMUSCULAR; INTRAVENOUS EVERY 6 HOURS PRN
Status: DISCONTINUED | OUTPATIENT
Start: 2021-04-23 | End: 2021-04-23 | Stop reason: HOSPADM

## 2021-04-23 RX ORDER — FAMOTIDINE 10 MG/ML
20 INJECTION, SOLUTION INTRAVENOUS ONCE AS NEEDED
Status: DISCONTINUED | OUTPATIENT
Start: 2021-04-23 | End: 2021-04-23 | Stop reason: HOSPADM

## 2021-04-23 RX ORDER — OXYTOCIN/0.9 % SODIUM CHLORIDE 30/500 ML
125 PLASTIC BAG, INJECTION (ML) INTRAVENOUS CONTINUOUS PRN
Status: DISCONTINUED | OUTPATIENT
Start: 2021-04-23 | End: 2021-04-23 | Stop reason: HOSPADM

## 2021-04-23 RX ORDER — IBUPROFEN 600 MG/1
600 TABLET ORAL EVERY 8 HOURS PRN
Status: DISCONTINUED | OUTPATIENT
Start: 2021-04-23 | End: 2021-04-25 | Stop reason: HOSPADM

## 2021-04-23 RX ORDER — MISOPROSTOL 200 UG/1
800 TABLET ORAL AS NEEDED
Status: DISCONTINUED | OUTPATIENT
Start: 2021-04-23 | End: 2021-04-23 | Stop reason: HOSPADM

## 2021-04-23 RX ORDER — FAMOTIDINE 10 MG/ML
20 INJECTION, SOLUTION INTRAVENOUS ONCE AS NEEDED
Status: CANCELLED | OUTPATIENT
Start: 2021-04-23

## 2021-04-23 RX ORDER — ACETAMINOPHEN 650 MG/1
650 SUPPOSITORY RECTAL EVERY 4 HOURS PRN
Status: DISCONTINUED | OUTPATIENT
Start: 2021-04-23 | End: 2021-04-23 | Stop reason: HOSPADM

## 2021-04-23 RX ORDER — ACETAMINOPHEN 325 MG/1
650 TABLET ORAL EVERY 4 HOURS PRN
Status: DISCONTINUED | OUTPATIENT
Start: 2021-04-23 | End: 2021-04-23 | Stop reason: HOSPADM

## 2021-04-23 RX ORDER — LIDOCAINE HYDROCHLORIDE AND EPINEPHRINE 15; 5 MG/ML; UG/ML
INJECTION, SOLUTION EPIDURAL
Status: COMPLETED | OUTPATIENT
Start: 2021-04-23 | End: 2021-04-23

## 2021-04-23 RX ORDER — FAMOTIDINE 20 MG/1
20 TABLET, FILM COATED ORAL ONCE AS NEEDED
Status: DISCONTINUED | OUTPATIENT
Start: 2021-04-23 | End: 2021-04-23 | Stop reason: HOSPADM

## 2021-04-23 RX ORDER — IBUPROFEN 600 MG/1
600 TABLET ORAL EVERY 6 HOURS PRN
Status: DISCONTINUED | OUTPATIENT
Start: 2021-04-23 | End: 2021-04-23 | Stop reason: HOSPADM

## 2021-04-23 RX ORDER — OXYCODONE AND ACETAMINOPHEN 7.5; 325 MG/1; MG/1
2 TABLET ORAL EVERY 4 HOURS PRN
Status: DISCONTINUED | OUTPATIENT
Start: 2021-04-23 | End: 2021-04-23 | Stop reason: HOSPADM

## 2021-04-23 RX ORDER — ONDANSETRON 2 MG/ML
4 INJECTION INTRAMUSCULAR; INTRAVENOUS EVERY 6 HOURS PRN
Status: DISCONTINUED | OUTPATIENT
Start: 2021-04-23 | End: 2021-04-25 | Stop reason: HOSPADM

## 2021-04-23 RX ORDER — PROMETHAZINE HYDROCHLORIDE 25 MG/1
12.5 TABLET ORAL EVERY 6 HOURS PRN
Status: DISCONTINUED | OUTPATIENT
Start: 2021-04-23 | End: 2021-04-23 | Stop reason: HOSPADM

## 2021-04-23 RX ORDER — PROMETHAZINE HYDROCHLORIDE 12.5 MG/1
12.5 SUPPOSITORY RECTAL EVERY 6 HOURS PRN
Status: DISCONTINUED | OUTPATIENT
Start: 2021-04-23 | End: 2021-04-25 | Stop reason: HOSPADM

## 2021-04-23 RX ORDER — DIPHENHYDRAMINE HCL 25 MG
25 CAPSULE ORAL NIGHTLY PRN
Status: DISCONTINUED | OUTPATIENT
Start: 2021-04-23 | End: 2021-04-25 | Stop reason: HOSPADM

## 2021-04-23 RX ORDER — ONDANSETRON 2 MG/ML
INJECTION INTRAMUSCULAR; INTRAVENOUS
Status: DISPENSED
Start: 2021-04-23 | End: 2021-04-23

## 2021-04-23 RX ORDER — ONDANSETRON 4 MG/1
4 TABLET, FILM COATED ORAL EVERY 6 HOURS PRN
Status: DISCONTINUED | OUTPATIENT
Start: 2021-04-23 | End: 2021-04-25 | Stop reason: HOSPADM

## 2021-04-23 RX ORDER — PROMETHAZINE HYDROCHLORIDE 25 MG/1
25 TABLET ORAL EVERY 6 HOURS PRN
Status: DISCONTINUED | OUTPATIENT
Start: 2021-04-23 | End: 2021-04-25 | Stop reason: HOSPADM

## 2021-04-23 RX ORDER — METHYLERGONOVINE MALEATE 0.2 MG/ML
200 INJECTION INTRAVENOUS ONCE AS NEEDED
Status: DISCONTINUED | OUTPATIENT
Start: 2021-04-23 | End: 2021-04-23 | Stop reason: HOSPADM

## 2021-04-23 RX ORDER — SODIUM CHLORIDE 0.9 % (FLUSH) 0.9 %
1-10 SYRINGE (ML) INJECTION AS NEEDED
Status: DISCONTINUED | OUTPATIENT
Start: 2021-04-23 | End: 2021-04-25 | Stop reason: HOSPADM

## 2021-04-23 RX ORDER — CALCIUM CARBONATE 200(500)MG
2 TABLET,CHEWABLE ORAL 3 TIMES DAILY PRN
Status: DISCONTINUED | OUTPATIENT
Start: 2021-04-23 | End: 2021-04-25 | Stop reason: HOSPADM

## 2021-04-23 RX ORDER — MORPHINE SULFATE 2 MG/ML
2 INJECTION, SOLUTION INTRAMUSCULAR; INTRAVENOUS
Status: DISCONTINUED | OUTPATIENT
Start: 2021-04-23 | End: 2021-04-23 | Stop reason: HOSPADM

## 2021-04-23 RX ORDER — OXYCODONE HYDROCHLORIDE AND ACETAMINOPHEN 5; 325 MG/1; MG/1
1 TABLET ORAL EVERY 4 HOURS PRN
Status: DISCONTINUED | OUTPATIENT
Start: 2021-04-23 | End: 2021-04-23 | Stop reason: HOSPADM

## 2021-04-23 RX ORDER — PRENATAL VIT/IRON FUM/FOLIC AC 27MG-0.8MG
1 TABLET ORAL DAILY
Status: DISCONTINUED | OUTPATIENT
Start: 2021-04-23 | End: 2021-04-25 | Stop reason: HOSPADM

## 2021-04-23 RX ORDER — CARBOPROST TROMETHAMINE 250 UG/ML
250 INJECTION, SOLUTION INTRAMUSCULAR AS NEEDED
Status: DISCONTINUED | OUTPATIENT
Start: 2021-04-23 | End: 2021-04-23 | Stop reason: HOSPADM

## 2021-04-23 RX ORDER — DOCUSATE SODIUM 100 MG/1
100 CAPSULE, LIQUID FILLED ORAL 2 TIMES DAILY
Status: DISCONTINUED | OUTPATIENT
Start: 2021-04-23 | End: 2021-04-25 | Stop reason: HOSPADM

## 2021-04-23 RX ORDER — BISACODYL 10 MG
10 SUPPOSITORY, RECTAL RECTAL DAILY PRN
Status: DISCONTINUED | OUTPATIENT
Start: 2021-04-24 | End: 2021-04-25 | Stop reason: HOSPADM

## 2021-04-23 RX ORDER — TRISODIUM CITRATE DIHYDRATE AND CITRIC ACID MONOHYDRATE 500; 334 MG/5ML; MG/5ML
30 SOLUTION ORAL ONCE
Status: DISCONTINUED | OUTPATIENT
Start: 2021-04-23 | End: 2021-04-23

## 2021-04-23 RX ORDER — ONDANSETRON 4 MG/1
4 TABLET, FILM COATED ORAL EVERY 6 HOURS PRN
Status: DISCONTINUED | OUTPATIENT
Start: 2021-04-23 | End: 2021-04-23 | Stop reason: HOSPADM

## 2021-04-23 RX ORDER — HYDROCORTISONE 25 MG/G
1 CREAM TOPICAL AS NEEDED
Status: DISCONTINUED | OUTPATIENT
Start: 2021-04-23 | End: 2021-04-25 | Stop reason: HOSPADM

## 2021-04-23 RX ORDER — OXYCODONE AND ACETAMINOPHEN 10; 325 MG/1; MG/1
1 TABLET ORAL EVERY 4 HOURS PRN
Status: DISCONTINUED | OUTPATIENT
Start: 2021-04-23 | End: 2021-04-25 | Stop reason: HOSPADM

## 2021-04-23 RX ORDER — OXYTOCIN/0.9 % SODIUM CHLORIDE 30/500 ML
999 PLASTIC BAG, INJECTION (ML) INTRAVENOUS ONCE
Status: COMPLETED | OUTPATIENT
Start: 2021-04-23 | End: 2021-04-23

## 2021-04-23 RX ORDER — EPHEDRINE SULFATE 50 MG/ML
10 INJECTION, SOLUTION INTRAVENOUS
Status: DISCONTINUED | OUTPATIENT
Start: 2021-04-23 | End: 2021-04-23

## 2021-04-23 RX ORDER — ROPIVACAINE HYDROCHLORIDE 2 MG/ML
INJECTION, SOLUTION EPIDURAL; INFILTRATION; PERINEURAL AS NEEDED
Status: DISCONTINUED | OUTPATIENT
Start: 2021-04-23 | End: 2021-04-24 | Stop reason: SURG

## 2021-04-23 RX ADMIN — IBUPROFEN 600 MG: 600 TABLET, FILM COATED ORAL at 21:35

## 2021-04-23 RX ADMIN — BENZOCAINE AND LEVOMENTHOL: 200; 5 SPRAY TOPICAL at 13:22

## 2021-04-23 RX ADMIN — OXYTOCIN-SODIUM CHLORIDE 0.9% IV SOLN 30 UNIT/500ML 999 ML/HR: 30-0.9/5 SOLUTION at 09:57

## 2021-04-23 RX ADMIN — OXYTOCIN-SODIUM CHLORIDE 0.9% IV SOLN 30 UNIT/500ML 250 ML/HR: 30-0.9/5 SOLUTION at 10:28

## 2021-04-23 RX ADMIN — ROPIVACAINE HYDROCHLORIDE 10 ML/HR: 2 INJECTION, SOLUTION EPIDURAL; INFILTRATION at 06:31

## 2021-04-23 RX ADMIN — PRENATAL VIT W/ FE FUMARATE-FA TAB 27-0.8 MG 1 TABLET: 27-0.8 TAB at 13:22

## 2021-04-23 RX ADMIN — SODIUM CHLORIDE, POTASSIUM CHLORIDE, SODIUM LACTATE AND CALCIUM CHLORIDE 999 ML/HR: 600; 310; 30; 20 INJECTION, SOLUTION INTRAVENOUS at 05:30

## 2021-04-23 RX ADMIN — LIDOCAINE HYDROCHLORIDE AND EPINEPHRINE 3 ML: 15; 5 INJECTION, SOLUTION EPIDURAL at 06:23

## 2021-04-23 RX ADMIN — SODIUM CHLORIDE, POTASSIUM CHLORIDE, SODIUM LACTATE AND CALCIUM CHLORIDE 125 ML/HR: 600; 310; 30; 20 INJECTION, SOLUTION INTRAVENOUS at 06:28

## 2021-04-23 RX ADMIN — DOCUSATE SODIUM 100 MG: 100 CAPSULE ORAL at 21:35

## 2021-04-23 RX ADMIN — OXYCODONE HYDROCHLORIDE AND ACETAMINOPHEN 1 TABLET: 5; 325 TABLET ORAL at 13:22

## 2021-04-23 RX ADMIN — ONDANSETRON HYDROCHLORIDE 4 MG: 2 SOLUTION INTRAMUSCULAR; INTRAVENOUS at 08:46

## 2021-04-23 RX ADMIN — ROPIVACAINE HYDROCHLORIDE 11 ML: 2 INJECTION, SOLUTION EPIDURAL; INFILTRATION at 06:27

## 2021-04-23 RX ADMIN — OXYCODONE HYDROCHLORIDE AND ACETAMINOPHEN 1 TABLET: 5; 325 TABLET ORAL at 21:35

## 2021-04-23 NOTE — PLAN OF CARE
Goal Outcome Evaluation:  Plan of Care Reviewed With: patient  Progress: improving  Outcome Summary: , 39.5 elective induction.  VSS.  SVE 7/-80%/-1.  Epidural in place.  Pitcoin infusing at 10mL/hr.  Pain 0/10 post epidural placement.  Plans for vaginal delivery and to breastfeed.

## 2021-04-23 NOTE — L&D DELIVERY NOTE
[facility]  Vaginal Delivery Note    Delivery     Delivery: Vaginal, Spontaneous     YOB: 2023    Time of Birth:  Gestational Age 9:57 AM   39w5d     Anesthesia: Epidural     Delivering clinician: Sherice Chinchilla    Forceps?   No   Vacuum? No    Shoulder dystocia present: No        Delivery narrative:  Progressed to C/C/+2 and pushed well to deliver a LVIF. Loose nuchal x2 reduced prior to delivery. REJI to LOT vigorous to mom's abdomen. Placenta spontaneous and intact with 3VC after IV Pitocin. 1st degree laceration repaired with 2-0 Chromic. Epidural anes. EBL see recordmL. Moving all extremities. No complications. Sponge and needle count correct.        Infant    Findings: female  infant     Infant observations: Weight: No birth weight on file.   Length:   in  Observations/Comments:        Apgars:   @ 1 minute /      @ 5 minutes   Infant Name: Yoanna     Placenta, Cord, and Fluid    Placenta delivered  Spontaneous  at   4/23/2021 10:03 AM     Cord: 3 vessels  present.   Nuchal Cord?  yes; Number of nuchal loops present:  2    Cord blood obtained: Yes    Cord gases obtained:  Yes    Cord gas results: Venous:  No results found for: PHCVEN    Arterial:  No results found for: PHCART     Repair    Episiotomy: None     No    Lacerations: Yes  Laceration Information  Laceration Repaired?   Perineal: 1st  Yes    Periurethral:       Labial:       Sulcus:       Vaginal:       Cervical:         Suture used for repair: 2-0 chromic gut   Estimated Blood Loss:             Complications  none    Disposition  Mother to Remain in LD  in stable condition currently.  Baby to remains with mom  in stable condition currently.      Sherice Chinchilla DO  04/23/21  10:21 CDT

## 2021-04-23 NOTE — ANESTHESIA PROCEDURE NOTES
Labor Epidural      Patient location during procedure: OB  Performed By  CRNA: Karthik Bolton CRNA  Preanesthetic Checklist  Completed: patient identified, IV checked, site marked, risks and benefits discussed, surgical consent, monitors and equipment checked, pre-op evaluation and timeout performed  Prep:  Pt Position:sitting  Sterile Tech:cap, gloves, mask and sterile barrier  Prep:povidone-iodine 7.5% surgical scrub  Monitoring:blood pressure monitoring and continuous pulse oximetry  Epidural Block Procedure:  Approach:midline  Guidance:landmark technique and palpation technique  Location:L4-L5  Needle Type:Tuohy  Needle Gauge:18 G  Loss of Resistance Medium: air  Loss of Resistance: 7cm  Cath Depth at skin:14 cm  Paresthesia: none  Aspiration:negative  Test Dose:negative  Test dose medication: lidocaine 1.5%-EPINEPHrine 1:200,000 (XYLOCAINE W/EPI) injection, 3 mL  Med administered at 4/23/2021 6:23 AM  Number of Attempts: 1  Post Assessment:  Dressing:occlusive dressing applied and secured with tape  Pt Tolerance:patient tolerated the procedure well with no apparent complications  Complications:no

## 2021-04-23 NOTE — PROGRESS NOTES
Maya Rodriguez  : 2000  MRN: [id number]  CSN: [id number]    Labor progress note    Subjective   She reports is having good pain control with the epidural     Objective   Min/max vitals past 24 hours:  Temp  Min: 97.6 °F (36.4 °C)  Max: 98.5 °F (36.9 °C)   BP  Min: 103/65  Max: 135/79   Pulse  Min: 59  Max: 87   Resp  Min: 16  Max: 18        FHT's: reactive, reassuring and category 1.  external monitors used   Cervix: was checked (by me): 7 cm / 70 % / -2   Contractions: regular every 2-4 minutes - external monitors used      Assessment   1. IUP at 39w5d  2. EIOL  3. GBS negative     Plan   1.   AROM - bloody fluid fluid seen  Allow labor to continue pending maternal and fetal status  Plan discussed with family and questions answered.  Understanding verbalized.    Sherice Chinchilla DO  2021  08:02 CDT

## 2021-04-23 NOTE — LACTATION NOTE
"Infant:  Lurdes    Feeding Plan  Assisted with first bfing session. Mother reports only wants to bf about 2 days for colostrum.     Bfing hx:  She bf'ed 2 wks with now 3 year old. She was not comfortable with bfing and did not wish to continue. Had issue of plugged ducts with other child.     Educated on superiority of her milk, how it changes to meet baby's needs, and how her body will likely produce more with this second child. Explained how she must slowly stop bfing, if chooses to formula feed, to avoid plugged ducts/mastitis.  Mother not overly communicative. She discloses to physician that she just learned she was pregnant 3 wks ago.     First Feeding  Infant latched easily onto breast. Nipples well-everted. Lurdes gaped wide, rooting, taking in large portion of areola with rhythmic sucking noted. Mother voiced felt tugging sensation, but no pain. Much praise given. Mother semi-reclined with infant diagonal across chest; modified cross cradle. Mother raising infant's head and \"pushing\" it down onto nipple. Explained / demonstrated allowing infant to lower head, herself. Much praise given for successful bfing. Urged skin to skin holding for bonding and brain dev especially if formula is given. Instructed mother to feed at both breasts, 15-20 mins each, at first cues (these explained) going no longer than 3 hours before feeding again.     BFing  Great [employer] Book given, bfing log started by pt's sister, video list, and storage magnet.    Pump  Did not inquire about double electric pump for home use as pt says will only bf for 2 days. (Manual may suffice for comfort pumping; has not been given yet.)    "

## 2021-04-23 NOTE — PLAN OF CARE
Goal Outcome Evaluation:      VVS.  Fundus F, ML, U1-UU.  Small lochia.  Pt has first degree laceration, sitz bath and comfort products at bedside.  Breastfeeding with no assistance and bonding appr with infant.  PO pain meds controlling pain.

## 2021-04-23 NOTE — ANESTHESIA PREPROCEDURE EVALUATION
Anesthesia Evaluation                  Airway   Mallampati: I  TM distance: >3 FB  No difficulty expected  Dental      Pulmonary - normal exam   Cardiovascular - normal exam        Neuro/Psych  GI/Hepatic/Renal/Endo      Musculoskeletal     Abdominal    Substance History      OB/GYN    (+) Pregnant,         Other                        Anesthesia Plan    ASA 2     epidural       Anesthetic plan, all risks, benefits, and alternatives have been provided, discussed and informed consent has been obtained with: patient.

## 2021-04-23 NOTE — LACTATION NOTE
Mother's Name: Lamar Phone #:  Infant Name: Yoanna  :2023  Gestation:39w5d  Day of life:0  Birth weight:  7-8.3 (3410g) Discharge weight:  Weight Loss:   24 hour Summary of Feeds:  Voids:  Stools:  Assistive devices (shields, shells, etc):  Significant Maternal history:, Chlamydia  Maternal Concerns:    Maternal Goal: Provide colostrum then formula feed exclusively  Mother's Medications: PNV  Breastpump for home:   Ped follow up appt:    Instructed mom our lactation team is here for continued support throughout their breastfeeding journey. Our team has encouraged mom to call with any questions or concerns that may arise after discharge.

## 2021-04-24 LAB
BASOPHILS # BLD AUTO: 0.03 10*3/MM3 (ref 0–0.2)
BASOPHILS NFR BLD AUTO: 0.4 % (ref 0–1.5)
DEPRECATED RDW RBC AUTO: 39.9 FL (ref 37–54)
EOSINOPHIL # BLD AUTO: 0.14 10*3/MM3 (ref 0–0.4)
EOSINOPHIL NFR BLD AUTO: 1.9 % (ref 0.3–6.2)
ERYTHROCYTE [DISTWIDTH] IN BLOOD BY AUTOMATED COUNT: 13.3 % (ref 12.3–15.4)
HCT VFR BLD AUTO: 25.7 % (ref 34–46.6)
HGB BLD-MCNC: 7.9 G/DL (ref 12–15.9)
IMM GRANULOCYTES # BLD AUTO: 0.03 10*3/MM3 (ref 0–0.05)
IMM GRANULOCYTES NFR BLD AUTO: 0.4 % (ref 0–0.5)
LYMPHOCYTES # BLD AUTO: 2.27 10*3/MM3 (ref 0.7–3.1)
LYMPHOCYTES NFR BLD AUTO: 31.6 % (ref 19.6–45.3)
MCH RBC QN AUTO: 25.2 PG (ref 26.6–33)
MCHC RBC AUTO-ENTMCNC: 30.7 G/DL (ref 31.5–35.7)
MCV RBC AUTO: 82.1 FL (ref 79–97)
MONOCYTES # BLD AUTO: 0.44 10*3/MM3 (ref 0.1–0.9)
MONOCYTES NFR BLD AUTO: 6.1 % (ref 5–12)
NEUTROPHILS NFR BLD AUTO: 4.28 10*3/MM3 (ref 1.7–7)
NEUTROPHILS NFR BLD AUTO: 59.6 % (ref 42.7–76)
NRBC BLD AUTO-RTO: 0 /100 WBC (ref 0–0.2)
PLATELET # BLD AUTO: 155 10*3/MM3 (ref 140–450)
PMV BLD AUTO: 11.9 FL (ref 6–12)
RBC # BLD AUTO: 3.13 10*6/MM3 (ref 3.77–5.28)
WBC # BLD AUTO: 7.19 10*3/MM3 (ref 3.4–10.8)

## 2021-04-24 PROCEDURE — 85025 COMPLETE CBC W/AUTO DIFF WBC: CPT | Performed by: OBSTETRICS & GYNECOLOGY

## 2021-04-24 PROCEDURE — 0503F POSTPARTUM CARE VISIT: CPT | Performed by: OBSTETRICS & GYNECOLOGY

## 2021-04-24 RX ADMIN — IBUPROFEN 600 MG: 600 TABLET, FILM COATED ORAL at 19:55

## 2021-04-24 RX ADMIN — DOCUSATE SODIUM 100 MG: 100 CAPSULE ORAL at 19:55

## 2021-04-24 RX ADMIN — PRENATAL VIT W/ FE FUMARATE-FA TAB 27-0.8 MG 1 TABLET: 27-0.8 TAB at 09:05

## 2021-04-24 RX ADMIN — IBUPROFEN 600 MG: 600 TABLET, FILM COATED ORAL at 09:07

## 2021-04-24 RX ADMIN — DOCUSATE SODIUM 100 MG: 100 CAPSULE ORAL at 09:05

## 2021-04-24 NOTE — PROGRESS NOTES
[facility]  [facility] Progress [facility]    Subjective   Postpartum Day 1: Vaginal Delivery    The patient feels well.  Her pain is well controlled with ibuprofen (OTC).  She had one dose of percocet yesterday, but has not taken any more.   She is ambulating well.  Patient describes her bleeding as thin lochia.    Breastfeeding: infant latching without difficulty.    Objective     Vital Signs Range for the last 24 hours  Temperature: Temp:  [97.4 °F (36.3 °C)-98.2 °F (36.8 °C)] 98.1 °F (36.7 °C)   Temp Source: Temp src: Oral   BP: BP: ()/(65-83) 114/65   Pulse: Heart Rate:  [57-88] 57   Respirations: Resp:  [16-18] 18   SPO2: SpO2:  [98 %-100 %] 98 %   O2 Amount (l/min):     O2 Devices Device (Oxygen Therapy): room air   Weight:       Admit Height:  Height: 177.8 cm (70\")      Physical Exam:  General:  no acute distresss.  Lungs:  breathing is unlabored  Abdomen: Fundus: appropriate, firm, non tender  Extremities: normal, atraumatic, no cyanosis, and Negative edema.     Lab results reviewed:  Yes   Rubella:  No results found for: RUBELLAIGGIN Nurse Transcribed from prenatal record --  No components found for: EXTRUBELQUAL  Rh Status:    RH type   Date Value Ref Range Status   04/22/2021 Positive  Final     Immunizations:   Immunization History   Administered Date(s) Administered   • Tdap 03/23/2018     Lab Results (last 24 hours)     Procedure Component Value Units Date/Time    CBC & Differential [[id number]]  (Abnormal) Collected: 04/24/21 0534    Specimen: Blood Updated: 04/24/21 0556    Narrative:      The following orders were created for panel order CBC & Differential.  Procedure                               Abnormality         Status                     ---------                               -----------         ------                     CBC Auto Differential[[id number]]        Abnormal            Final result                 Please view results for these tests on the individual orders.    CBC Auto " "Differential [[id number]]  (Abnormal) Collected: 04/24/21 0534    Specimen: Blood Updated: 04/24/21 0556     WBC 7.19 10*3/mm3      RBC 3.13 10*6/mm3      Hemoglobin 7.9 g/dL      Hematocrit 25.7 %      MCV 82.1 fL      MCH 25.2 pg      MCHC 30.7 g/dL      RDW 13.3 %      RDW-SD 39.9 fl      MPV 11.9 fL      Platelets 155 10*3/mm3      Neutrophil % 59.6 %      Lymphocyte % 31.6 %      Monocyte % 6.1 %      Eosinophil % 1.9 %      Basophil % 0.4 %      Immature Grans % 0.4 %      Neutrophils, Absolute 4.28 10*3/mm3      Lymphocytes, Absolute 2.27 10*3/mm3      Monocytes, Absolute 0.44 10*3/mm3      Eosinophils, Absolute 0.14 10*3/mm3      Basophils, Absolute 0.03 10*3/mm3      Immature Grans, Absolute 0.03 10*3/mm3      nRBC 0.0 /100 WBC     Chlamydia trachomatis, Neisseria gonorrhoeae, PCR - Urine, Urine, Clean Catch [[id number]]  (Normal) Collected: 04/23/21 0754    Specimen: Urine, Clean Catch Updated: 04/23/21 1556     Chlamydia DNA by PCR Not Detected     Neisseria gonorrhoeae by PCR Not Detected    Narrative:      Disclaimer: The Aptima Combo 2 assay is a target amplification nucleic acid probe test that utilizes target capture for the in vitro qualitative detection and differentiation of ribosomal RNA from Chlamydia trachomatis and Neisseria gonorrhoeae to aid in the diagnosis of chlamydial and/or gonococcal urogenital disease.  Cell culture was once considered to be the \"gold standard\" for detection of CT and NG.  Culture is quite specific, but scientific studies have demonstrated that the NAAT DNA probe technologies have higher clinical sensitivities than culture.    Tissue Pathology Exam [[id number]] Collected: 04/23/21 1020    Specimen: Tissue from Placenta Updated: 04/23/21 0545          Assessment/Plan       Encounter for elective induction of labor      Lamar Rodriguez is Day 1  post-partum  Vaginal, Spontaneous   .      Plan:  Continue current care.  Patient with anemia, but was only 8.7 at time of admission. "  She is asymptomatic and has no complaints.      Jeny Hirsch MD  4/24/2021  09:57 CDT

## 2021-04-24 NOTE — LACTATION NOTE
"Mother's Name: Lamar Phone #:  Infant Name: Yoanna  :2023  Gestation:39w5d  Day of life:1  Birth weight:  7-8.3 (3410g) Discharge weight:  Weight Loss: -2.14%  24 hour Summary of Feeds: 7BF  Voids: 2 Stools:4  Assistive devices (shields, shells, etc):NA  Significant Maternal history:, Chlamydia  Maternal Concerns:    Maternal Goal: Provide colostrum then formula feed exclusively  Mother's Medications: PNV  Breastpump for home: RX FAXED  Ped follow up appt:    Follow up with mother to discuss breastfeeding progress. Infant actively feeding at this time. Mom unsure of breastfeeding progress, states \" I just do not like not knowing how much she is getting\". Reassured mother these are normal concerns, discussed signs of adequate intake. Reviewed feeding hx, voids, stools and weight loss. Praise provided for apparent success thus far! Reiterated the need to wake infant every 3 hours to offer feedings or feed more frequently per infant hunger cues, offering both breast every feeding attempt. Mother denies further questions at this time. Encouragement and support provided.     Instructed mom our lactation team is here for continued support throughout their breastfeeding journey. Our team has encouraged mom to call with any questions or concerns that may arise after discharge.  "

## 2021-04-24 NOTE — CASE MANAGEMENT/SOCIAL WORK
"Continued Stay Note   [facility]     Patient Name: Lamar Rodriguez  MRN: [id number]  Today's Date: 4/24/2021    Admit Date: 4/22/2021    Discharge Plan     Row Name 04/24/21 1537       Plan    Plan Comments  SW received consult stating \"late prenatal care.\" SW spoke with mom and grandma in the room who states that mom did not know that she was pregnant. Pt lives at home with her mom along with her dad and her child. Mom states that she is safe even though dad is not in the picture. Mom denies any needs at this time. Mom states that she has everything at home ready for baby. Please contact JASMIN with any other concerns        Discharge Codes    No documentation.             Nayana Ahumada    "

## 2021-04-24 NOTE — PLAN OF CARE
Goal Outcome Evaluation:  Plan of Care Reviewed With: patient  Progress: improving  Outcome Summary: VSS; FFMLU2 with scant rubra; using comfort products as needed for 1st degree lac; up ad em; voiding with no issues; very pleasant and attentive to  needs. Breastfeeding independently.

## 2021-04-24 NOTE — PLAN OF CARE
Problem: Adult Inpatient Plan of Care  Goal: Absence of Hospital-Acquired Illness or Injury  Intervention: Identify and Manage Fall Risk  Recent Flowsheet Documentation  Taken 4/24/2021 0803 by Tatyana Serra RN  Safety Promotion/Fall Prevention: safety round/check completed  Intervention: Prevent Skin Injury  Recent Flowsheet Documentation  Taken 4/24/2021 0803 by Tatyana Serra, RN  Body Position: supine     Problem: Bleeding (Labor)  Goal: Hemostasis  Outcome: Met     Problem: Change in Fetal Wellbeing (Labor)  Goal: Stable Fetal Wellbeing  Outcome: Met  Intervention: Promote and Monitor Fetal Wellbeing  Recent Flowsheet Documentation  Taken 4/24/2021 0803 by Tatyana Serra RN  Body Position: supine     Problem: Delayed Labor Progression (Labor)  Goal: Effective Progression to Delivery  Outcome: Met     Problem: Infection (Labor)  Goal: Absence of Infection Signs and Symptoms  Outcome: Met     Problem: Labor Pain (Labor)  Goal: Acceptable Pain Control  Outcome: Met     Problem: Uterine Tachysystole (Labor)  Goal: Normal Uterine Contraction Pattern  Outcome: Met     Problem: Adjustment to Role Transition (Postpartum Vaginal Delivery)  Goal: Successful Maternal Role Transition  Outcome: Ongoing, Progressing     Problem: Bleeding (Postpartum Vaginal Delivery)  Goal: Hemostasis  Outcome: Ongoing, Progressing     Problem: Infection (Postpartum Vaginal Delivery)  Goal: Absence of Infection Signs and Symptoms  Outcome: Ongoing, Progressing     Problem: Pain (Postpartum Vaginal Delivery)  Goal: Acceptable Pain Control  Outcome: Ongoing, Progressing     Problem: Urinary Retention (Postpartum Vaginal Delivery)  Goal: Effective Urinary Elimination  Outcome: Met   Goal Outcome Evaluation:

## 2021-04-25 VITALS
HEIGHT: 70 IN | DIASTOLIC BLOOD PRESSURE: 78 MMHG | SYSTOLIC BLOOD PRESSURE: 120 MMHG | TEMPERATURE: 97.6 F | RESPIRATION RATE: 16 BRPM | BODY MASS INDEX: 25.97 KG/M2 | WEIGHT: 181.4 LBS | HEART RATE: 56 BPM | OXYGEN SATURATION: 100 %

## 2021-04-25 PROBLEM — Z34.90 PREGNANCY: Status: RESOLVED | Noted: 2018-03-09 | Resolved: 2021-04-25

## 2021-04-25 PROBLEM — O26.899 ABDOMINAL PAIN AFFECTING PREGNANCY: Status: RESOLVED | Noted: 2018-01-05 | Resolved: 2021-04-25

## 2021-04-25 PROBLEM — Z34.90 TERM PREGNANCY: Status: RESOLVED | Noted: 2018-03-20 | Resolved: 2021-04-25

## 2021-04-25 PROBLEM — R10.9 ABDOMINAL PAIN AFFECTING PREGNANCY: Status: RESOLVED | Noted: 2018-01-05 | Resolved: 2021-04-25

## 2021-04-25 PROBLEM — Z34.90 ENCOUNTER FOR ELECTIVE INDUCTION OF LABOR: Status: RESOLVED | Noted: 2021-04-22 | Resolved: 2021-04-25

## 2021-04-25 PROCEDURE — 0503F POSTPARTUM CARE VISIT: CPT | Performed by: OBSTETRICS & GYNECOLOGY

## 2021-04-25 RX ADMIN — PRENATAL VIT W/ FE FUMARATE-FA TAB 27-0.8 MG 1 TABLET: 27-0.8 TAB at 10:40

## 2021-04-25 RX ADMIN — DOCUSATE SODIUM 100 MG: 100 CAPSULE ORAL at 10:40

## 2021-04-25 NOTE — ANESTHESIA POSTPROCEDURE EVALUATION
Patient: Lamar Rodriguez    Procedure Summary     Date: 04/23/21 Room / Location:     Anesthesia Start: 0613 Anesthesia Stop: 0957    Procedure: LABOR ANALGESIA Diagnosis:     Scheduled Providers:  Provider: Karthik Bolton CRNA    Anesthesia Type: epidural ASA Status: 2          Anesthesia Type: epidural    Vitals  Vitals Value Taken Time   /65 04/24/21 0803   Temp 98.1 °F (36.7 °C) 04/24/21 0803   Pulse 57 04/24/21 0803   Resp 18 04/24/21 0803   SpO2 98 % 04/24/21 0803           Post Anesthesia Care and Evaluation    Patient location during evaluation: floor  Patient participation: complete - patient participated  Level of consciousness: awake  Pain management: adequate  Airway patency: patent  Anesthetic complications: No anesthetic complications  PONV Status: none  Cardiovascular status: acceptable  Respiratory status: acceptable  Hydration status: acceptable  Post Neuraxial Block status: Motor and sensory function returned to baseline and No signs or symptoms of PDPH

## 2021-04-25 NOTE — DISCHARGE SUMMARY
Discharge Summary     Maya Rodriguez  : 2000  MRN: [id number]  CSN: [id number]    Date of Admission: 2021   Date of Discharge:  2021   Delivering Physician: Sherice Chinchilla        Admission Diagnosis: 1. Encounter for elective induction of labor [Z34.90]   Discharge Diagnosis: 1. Pregnancy at 40w0d - delivered       Procedures: 2021  - Vaginal, Spontaneous       Hospital Course  Patient is a 21 y.o.  who at 40w0d had a vaginal birth.  Her postpartum course was without complications.  On PPD #2 she was ready for discharge.  She had normal lochia and pain well controlled with oral medications.    Infant  female  fetus weighing 3410 g (7 lb 8.3 oz)   Apgars -  8 @ 1 minute /  9 @ 5 minutes.    Discharge labs  Lab Results   Component Value Date    WBC 7.19 2021    HGB 7.9 (L) 2021    HCT 25.7 (L) 2021     2021       Discharge Medications     Discharge Medications      Continue These Medications      Instructions Start Date   prenatal vitamin 27-0.8 27-0.8 MG tablet tablet   1 tablet, Oral, Daily             Discharge Disposition Home or Self Care   Condition on Discharge: good   Follow-up: 6 weeks with Rufina Hirsch MD  2021

## 2021-04-25 NOTE — PLAN OF CARE
Goal Outcome Evaluation:  Plan of Care Reviewed With: patient  Progress: improving  Outcome Summary: Patient up and ambulating room; voiding with out any issues; breastfeeding independently; EPDS of 4; taking only motrin for pain; VSS; FFMLU3 with scant rubra.

## 2021-04-25 NOTE — PROGRESS NOTES
"[facility]  Vaginal Delivery Progress Note    Subjective   Postpartum Day 2: Vaginal Delivery    The patient feels well.  Her pain is well controlled with ibuprofen (OTC).  She had one dose of percocet the evening of delivery, but has not taken any more.   She is ambulating well.  Patient describes her bleeding as thin lochia.    Breastfeeding: infant latching without difficulty.    Objective     Vital Signs Range for the last 24 hours  Temperature: Temp:  [98.1 °F (36.7 °C)-98.4 °F (36.9 °C)] 98.4 °F (36.9 °C)   Temp Source: Temp src: Temporal   BP: BP: (114-120)/(64-65) 120/64   Pulse: Heart Rate:  [57-66] 66   Respirations: Resp:  [16-18] 16   SPO2: SpO2:  [98 %] 98 %   O2 Amount (l/min):     O2 Devices Device (Oxygen Therapy): room air   Weight:       Admit Height:  Height: 177.8 cm (70\")      Physical Exam:  General:  no acute distresss.  Lungs:  breathing is unlabored  Abdomen: Fundus: appropriate, firm, non tender  Extremities: normal, atraumatic, no cyanosis, and Negative edema.     Lab results reviewed:  Yes   Rubella:  No results found for: RUBELLAIGGIN Nurse Transcribed from prenatal record --  No components found for: EXTRUBELQUAL  Rh Status:    No results found for: RH  Immunizations:   Immunization History   Administered Date(s) Administered   • Tdap 03/23/2018     Lab Results (last 24 hours)     ** No results found for the last 24 hours. **          Assessment/Plan       Encounter for elective induction of labor      Lamar Rodriguez is Day 2  post-partum  Vaginal, Spontaneous   .      Plan:  Discharge home today.  Patient with anemia, but was only 8.7 at time of admission.  She is asymptomatic and has no complaints.  Breastfeeding going well.      Jeny Hirsch MD  4/25/2021  07:37 CDT  "

## 2021-04-26 NOTE — PAYOR COMM NOTE
"Ref  [id number]    [facility]  [place],  [phone]  OR  FAX   [phone]    Lamar Montiel (21 y.o. Female)     Date of Birth Social Security Number Address Home Phone MRN    2000  [address] [place] [address] [phone] [id number]    Sabianist Marital Status          Other Single       Admission Date Admission Type Admitting Provider Attending Provider Department, Room/Bed    21 Elective Sherice Chinchilla DO  [facility] MOTHER BABY 2A, M243/1    Discharge Date Discharge Disposition Discharge Destination        2021 Home or Self Care              Attending Provider: (none)   Allergies: No Known Allergies    Isolation: None   Infection: None   Code Status: Prior    Ht: 177.8 cm (70\")   Wt: 82.3 kg (181 lb 6.4 oz)    Admission Cmt: None   Principal Problem: None                Active Insurance as of 2021     Primary Coverage     Payor Plan Insurance Group Employer/Plan Group    [employer] - ASA [id number]     Payor Plan Address Payor Plan Phone Number Payor Plan Fax Number Effective Dates    [address]   2016 - None Entered    [facility] [id number]       Subscriber Name Subscriber Birth Date Member ID       RENEE MONTIEL 1974 [id number]                 Emergency Contacts      (Rel.) Home Phone Work Phone Mobile Phone    Renee Montiel (Mother) [phone] -- --        Madeline Dorsey RN   Registered Nurse   Obstetrics   Plan of Care   Signed   Date of Service:  21   Creation Time:  21            Signed                        To ldr    2021 AT 9:57am  Vaginal delivery   Female apgar 8/9   Weight 3410 grams     edc  3031 40/1 wks gestatioanl age  g-2 p-1     History & Physical      Sherice Chinchilla DO at 21 0744        H&P reviewed. The patient was examined and there are no changes to the H&P.    Electronically signed by Sherice Chinchilla DO at 21 0739   Source Note      "     [facility]  Lamar Montiel  : 2000  MRN: [id number]  CSN: [id number]    History and Physical    Subjective   Lamar Montiel is a 21 y.o. year old  with an Estimated Date of Delivery: 21 scheduled for induction of labor on 2021 due to elective induction of labor, patient request.   Prenatal care has been with Dr. Rufina Chinchilla.  It has been complicated by chlamydia and scant prenatal care.    OB History    Para Term  AB Living   2 1 1 0 0 1   SAB TAB Ectopic Molar Multiple Live Births   0 0 0 0 0 1      # Outcome Date GA Lbr Christophe/2nd Weight Sex Delivery Anes PTL Lv   2 Current            1 Term 18 39w2d  3270 g (7 lb 3.3 oz) F Vag-Spont EPI N ERICA      Name: NAREN MONTIEL      Apgar1: 9  Apgar5: 9     Past Medical History:   Diagnosis Date   • Chlamydia     prior to pregnancy     Past Surgical History:   Procedure Laterality Date   • MOUTH SURGERY         Current Outpatient Medications:   •  Prenatal Vit-Fe Fumarate-FA (prenatal vitamin 27-0.8) 27-0.8 MG tablet tablet, Take 1 tablet by mouth Daily., Disp: , Rfl:     No Known Allergies  Social History    Tobacco Use      Smoking status: Never Smoker      Smokeless tobacco: Never Used    Review of Systems   Gastrointestinal: Negative for constipation and diarrhea.   Genitourinary: Negative for pelvic pain, vaginal bleeding and vaginal discharge.         Objective   There were no vitals taken for this visit.  General: well developed; well nourished  no acute distress   Heart: Not performed.   Lungs: breathing is unlabored   Abdomen:  Cervix: soft, non-tender; no masses  no umbilical or inguinal hernias are present  no hepato-splenomegaly  was checked (by me): 3-4 cm / 60 % / -3  EFW 7.5 pounds  Pelvis seems clinically adequate         Prenatal Labs  Lab Results   Component Value Date    HGB 10.3 (L) 2021    HEPBSAG Negative 2021    ABO O 2021    RH Positive 2021    ABSCRN Negative 2021     SAT1BZR2 Non Reactive 2021    URINECX Final report 2021       Recent Labs  Lab Results   Component Value Date    HGB 10.3 (L) 2021    HCT 33.8 (L) 2021    WBC 5.57 2021     2021           Assessment   1. IUP with an Estimated Date of Delivery: 21  2. Induction of labor scheduled on 2021 for elective - patient's request.  3. GBS negative   4. Positive chlamydia treated         Plan   1. Risks and benefits of induction discussed.  Patient understands that IOL increases the risk of  delivery over spontaneous labor, especially if the patient does not have a favorable cervix.    Sherice Chinchilla DO  2021              Electronically signed by Sherice Chinchilla DO at 21 1314             Sherice Chinchilla DO at 21 1307           Maya Montiel  : 2000  MRN: [id number]  CSN: [id number]    History and Physical    Subjective   Lamar Montiel is a 21 y.o. year old  with an Estimated Date of Delivery: 21 scheduled for induction of labor on 2021 due to elective induction of labor, patient request.   Prenatal care has been with Dr. Rufina Chinchilla.  It has been complicated by chlamydia and scant prenatal care.    OB History    Para Term  AB Living   2 1 1 0 0 1   SAB TAB Ectopic Molar Multiple Live Births   0 0 0 0 0 1      # Outcome Date GA Lbr Christophe/2nd Weight Sex Delivery Anes PTL Lv   2 Current            1 Term 18 39w2d  3270 g (7 lb 3.3 oz) F Vag-Spont EPI N ERICA      Name: NAREN MONTIEL      Apgar1: 9  Apgar5: 9     Past Medical History:   Diagnosis Date   • Chlamydia     prior to pregnancy     Past Surgical History:   Procedure Laterality Date   • MOUTH SURGERY         Current Outpatient Medications:   •  Prenatal Vit-Fe Fumarate-FA (prenatal vitamin 27-0.8) 27-0.8 MG tablet tablet, Take 1 tablet by mouth Daily., Disp: , Rfl:     No Known Allergies  Social History    Tobacco  Use      Smoking status: Never Smoker      Smokeless tobacco: Never Used    Review of Systems   Gastrointestinal: Negative for constipation and diarrhea.   Genitourinary: Negative for pelvic pain, vaginal bleeding and vaginal discharge.         Objective   There were no vitals taken for this visit.  General: well developed; well nourished  no acute distress   Heart: Not performed.   Lungs: breathing is unlabored   Abdomen:  Cervix: soft, non-tender; no masses  no umbilical or inguinal hernias are present  no hepato-splenomegaly  was checked (by me): 3-4 cm / 60 % / -3  EFW 7.5 pounds  Pelvis seems clinically adequate         Prenatal Labs  Lab Results   Component Value Date    HGB 10.3 (L) 2021    HEPBSAG Negative 2021    ABO O 2021    RH Positive 2021    ABSCRN Negative 2021    [id number] Non Reactive 2021    URINECX Final report 2021       Recent Labs  Lab Results   Component Value Date    HGB 10.3 (L) 2021    HCT 33.8 (L) 2021    WBC 5.57 2021     2021           Assessment   5. IUP with an Estimated Date of Delivery: 21  6. Induction of labor scheduled on 2021 for elective - patient's request.  7. GBS negative   8. Positive chlamydia treated         Plan   2. Risks and benefits of induction discussed.  Patient understands that IOL increases the risk of  delivery over spontaneous labor, especially if the patient does not have a favorable cervix.    Sherice Chinchilla DO  2021              Electronically signed by Sherice Chinchilla DO at 21 1314          Operative/Procedure Notes (last 7 days) (Notes from 21 1533 through 21 1533)      Sherice Chinchilla DO at 21 1021          [facility]  Vaginal Delivery Note    Delivery     Delivery: Vaginal, Spontaneous     YOB: 2023    Time of Birth:  Gestational Age 9:57 AM   39w5d     Anesthesia: Epidural     Delivering clinician:  Sherice Chinchilla    Forceps?   No   Vacuum? No    Shoulder dystocia present: No        Delivery narrative:  Progressed to C/C/+2 and pushed well to deliver a LVIF. Loose nuchal x2 reduced prior to delivery. REJI to LOT vigorous to mom's abdomen. Placenta spontaneous and intact with 3VC after IV Pitocin. 1st degree laceration repaired with 2-0 Chromic. Epidural anes. EBL see recordmL. Moving all extremities. No complications. Sponge and needle count correct.        Infant    Findings: female  infant     Infant observations: Weight: No birth weight on file.   Length:   in  Observations/Comments:        Apgars:   @ 1 minute /      @ 5 minutes   Infant Name: Yoanna     Placenta, Cord, and Fluid    Placenta delivered  Spontaneous  at   2021 10:03 AM     Cord: 3 vessels  present.   Nuchal Cord?  yes; Number of nuchal loops present:  2    Cord blood obtained: Yes    Cord gases obtained:  Yes    Cord gas results: Venous:  No results found for: PHCVEN    Arterial:  No results found for: PHCART     Repair    Episiotomy: None     No    Lacerations: Yes  Laceration Information  Laceration Repaired?   Perineal: 1st  Yes    Periurethral:       Labial:       Sulcus:       Vaginal:       Cervical:         Suture used for repair: 2-0 chromic gut   Estimated Blood Loss:             Complications  none    Disposition  Mother to Remain in LD  in stable condition currently.  Baby to remains with mom  in stable condition currently.      Sherice Chinchilla DO  21  10:21 CDT        Electronically signed by Sherice Chinchilla DO at 21 1022          Discharge Summary      Jeny Hirsch MD at 21 0740          Discharge Summary     Maya Newton Rodriguez  : 2000  MRN: [id number]  CSN: [id number]    Date of Admission: 2021   Date of Discharge:  2021   Delivering Physician: Sherice Chinchilla        Admission Diagnosis: 1. Encounter for elective induction of labor [Z34.90]   Discharge  Diagnosis: 1. Pregnancy at 40w0d - delivered       Procedures: 2021  - Vaginal, Spontaneous       Hospital Course  Patient is a 21 y.o.  who at 40w0d had a vaginal birth.  Her postpartum course was without complications.  On PPD #2 she was ready for discharge.  She had normal lochia and pain well controlled with oral medications.    Infant  female  fetus weighing 3410 g (7 lb 8.3 oz)   Apgars -  8 @ 1 minute /  9 @ 5 minutes.    Discharge labs  Lab Results   Component Value Date    WBC 7.19 2021    HGB 7.9 (L) 2021    HCT 25.7 (L) 2021     2021       Discharge Medications     Discharge Medications      Continue These Medications      Instructions Start Date   prenatal vitamin 27-0.8 27-0.8 MG tablet tablet   1 tablet, Oral, Daily             Discharge Disposition Home or Self Care   Condition on Discharge: good   Follow-up: 6 weeks with Rufina Hirsch MD  2021    Electronically signed by Jeny Hirsch MD at 21 0773

## 2021-04-27 ENCOUNTER — HOSPITAL ENCOUNTER (OUTPATIENT)
Dept: LACTATION | Facility: HOSPITAL | Age: 21
Discharge: HOME | End: 2021-04-27

## 2021-04-27 LAB
C TRACH RRNA SPEC QL NAA+PROBE: ABNORMAL
N GONORRHOEA RRNA SPEC QL NAA+PROBE: NEGATIVE

## 2021-04-27 NOTE — LACTATION NOTE
Mother's Name: Lamar Rodriguez  Contact Number: [phone]  G/P: 2/2  Breastfeeding Hx: attempted breastfeeding 2 weeks with first child and pumped 4 weeks  Significant Medical History:   Maternal Breast Assessment: breast filling, nipples intact with mild tenderness    Infant's Name: Lurdes Rodriguez  Date of Birth: 21  Gestational age at Birth: 39w5d  Age: 3 days  Physician: Dr. Leonard                     Reason for Visit:  [place] follow up          Infant's Birth weight: 7-8.3 (3410g)  Previous Weight:  6-15.7 (3166g)  Wt Loss: -7.15%    Today's Weight:   7-4.1 (3292g)  Wt Loss: -3.4%    Feeding History Since Discharge/Last Lactation Appt.: Nurses every 2.5-3 hours for 10-15 minutes on each breast. Sleeping between feedings.    Past 24 Hours Voids/Stools:   6/2     Color of Stool: brown transitioning    Pre Weight: 7-4.7 (3308g)           Left Breast:    10 minutes sleepy  3324                Right Breast:  10 minutes sleepy  3332                       Total Minutes:    20         Total Weight Gain:    24      gms    Average Feeding Amount for Age: 15-30 ml    Interventions: Observed infant nurse on left breast. Assisted with adjusting latch techniques to allow deeper latch and more comfortable latch for mother's tender nipples. Mother reports improvement of comfort of nipple.     Education: latching and positioning techniques, expected intake, voids/stools, pumping after feeds for comfort to avoid engorgement.     Notified MD/ Orders Received: na    Feeding Plan: continue nursing on demand and at least every 3 hours. Pump for comfort after feedings as needed.     Plan of Care:    Interventions accomplished satisfactorily, requires no further action.    Future Appointments:    Lactation: as needed    Physician: Dr. Leonard tomorrow 21    Signature: Georgette Oneill RN IBCLC    Faxed to: Dr. Leonard  Date: 21

## 2021-04-28 LAB
CYTO UR: NORMAL
LAB AP CASE REPORT: NORMAL
LAB AP CLINICAL INFORMATION: NORMAL
PATH REPORT.FINAL DX SPEC: NORMAL
PATH REPORT.GROSS SPEC: NORMAL

## 2021-06-04 ENCOUNTER — POSTPARTUM VISIT (OUTPATIENT)
Dept: OBSTETRICS AND GYNECOLOGY | Facility: CLINIC | Age: 21
End: 2021-06-04

## 2021-06-04 VITALS
BODY MASS INDEX: 21.47 KG/M2 | HEIGHT: 70 IN | DIASTOLIC BLOOD PRESSURE: 70 MMHG | SYSTOLIC BLOOD PRESSURE: 100 MMHG | WEIGHT: 150 LBS

## 2021-06-04 PROCEDURE — 0503F POSTPARTUM CARE VISIT: CPT | Performed by: OBSTETRICS & GYNECOLOGY

## 2021-06-04 NOTE — PROGRESS NOTES
"Subjective   Lamar Rodriguez is a 21 y.o. female.     Chief Complaint   Patient presents with   • Post-op     pt here today for 6 week PP visit. pt had baby girl named Lurdes. pt is currently bottlefeeding. pt voices no concerns.        21-year-old female  2 para 2 presents for postpartum evaluation. Patient reports overall she is doing well. She is currently bottlefeeding. She reports her mood is well controlled. She is interested in contraceptive management. She voices no other new complaints at this time.       Review of Systems   Constitutional: Negative for chills and fever.   Genitourinary: Negative for vaginal bleeding.       Objective   /70   Ht 177.8 cm (70\")   Wt 68 kg (150 lb)   Breastfeeding No   BMI 21.52 kg/m²   No LMP recorded.  Physical Exam  Vitals and nursing note reviewed. Exam conducted with a chaperone present.   Constitutional:       General: She is not in acute distress.     Appearance: She is well-developed.   HENT:      Head: Normocephalic and atraumatic.   Eyes:      General:         Right eye: No discharge.         Left eye: No discharge.      Conjunctiva/sclera: Conjunctivae normal.   Pulmonary:      Effort: Pulmonary effort is normal.   Musculoskeletal:         General: Normal range of motion.      Cervical back: Normal range of motion and neck supple.   Skin:     General: Skin is warm and dry.   Neurological:      Mental Status: She is alert and oriented to person, place, and time.   Psychiatric:         Behavior: Behavior normal.         Judgment: Judgment normal.       Assessment/Plan   Problems Addressed this Visit     None      Visit Diagnoses     Postpartum state    -  Primary      Diagnoses       Codes Comments    Postpartum state    -  Primary ICD-10-CM: Z39.2  ICD-9-CM: V24.2       Work release given.  Consent signed for Mirena. Discussed with patient risk benefits. Also counseled patient on different birth control management options. All questions answered and " patient verbalized understanding of plan. She will return to the office once her Mirena is here.       Sherice Chinchilla, DO

## 2021-09-12 ENCOUNTER — APPOINTMENT (OUTPATIENT)
Dept: GENERAL RADIOLOGY | Facility: HOSPITAL | Age: 21
End: 2021-09-12

## 2021-09-12 ENCOUNTER — HOSPITAL ENCOUNTER (INPATIENT)
Facility: HOSPITAL | Age: 21
LOS: 4 days | Discharge: HOME | End: 2021-09-16
Attending: FAMILY MEDICINE | Admitting: INTERNAL MEDICINE

## 2021-09-12 ENCOUNTER — APPOINTMENT (OUTPATIENT)
Dept: CT IMAGING | Facility: HOSPITAL | Age: 21
End: 2021-09-12

## 2021-09-12 DIAGNOSIS — J96.01 ACUTE HYPOXEMIC RESPIRATORY FAILURE DUE TO COVID-19 (HCC): Primary | ICD-10-CM

## 2021-09-12 DIAGNOSIS — U07.1 ACUTE HYPOXEMIC RESPIRATORY FAILURE DUE TO COVID-19 (HCC): Primary | ICD-10-CM

## 2021-09-12 PROBLEM — E87.6 HYPOKALEMIA: Status: ACTIVE | Noted: 2021-09-12

## 2021-09-12 PROBLEM — R79.89 POSITIVE D DIMER: Status: ACTIVE | Noted: 2021-09-12

## 2021-09-12 LAB
ALBUMIN SERPL-MCNC: 4.1 G/DL (ref 3.5–5.2)
ALBUMIN/GLOB SERPL: 1.1 G/DL
ALP SERPL-CCNC: 85 U/L (ref 39–117)
ALT SERPL W P-5'-P-CCNC: 18 U/L (ref 1–33)
ANION GAP SERPL CALCULATED.3IONS-SCNC: 14 MMOL/L (ref 5–15)
ARTERIAL PATENCY WRIST A: ABNORMAL
AST SERPL-CCNC: 29 U/L (ref 1–32)
ATMOSPHERIC PRESS: 755 MMHG
B-HCG UR QL: NEGATIVE
BASE EXCESS BLDA CALC-SCNC: -3.6 MMOL/L (ref 0–2)
BASOPHILS # BLD AUTO: 0.02 10*3/MM3 (ref 0–0.2)
BASOPHILS NFR BLD AUTO: 0.2 % (ref 0–1.5)
BDY SITE: ABNORMAL
BILIRUB SERPL-MCNC: 0.4 MG/DL (ref 0–1.2)
BODY TEMPERATURE: 37 C
BUN SERPL-MCNC: 12 MG/DL (ref 6–20)
BUN/CREAT SERPL: 16.4 (ref 7–25)
CALCIUM SPEC-SCNC: 8.8 MG/DL (ref 8.6–10.5)
CHLORIDE SERPL-SCNC: 103 MMOL/L (ref 98–107)
CO2 SERPL-SCNC: 20 MMOL/L (ref 22–29)
CREAT SERPL-MCNC: 0.73 MG/DL (ref 0.57–1)
CRP SERPL-MCNC: 16.16 MG/DL (ref 0–0.5)
D DIMER PPP FEU-MCNC: 1.75 MG/L (FEU) (ref 0–0.5)
D-LACTATE SERPL-SCNC: 1.5 MMOL/L (ref 0.5–2)
DEPRECATED RDW RBC AUTO: 42.7 FL (ref 37–54)
EOSINOPHIL # BLD AUTO: 0.01 10*3/MM3 (ref 0–0.4)
EOSINOPHIL NFR BLD AUTO: 0.1 % (ref 0.3–6.2)
ERYTHROCYTE [DISTWIDTH] IN BLOOD BY AUTOMATED COUNT: 14.5 % (ref 12.3–15.4)
FERRITIN SERPL-MCNC: 48.44 NG/ML (ref 13–150)
GFR SERPL CREATININE-BSD FRML MDRD: 99 ML/MIN/1.73
GLOBULIN UR ELPH-MCNC: 3.8 GM/DL
GLUCOSE SERPL-MCNC: 118 MG/DL (ref 65–99)
HCG SERPL QL: NEGATIVE
HCO3 BLDA-SCNC: 19.6 MMOL/L (ref 20–26)
HCT VFR BLD AUTO: 40.1 % (ref 34–46.6)
HGB BLD-MCNC: 13.4 G/DL (ref 12–15.9)
HOLD SPECIMEN: NORMAL
HOLD SPECIMEN: NORMAL
IMM GRANULOCYTES # BLD AUTO: 0.05 10*3/MM3 (ref 0–0.05)
IMM GRANULOCYTES NFR BLD AUTO: 0.5 % (ref 0–0.5)
LDH SERPL-CCNC: 243 U/L (ref 135–214)
LIPASE SERPL-CCNC: 42 U/L (ref 13–60)
LYMPHOCYTES # BLD AUTO: 1.4 10*3/MM3 (ref 0.7–3.1)
LYMPHOCYTES NFR BLD AUTO: 13.4 % (ref 19.6–45.3)
Lab: ABNORMAL
MCH RBC QN AUTO: 27.5 PG (ref 26.6–33)
MCHC RBC AUTO-ENTMCNC: 33.4 G/DL (ref 31.5–35.7)
MCV RBC AUTO: 82.2 FL (ref 79–97)
MODALITY: ABNORMAL
MONOCYTES # BLD AUTO: 0.33 10*3/MM3 (ref 0.1–0.9)
MONOCYTES NFR BLD AUTO: 3.2 % (ref 5–12)
NEUTROPHILS NFR BLD AUTO: 8.6 10*3/MM3 (ref 1.7–7)
NEUTROPHILS NFR BLD AUTO: 82.6 % (ref 42.7–76)
NRBC BLD AUTO-RTO: 0 /100 WBC (ref 0–0.2)
NT-PROBNP SERPL-MCNC: 210.4 PG/ML (ref 0–450)
PCO2 BLDA: 29.8 MM HG (ref 35–45)
PCO2 TEMP ADJ BLD: 29.8 MM HG (ref 35–45)
PH BLDA: 7.43 PH UNITS (ref 7.35–7.45)
PH, TEMP CORRECTED: 7.43 PH UNITS (ref 7.35–7.45)
PLATELET # BLD AUTO: 157 10*3/MM3 (ref 140–450)
PMV BLD AUTO: 11.7 FL (ref 6–12)
PO2 BLDA: 58.5 MM HG (ref 83–108)
PO2 TEMP ADJ BLD: 58.5 MM HG (ref 83–108)
POTASSIUM SERPL-SCNC: 3.1 MMOL/L (ref 3.5–5.2)
PROCALCITONIN SERPL-MCNC: 0.27 NG/ML (ref 0–0.25)
PROT SERPL-MCNC: 7.9 G/DL (ref 6–8.5)
RBC # BLD AUTO: 4.88 10*6/MM3 (ref 3.77–5.28)
S PNEUM AG SPEC QL LA: NEGATIVE
SAO2 % BLDCOA: 93 % (ref 94–99)
SARS-COV-2 RNA PNL SPEC NAA+PROBE: DETECTED
SODIUM SERPL-SCNC: 137 MMOL/L (ref 136–145)
TROPONIN T SERPL-MCNC: <0.01 NG/ML (ref 0–0.03)
VENTILATOR MODE: ABNORMAL
WBC # BLD AUTO: 10.41 10*3/MM3 (ref 3.4–10.8)
WHOLE BLOOD HOLD SPECIMEN: NORMAL
WHOLE BLOOD HOLD SPECIMEN: NORMAL

## 2021-09-12 PROCEDURE — 81025 URINE PREGNANCY TEST: CPT | Performed by: INTERNAL MEDICINE

## 2021-09-12 PROCEDURE — 94799 UNLISTED PULMONARY SVC/PX: CPT

## 2021-09-12 PROCEDURE — XW033E5 INTRODUCTION OF REMDESIVIR ANTI-INFECTIVE INTO PERIPHERAL VEIN, PERCUTANEOUS APPROACH, NEW TECHNOLOGY GROUP 5: ICD-10-PCS | Performed by: INTERNAL MEDICINE

## 2021-09-12 PROCEDURE — 83605 ASSAY OF LACTIC ACID: CPT | Performed by: NURSE PRACTITIONER

## 2021-09-12 PROCEDURE — 83690 ASSAY OF LIPASE: CPT | Performed by: NURSE PRACTITIONER

## 2021-09-12 PROCEDURE — 83880 ASSAY OF NATRIURETIC PEPTIDE: CPT | Performed by: NURSE PRACTITIONER

## 2021-09-12 PROCEDURE — 85379 FIBRIN DEGRADATION QUANT: CPT | Performed by: NURSE PRACTITIONER

## 2021-09-12 PROCEDURE — 82728 ASSAY OF FERRITIN: CPT | Performed by: NURSE PRACTITIONER

## 2021-09-12 PROCEDURE — 36600 WITHDRAWAL OF ARTERIAL BLOOD: CPT

## 2021-09-12 PROCEDURE — 25010000002 DEXAMETHASONE PER 1 MG: Performed by: NURSE PRACTITIONER

## 2021-09-12 PROCEDURE — 0 IOPAMIDOL PER 1 ML: Performed by: NURSE PRACTITIONER

## 2021-09-12 PROCEDURE — 99285 EMERGENCY DEPT VISIT HI MDM: CPT

## 2021-09-12 PROCEDURE — 84703 CHORIONIC GONADOTROPIN ASSAY: CPT | Performed by: INTERNAL MEDICINE

## 2021-09-12 PROCEDURE — 71275 CT ANGIOGRAPHY CHEST: CPT

## 2021-09-12 PROCEDURE — 85025 COMPLETE CBC W/AUTO DIFF WBC: CPT | Performed by: NURSE PRACTITIONER

## 2021-09-12 PROCEDURE — 80053 COMPREHEN METABOLIC PANEL: CPT | Performed by: NURSE PRACTITIONER

## 2021-09-12 PROCEDURE — 87635 SARS-COV-2 COVID-19 AMP PRB: CPT | Performed by: NURSE PRACTITIONER

## 2021-09-12 PROCEDURE — 84145 PROCALCITONIN (PCT): CPT | Performed by: NURSE PRACTITIONER

## 2021-09-12 PROCEDURE — 93005 ELECTROCARDIOGRAM TRACING: CPT | Performed by: NURSE PRACTITIONER

## 2021-09-12 PROCEDURE — 82803 BLOOD GASES ANY COMBINATION: CPT

## 2021-09-12 PROCEDURE — 25010000002 ONDANSETRON PER 1 MG: Performed by: INTERNAL MEDICINE

## 2021-09-12 PROCEDURE — 86140 C-REACTIVE PROTEIN: CPT | Performed by: NURSE PRACTITIONER

## 2021-09-12 PROCEDURE — 71045 X-RAY EXAM CHEST 1 VIEW: CPT

## 2021-09-12 PROCEDURE — 87040 BLOOD CULTURE FOR BACTERIA: CPT | Performed by: NURSE PRACTITIONER

## 2021-09-12 PROCEDURE — 83615 LACTATE (LD) (LDH) ENZYME: CPT | Performed by: NURSE PRACTITIONER

## 2021-09-12 PROCEDURE — 84484 ASSAY OF TROPONIN QUANT: CPT | Performed by: NURSE PRACTITIONER

## 2021-09-12 PROCEDURE — 93010 ELECTROCARDIOGRAM REPORT: CPT | Performed by: EMERGENCY MEDICINE

## 2021-09-12 PROCEDURE — 87899 AGENT NOS ASSAY W/OPTIC: CPT | Performed by: NURSE PRACTITIONER

## 2021-09-12 PROCEDURE — 25010000002 ENOXAPARIN PER 10 MG: Performed by: INTERNAL MEDICINE

## 2021-09-12 RX ORDER — ZINC SULFATE 50(220)MG
220 CAPSULE ORAL DAILY
Status: DISCONTINUED | OUTPATIENT
Start: 2021-09-12 | End: 2021-09-16 | Stop reason: HOSPADM

## 2021-09-12 RX ORDER — ONDANSETRON 2 MG/ML
4 INJECTION INTRAMUSCULAR; INTRAVENOUS EVERY 6 HOURS PRN
Status: DISCONTINUED | OUTPATIENT
Start: 2021-09-12 | End: 2021-09-16 | Stop reason: HOSPADM

## 2021-09-12 RX ORDER — SODIUM CHLORIDE 0.9 % (FLUSH) 0.9 %
10 SYRINGE (ML) INJECTION AS NEEDED
Status: DISCONTINUED | OUTPATIENT
Start: 2021-09-12 | End: 2021-09-16 | Stop reason: HOSPADM

## 2021-09-12 RX ORDER — POTASSIUM CHLORIDE 750 MG/1
40 CAPSULE, EXTENDED RELEASE ORAL ONCE
Status: COMPLETED | OUTPATIENT
Start: 2021-09-12 | End: 2021-09-12

## 2021-09-12 RX ORDER — DEXAMETHASONE SODIUM PHOSPHATE 10 MG/ML
6 INJECTION INTRAMUSCULAR; INTRAVENOUS ONCE
Status: COMPLETED | OUTPATIENT
Start: 2021-09-12 | End: 2021-09-12

## 2021-09-12 RX ORDER — SODIUM CHLORIDE 0.9 % (FLUSH) 0.9 %
10 SYRINGE (ML) INJECTION EVERY 12 HOURS SCHEDULED
Status: DISCONTINUED | OUTPATIENT
Start: 2021-09-12 | End: 2021-09-16 | Stop reason: HOSPADM

## 2021-09-12 RX ORDER — ASCORBIC ACID 500 MG
500 TABLET ORAL DAILY
Status: DISCONTINUED | OUTPATIENT
Start: 2021-09-12 | End: 2021-09-16 | Stop reason: HOSPADM

## 2021-09-12 RX ORDER — ACETAMINOPHEN 325 MG/1
650 TABLET ORAL EVERY 4 HOURS PRN
Status: DISCONTINUED | OUTPATIENT
Start: 2021-09-12 | End: 2021-09-16 | Stop reason: HOSPADM

## 2021-09-12 RX ORDER — FAMOTIDINE 20 MG/1
20 TABLET, FILM COATED ORAL 2 TIMES DAILY
Status: DISCONTINUED | OUTPATIENT
Start: 2021-09-12 | End: 2021-09-16 | Stop reason: HOSPADM

## 2021-09-12 RX ORDER — SODIUM CHLORIDE 9 MG/ML
75 INJECTION, SOLUTION INTRAVENOUS CONTINUOUS
Status: DISCONTINUED | OUTPATIENT
Start: 2021-09-12 | End: 2021-09-14

## 2021-09-12 RX ORDER — LANOLIN ALCOHOL/MO/W.PET/CERES
3 CREAM (GRAM) TOPICAL NIGHTLY
Status: DISCONTINUED | OUTPATIENT
Start: 2021-09-12 | End: 2021-09-16 | Stop reason: HOSPADM

## 2021-09-12 RX ADMIN — REMDESIVIR 200 MG: 100 INJECTION, POWDER, LYOPHILIZED, FOR SOLUTION INTRAVENOUS at 16:11

## 2021-09-12 RX ADMIN — THIAMINE HCL TAB 100 MG 100 MG: 100 TAB at 16:11

## 2021-09-12 RX ADMIN — SODIUM CHLORIDE, PRESERVATIVE FREE 10 ML: 5 INJECTION INTRAVENOUS at 16:11

## 2021-09-12 RX ADMIN — ZINC SULFATE 220 MG (50 MG) CAPSULE 220 MG: CAPSULE at 16:11

## 2021-09-12 RX ADMIN — POTASSIUM CHLORIDE 40 MEQ: 750 CAPSULE, EXTENDED RELEASE ORAL at 14:12

## 2021-09-12 RX ADMIN — Medication 3 MG: at 22:45

## 2021-09-12 RX ADMIN — IOPAMIDOL 100 ML: 755 INJECTION, SOLUTION INTRAVENOUS at 11:32

## 2021-09-12 RX ADMIN — ENOXAPARIN SODIUM 40 MG: 40 INJECTION SUBCUTANEOUS at 16:11

## 2021-09-12 RX ADMIN — FAMOTIDINE 20 MG: 20 TABLET, FILM COATED ORAL at 22:44

## 2021-09-12 RX ADMIN — OXYCODONE HYDROCHLORIDE AND ACETAMINOPHEN 500 MG: 500 TABLET ORAL at 16:10

## 2021-09-12 RX ADMIN — SODIUM CHLORIDE, POTASSIUM CHLORIDE, SODIUM LACTATE AND CALCIUM CHLORIDE 500 ML: 600; 310; 30; 20 INJECTION, SOLUTION INTRAVENOUS at 10:01

## 2021-09-12 RX ADMIN — SODIUM CHLORIDE, PRESERVATIVE FREE 10 ML: 5 INJECTION INTRAVENOUS at 22:44

## 2021-09-12 RX ADMIN — ONDANSETRON 4 MG: 2 INJECTION INTRAMUSCULAR; INTRAVENOUS at 18:23

## 2021-09-12 RX ADMIN — DEXAMETHASONE SODIUM PHOSPHATE 6 MG: 10 INJECTION INTRAMUSCULAR; INTRAVENOUS at 10:01

## 2021-09-12 RX ADMIN — SODIUM CHLORIDE 75 ML/HR: 9 INJECTION, SOLUTION INTRAVENOUS at 16:11

## 2021-09-12 NOTE — PLAN OF CARE
Goal Outcome Evaluation:  Plan of Care Reviewed With: patient        Progress: no change  Outcome Summary: PATIENT ADMITTED FROM ER TO  WITH HYPOXIA/RESP FAILURE D/T COVID. SATS WNL ON 2 LITERS, FREQUENT CONGESTED COUGH. NO C/O PAIN. NS INFUSING AT 75 ML/HR. REMDESIVIR, DECADRON, LOVENOX AND VITAMINS GIVEN. D DIMER ELEVATED, CTA NEGATIVE FOR PE, VENOUS DOPPLER ORDERED. PATIENT 4 MONTHS POST PARTUM, OTHERWISE NO MEDICAL HX. SINUS PER TELE, VSS. CONT TO MONITOR.

## 2021-09-12 NOTE — H&P
[facility] Medicine Services  HISTORY AND PHYSICAL    Date of Admission: 9/12/2021  Primary Care Physician: Provider, No Known    Subjective     Chief Complaint: Shortness of breath    History of Present Illness  Patient is a 21-year-old female approximately 4 to 5 months postpartum otherwise no medical history who presents today to the ER from urgent care.  She had gone to urgent care for approximately 3 to 4 days of congestion and nonproductive cough.  Today she began to feel more short of breath and like she was having some tightness in her chest.  Denies any actual chest pain just relates it is more of a problem getting a deep breath.  Says she had a fever of up to 101.6 at home last night.  She has not had the best appetite and has not been eating or drinking much lately.  Otherwise no abdominal pain, vomiting, diarrhea.  No focal numbness tingling or weakness.  In the ER on arrival her sat was 85% on room air.  She swab positive for Covid and had mildly elevated D-dimer.  She was sent for CTA which was negative for PE but showed bilateral infiltrates.  She is currently on 2 L and feels much better after oxygen was applied, sat 95%.  She was given Decadron.  We have been asked to admit for further work-up and care.        Review of Systems   Otherwise complete ROS reviewed and negative except as mentioned in the HPI.    Past Medical History:   Past Medical History:   Diagnosis Date   • Chlamydia     prior to pregnancy     Past Surgical History:  Past Surgical History:   Procedure Laterality Date   • MOUTH SURGERY       Social History:  reports that she has never smoked. She has never used smokeless tobacco. She reports that she does not drink alcohol and does not use drugs.    Family History: family history includes Prostate cancer in her maternal grandfather.       Allergies:  No Known Allergies    Medications:  Prior to Admission medications    Medication Sig Start Date  "End Date Taking? Authorizing Provider   Prenatal Vit-Fe Fumarate-FA (prenatal vitamin 27-0.8) 27-0.8 MG tablet tablet Take 1 tablet by mouth Daily.  9/12/21  Provider, MD GENTRY Urbina have utilized all available immediate resources to obtain, update, and review the patient's current medications.    Objective     Vital Signs: /64   Pulse 79   Temp 98.2 °F (36.8 °C) (Oral)   Resp 23   Ht 177.8 cm (70\")   Wt 65.3 kg (144 lb)   LMP 08/22/2021 (Approximate)   SpO2 96%   BMI 20.66 kg/m²   Physical Exam   GEN: Awake, alert, interactive, in NAD, speaking in full sentenses  HEENT: EOMI, Anicteric, Trachea midline  Lungs: equal chest rise b/l, good respiratory effort, no accessory muscle use  Heart: Regular rate and rhythm  ABD: Nondistended, flat  Extremities:  no cyanosis, no edema  Skin: no visible rashes or petechiae  Neuro: AAOx3, no focal deficits  Psych: normal mood & affect        Results Reviewed:  Lab Results (last 24 hours)     Procedure Component Value Units Date/Time    [place] Draw [[id number]] Collected: 09/12/21 0936    Specimen: Blood Updated: 09/12/21 1045    Narrative:      The following orders were created for panel order [place] Draw.  Procedure                               Abnormality         Status                     ---------                               -----------         ------                     Green Top (Gel)[[id number]]                                  Final result               Lavender Top[[id number]]                                     Final result               Red Top[[id number]]                                          Final result               Light Blue Top[[id number]]                                   Final result                 Please view results for these tests on the individual orders.    Green Top (Gel) [[id number]] Collected: 09/12/21 0936    Specimen: Blood Updated: 09/12/21 1045     Extra Tube Hold for add-ons.     Comment: Auto resulted.       Red Top " "[[id number]] Collected: 09/12/21 0936    Specimen: Blood Updated: 09/12/21 1045     Extra Tube Hold for add-ons.     Comment: Auto resulted.       Lavender Top [[id number]] Collected: 09/12/21 0936    Specimen: Blood Updated: 09/12/21 1045     Extra Tube hold for add-on     Comment: Auto resulted       Light Blue Top [[id number]] Collected: 09/12/21 0936    Specimen: Blood Updated: 09/12/21 1045     Extra Tube hold for add-on     Comment: Auto resulted       Procalcitonin [[id number]]  (Abnormal) Collected: 09/12/21 0936    Specimen: Blood Updated: 09/12/21 1033     Procalcitonin 0.27 ng/mL     Narrative:      As a Marker for Sepsis (Non-Neonates):     1. <0.5 ng/mL represents a low risk of severe sepsis and/or septic shock.  2. >2 ng/mL represents a high risk of severe sepsis and/or septic shock.    As a Marker for Lower Respiratory Tract Infections that require antibiotic therapy:  PCT on Admission     Antibiotic Therapy             6-12 Hrs later  >0.5                          Strongly Recommended            >0.25 - <0.5             Recommended  0.1 - 0.25                  Discouraged                       Remeasure/reassess PCT  <0.1                         Strongly Discouraged         Remeasure/reassess PCT      As 28 day mortality risk marker: \"Change in Procalcitonin Result\" (>80% or <=80%) if Day 0 (or Day 1) and Day 4 values are available. Refer to http://www.[employer]-pct-calculator.com/    Change in PCT <=80 %   A decrease of PCT levels below or equal to 80% defines a positive change in PCT test result representing a higher risk for 28-day all-cause mortality of patients diagnosed with severe sepsis or septic shock.    Change in PCT >80 %   A decrease of PCT levels of more than 80% defines a negative change in PCT result representing a lower risk for 28-day all-cause mortality of patients diagnosed with severe sepsis or septic shock.                Ferritin [[id number]]  (Normal) Collected: 09/12/21 0936    " Specimen: Blood Updated: 09/12/21 1033     Ferritin 48.44 ng/mL     Narrative:      Results may be falsely decreased if patient taking Biotin.      C-reactive Protein [[id number]]  (Abnormal) Collected: 09/12/21 0936    Specimen: Blood Updated: 09/12/21 1029     C-Reactive Protein 16.16 mg/dL     Comprehensive Metabolic Panel [[id number]]  (Abnormal) Collected: 09/12/21 0936    Specimen: Blood Updated: 09/12/21 1029     Glucose 118 mg/dL      BUN 12 mg/dL      Creatinine 0.73 mg/dL      Sodium 137 mmol/L      Potassium 3.1 mmol/L      Chloride 103 mmol/L      CO2 20.0 mmol/L      Calcium 8.8 mg/dL      Total Protein 7.9 g/dL      Albumin 4.10 g/dL      ALT (SGPT) 18 U/L      AST (SGOT) 29 U/L      Alkaline Phosphatase 85 U/L      Total Bilirubin 0.4 mg/dL      eGFR Non African Amer 99 mL/min/1.73      Globulin 3.8 gm/dL      A/G Ratio 1.1 g/dL      BUN/Creatinine Ratio 16.4     Anion Gap 14.0 mmol/L     Narrative:      GFR Normal >60  Chronic Kidney Disease <60  Kidney Failure <15      Lactate Dehydrogenase [[id number]]  (Abnormal) Collected: 09/12/21 0936    Specimen: Blood Updated: 09/12/21 1028      U/L     Troponin [[id number]]  (Normal) Collected: 09/12/21 0936    Specimen: Blood Updated: 09/12/21 1027     Troponin T <0.010 ng/mL     Narrative:      Troponin T Reference Range:  <= 0.03 ng/mL-   Negative for AMI  >0.03 ng/mL-     Abnormal for myocardial necrosis.  Clinicians would have to utilize clinical acumen, EKG, Troponin and serial changes to determine if it is an Acute Myocardial Infarction or myocardial injury due to an underlying chronic condition.       Results may be falsely decreased if patient taking Biotin.      BNP [[id number]]  (Normal) Collected: 09/12/21 0936    Specimen: Blood Updated: 09/12/21 1026     proBNP 210.4 pg/mL     Narrative:      Among patients with dyspnea, NT-proBNP is highly sensitive for the detection of acute congestive heart failure. In addition NT-proBNP of <300 pg/ml  effectively rules out acute congestive heart failure with 99% negative predictive value.    Results may be falsely decreased if patient taking Biotin.      COVID-19,[facility],Nasal Swab No Transport Media 3-4 HR TAT - Swab, Nasal Cavity [[id number]]  (Abnormal) Collected: 09/12/21 0939    Specimen: Swab from Nasal Cavity Updated: 09/12/21 1025     COVID19 Detected    Narrative:      Fact sheet for providers: https://www.fda.gov/media/397253/download     Fact sheet for patients: https://www.fda.gov/media/847223/download    Test performed by PCR.    Consider negative results in combination with clinical observations, patient history, and epidemiological information.    Lipase [[id number]]  (Normal) Collected: 09/12/21 0936    Specimen: Blood Updated: 09/12/21 1024     Lipase 42 U/L     Lactic Acid, Plasma [[id number]]  (Normal) Collected: 09/12/21 0936    Specimen: Blood Updated: 09/12/21 1011     Lactate 1.5 mmol/L     D-dimer, Quantitative [[id number]]  (Abnormal) Collected: 09/12/21 0936    Specimen: Blood Updated: 09/12/21 1006     D-Dimer, Quantitative 1.75 mg/L (FEU)     Narrative:      Reference Range is 0-0.50 mg/L FEU. However, results <0.50 mg/L FEU tends to rule out DVT or PE. Results >0.50 mg/L FEU are not useful in predicting absence or presence of DVT or PE.      CBC & Differential [[id number]]  (Abnormal) Collected: 09/12/21 0936    Specimen: Blood Updated: 09/12/21 1003    Narrative:      The following orders were created for panel order CBC & Differential.  Procedure                               Abnormality         Status                     ---------                               -----------         ------                     CBC Auto Differential[[id number]]        Abnormal            Final result                 Please view results for these tests on the individual orders.    CBC Auto Differential [[id number]]  (Abnormal) Collected: 09/12/21 0936    Specimen: Blood Updated: 09/12/21 1003     WBC  10.41 10*3/mm3      RBC 4.88 10*6/mm3      Hemoglobin 13.4 g/dL      Hematocrit 40.1 %      MCV 82.2 fL      MCH 27.5 pg      MCHC 33.4 g/dL      RDW 14.5 %      RDW-SD 42.7 fl      MPV 11.7 fL      Platelets 157 10*3/mm3      Neutrophil % 82.6 %      Lymphocyte % 13.4 %      Monocyte % 3.2 %      Eosinophil % 0.1 %      Basophil % 0.2 %      Immature Grans % 0.5 %      Neutrophils, Absolute 8.60 10*3/mm3      Lymphocytes, Absolute 1.40 10*3/mm3      Monocytes, Absolute 0.33 10*3/mm3      Eosinophils, Absolute 0.01 10*3/mm3      Basophils, Absolute 0.02 10*3/mm3      Immature Grans, Absolute 0.05 10*3/mm3      nRBC 0.0 /100 WBC     Blood Culture - Blood, Arm, Left [[id number]] Collected: 09/12/21 0936    Specimen: Blood from Arm, Left Updated: 09/12/21 0955    Blood Culture - Blood, Arm, Left [[id number]] Collected: 09/12/21 0937    Specimen: Blood from Arm, Left Updated: 09/12/21 0955    Blood Gas, Arterial - [[id number]]  (Abnormal) Collected: 09/12/21 0942    Specimen: Arterial Blood Updated: 09/12/21 0940     Site Right Brachial     Agusto's Test N/A     pH, Arterial 7.427 pH units      pCO2, Arterial 29.8 mm Hg      Comment: 84 Value below reference range        pO2, Arterial 58.5 mm Hg      Comment: 84 Value below reference range        HCO3, Arterial 19.6 mmol/L      Comment: 84 Value below reference range        Base Excess, Arterial -3.6 mmol/L      Comment: 84 Value below reference range        O2 Saturation, Arterial 93.0 %      Comment: 84 Value below reference range        Temperature 37.0 C      Barometric Pressure for Blood Gas 755 mmHg      Modality Room Air     Ventilator Mode NA     Collected by [id number]     Comment: Meter: [id number]     :  [id number]        pCO2, Temperature Corrected 29.8 mm Hg      pH, Temp Corrected 7.427 pH Units      pO2, Temperature Corrected 58.5 mm Hg         Imaging Results (Last 24 Hours)     Procedure Component Value Units Date/Time    CT Angiogram Chest  [[id number]] Collected: 09/12/21 1146     Updated: 09/12/21 1153    Narrative:      CT ANGIOGRAM CHEST- 9/12/2021 11:25 AM CDT      HISTORY: O2 sat 85%, recent pregnancy, evaluate for PE      COMPARISON: None.      DOSE LENGTH PRODUCT: 242 mGy cm. Automated exposure control was also  utilized to decrease patient radiation dose.     TECHNIQUE: Helical tomographic images of the chest were obtained after  the administration of intravenous contrast following angiogram protocol.  Additionally, 3D and multiplanar reformatted images were provided.        FINDINGS:    Pulmonary arteries: There is adequate enhancement of the pulmonary  arteries to evaluate for central and segmental pulmonary emboli. There  are no filling defects within the main, lobar, segmental or visualized  subsegmental pulmonary arteries. The pulmonary arteries are within  normal limits for size.      Aorta and great vessels: The aorta is well opacified and demonstrates no  dissection or aneurysm. The great vessels are normal in appearance.     Visualized neck base: The imaged portion of the base of the neck appears  unremarkable.      Lungs: Patchy nodular infiltrates are present in the right upper lobe  and right lower lobe. There is consolidation in the medial segment right  middle lobe. This may be pneumonia.. The trachea and bronchial tree are  patent.      Heart: The heart is normal in size. There is no pericardial effusion.      Mediastinum and lymph nodes: No enlarged mediastinal, hilar, or axillary  lymph nodes are present.      Skeletal and soft tissues: The osseous structures of the thorax and  surrounding soft tissues demonstrate no acute process.     Upper abdomen: The imaged portion of the upper abdomen demonstrates no  acute process.        Impression:      1. There is no evidence of embolic disease.        2. Patchy nodular infiltrates are present. Most likely this is  pneumonia. Another differential consideration would be septic  emboli..        This report was finalized on 09/12/2021 11:50 by Dr. Joselo Wright MD.    XR Chest AP [[id number]] Collected: 09/12/21 1020     Updated: 09/12/21 1023    Narrative:      EXAM: XR CHEST AP- 9/12/2021 9:44 AM CDT     HISTORY: COVID Evaluation, Cough, Fever       COMPARISON: None.     TECHNIQUE: Frontal radiograph of the chest     FINDINGS:   The lungs are clear. The cardiomediastinal silhouette and pulmonary  vascularity are within normal limits.      The osseous structures and surrounding soft tissues demonstrate no acute  abnormality.          Impression:      1. No radiographic evidence of acute cardiopulmonary process.        This report was finalized on 09/12/2021 10:20 by Dr. Joselo Wright MD.        I have personally reviewed and interpreted the radiology studies and ECG obtained at time of admission.     Assessment / Plan     Assessment:   Active Hospital Problems    Diagnosis    • **Acute hypoxemic respiratory failure due to COVID-19 (CMS/[facility])    • COVID-19 virus infection    • Hypokalemia    • Positive D dimer      Plan:   #1 COVID-19 -patient states she first had some runny nose/nasal congestion on Thursday 9/9.  Now presents and on Sunday 9/12 hypoxic with infiltrate positive for Covid.  Suspect she likely had disease prior to initial symptoms and did not realize it due to her otherwise young age and healthy condition.  Reported fever at home but none yet here.  White count normal.  Pro-Roberto mildly elevated.  Will monitor off antibiotics for now.  Continue Decadron daily.  We will start remdesivir.  Other supportive meds to include vitamin C, thiamine, zinc, melatonin.    #2 acute respiratory failure with hypoxia -in the setting of #1 above.  Patient did have a mildly elevated D-dimer which is not uncommon in the setting of COVID-19.  Her CTA was negative for PE.  There are bilateral infiltrates and secondary infection would remain in differential but less likely given duration of illness.   Monitor for antibiotic needs.  Supportive care.  Sepsis from 2.    #3 hypokalemia -replace and recheck.  Check magnesium level    #4 elevated D-dimer -as above CTA negative.  Likely in the setting of her Covid viral illness.  Will be on Lovenox DVT prophylaxis.    Code Status/Advanced Care Plan: Full Code    The patient's surrogate decision maker is her mother Renee.     I discussed my findings and recommendations with the patient direclty at bedside and her mother as requested.    Estimated length of stay is 2+ days.     The patient was seen and examined by me on 9/12/21 at 12:45 PM.    Electronically signed by Sylvain Haley DO, 09/12/21, 13:12 CDT.

## 2021-09-12 NOTE — ED PROVIDER NOTES
Subjective   Patient is a pleasant 21-year-old female who presents the ER today with complaint of chest pain and shortness of breath.  The patient reports that her symptoms began approximately 2 days ago.  She states that she has not felt well and had a temperature up to 101.6 last evening.  She reports nonproductive cough.  She states that she has a heavy feeling and pressure in her mid chest.  The patient is not vaccinated for COVID-19.  She does work in the public and works at [employer].  She denies any known exposures.  The patient initially was seen in urgent care and had an O2 saturation of 88% on room air.  She denies any history of asthma, or COPD.  She does not smoke.  She presents the ER today for further evaluation.  Her initial O2 saturation here was 85% on room air.      History provided by:  Patient   used: No    Shortness of Breath  Severity:  Moderate  Onset quality:  Sudden  Duration:  2 days  Timing:  Constant  Progression:  Worsening  Chronicity:  New  Context: not activity, not animal exposure, not emotional upset, not fumes, not known allergens, not occupational exposure, not pollens, not smoke exposure, not strong odors, not URI and not weather changes    Relieved by:  Nothing  Worsened by:  Nothing  Ineffective treatments:  None tried  Associated symptoms: chest pain, cough and fever    Associated symptoms: no abdominal pain, no claudication, no diaphoresis, no ear pain, no headaches, no hemoptysis, no neck pain, no PND, no rash, no sore throat, no sputum production, no syncope, no swollen glands, no vomiting and no wheezing    Risk factors: no recent alcohol use, no family hx of DVT, no hx of cancer, no hx of PE/DVT, no obesity, no oral contraceptive use, no prolonged immobilization, no recent surgery and no tobacco use        Review of Systems   Constitutional: Positive for fever. Negative for diaphoresis.   HENT: Negative for ear pain and sore throat.    Respiratory:  Positive for cough and shortness of breath. Negative for hemoptysis, sputum production and wheezing.    Cardiovascular: Positive for chest pain. Negative for claudication, syncope and PND.   Gastrointestinal: Negative for abdominal pain and vomiting.   Musculoskeletal: Negative for neck pain.   Skin: Negative for rash.   Neurological: Negative for headaches.   All other systems reviewed and are negative.      Past Medical History:   Diagnosis Date   • Chlamydia     prior to pregnancy       No Known Allergies    Past Surgical History:   Procedure Laterality Date   • MOUTH SURGERY         Family History   Problem Relation Age of Onset   • Prostate cancer Maternal Grandfather        Social History     Socioeconomic History   • Marital status: Single     Spouse name: Not on file   • Number of children: Not on file   • Years of education: Not on file   • Highest education level: Not on file   Tobacco Use   • Smoking status: Never Smoker   • Smokeless tobacco: Never Used   Substance and Sexual Activity   • Alcohol use: No   • Drug use: No   • Sexual activity: Yes     Partners: Male           Objective   Physical Exam  Vitals and nursing note reviewed.   Constitutional:       Appearance: She is well-developed.   HENT:      Head: Normocephalic and atraumatic.      Mouth/Throat:      Pharynx: Oropharynx is clear.   Eyes:      Conjunctiva/sclera: Conjunctivae normal.   Cardiovascular:      Rate and Rhythm: Normal rate and regular rhythm.   Pulmonary:      Effort: Tachypnea present.      Breath sounds: Examination of the right-upper field reveals decreased breath sounds. Examination of the left-upper field reveals decreased breath sounds. Examination of the right-middle field reveals decreased breath sounds. Examination of the left-middle field reveals decreased breath sounds. Examination of the right-lower field reveals decreased breath sounds. Examination of the left-lower field reveals decreased breath sounds. Decreased  breath sounds present.   Abdominal:      General: Bowel sounds are normal.      Palpations: Abdomen is soft.   Skin:     General: Skin is warm and dry.      Capillary Refill: Capillary refill takes less than 2 seconds.   Neurological:      General: No focal deficit present.      Mental Status: She is alert.   Psychiatric:         Mood and Affect: Mood normal.         Procedures           ED Course  ED Course as of Sep 12 1227   Sun Sep 12, 2021   1209 The patient was Covid positive.  Her PO2 was 58.  Initial O2 saturation was 85% on room air.  She is on 2 L of O2 via nasal cannula and satting 97% at this time.  I discussed the patient's case with the hospitalist who will admit the patient at this time.  Patient be admitted in stable condition.  Patient is aware of all findings and agreeable plan of care.    [LF]      ED Course User Index  [LF] Maritza Owen, APRN                                   CT Angiogram Chest   Final Result   1. There is no evidence of embolic disease.           2. Patchy nodular infiltrates are present. Most likely this is   pneumonia. Another differential consideration would be septic emboli..           This report was finalized on 09/12/2021 11:50 by Dr. Joselo Wright MD.      XR Chest AP   Final Result   1. No radiographic evidence of acute cardiopulmonary process.           This report was finalized on 09/12/2021 10:20 by Dr. Joselo Wright MD.        Labs Reviewed   COVID-19,[facility] IN-HOUSE,[facility] NO TRANSPORT MEDIA 2 HR TAT - Abnormal; Notable for the following components:       Result Value    COVID19 Detected (*)     All other components within normal limits    Narrative:     Fact sheet for providers: https://www.fda.gov/media/295443/download     Fact sheet for patients: https://www.fda.gov/media/489048/download    Test performed by PCR.    Consider negative results in combination with clinical observations, patient history, and epidemiological information.   COMPREHENSIVE  "METABOLIC PANEL - Abnormal; Notable for the following components:    Glucose 118 (*)     Potassium 3.1 (*)     CO2 20.0 (*)     All other components within normal limits    Narrative:     GFR Normal >60  Chronic Kidney Disease <60  Kidney Failure <15     CBC WITH AUTO DIFFERENTIAL - Abnormal; Notable for the following components:    Neutrophil % 82.6 (*)     Lymphocyte % 13.4 (*)     Monocyte % 3.2 (*)     Eosinophil % 0.1 (*)     Neutrophils, Absolute 8.60 (*)     All other components within normal limits   LACTATE DEHYDROGENASE - Abnormal; Notable for the following components:     (*)     All other components within normal limits   PROCALCITONIN - Abnormal; Notable for the following components:    Procalcitonin 0.27 (*)     All other components within normal limits    Narrative:     As a Marker for Sepsis (Non-Neonates):     1. <0.5 ng/mL represents a low risk of severe sepsis and/or septic shock.  2. >2 ng/mL represents a high risk of severe sepsis and/or septic shock.    As a Marker for Lower Respiratory Tract Infections that require antibiotic therapy:  PCT on Admission     Antibiotic Therapy             6-12 Hrs later  >0.5                          Strongly Recommended            >0.25 - <0.5             Recommended  0.1 - 0.25                  Discouraged                       Remeasure/reassess PCT  <0.1                         Strongly Discouraged         Remeasure/reassess PCT      As 28 day mortality risk marker: \"Change in Procalcitonin Result\" (>80% or <=80%) if Day 0 (or Day 1) and Day 4 values are available. Refer to [email or web address]    Change in PCT <=80 %   A decrease of PCT levels below or equal to 80% defines a positive change in PCT test result representing a higher risk for 28-day all-cause mortality of patients diagnosed with severe sepsis or septic shock.    Change in PCT >80 %   A decrease of PCT levels of more than 80% defines a negative change in PCT result " representing a lower risk for 28-day all-cause mortality of patients diagnosed with severe sepsis or septic shock.               D-DIMER, QUANTITATIVE - Abnormal; Notable for the following components:    D-Dimer, Quantitative 1.75 (*)     All other components within normal limits    Narrative:     Reference Range is 0-0.50 mg/L FEU. However, results <0.50 mg/L FEU tends to rule out DVT or PE. Results >0.50 mg/L FEU are not useful in predicting absence or presence of DVT or PE.     C-REACTIVE PROTEIN - Abnormal; Notable for the following components:    C-Reactive Protein 16.16 (*)     All other components within normal limits   BLOOD GAS, ARTERIAL - Abnormal; Notable for the following components:    pCO2, Arterial 29.8 (*)     pO2, Arterial 58.5 (*)     HCO3, Arterial 19.6 (*)     Base Excess, Arterial -3.6 (*)     O2 Saturation, Arterial 93.0 (*)     pCO2, Temperature Corrected 29.8 (*)     pO2, Temperature Corrected 58.5 (*)     All other components within normal limits   LACTIC ACID, PLASMA - Normal   FERRITIN - Normal    Narrative:     Results may be falsely decreased if patient taking Biotin.     LIPASE - Normal   TROPONIN (IN-HOUSE) - Normal    Narrative:     Troponin T Reference Range:  <= 0.03 ng/mL-   Negative for AMI  >0.03 ng/mL-     Abnormal for myocardial necrosis.  Clinicians would have to utilize clinical acumen, EKG, Troponin and serial changes to determine if it is an Acute Myocardial Infarction or myocardial injury due to an underlying chronic condition.       Results may be falsely decreased if patient taking Biotin.     BNP (IN-HOUSE) - Normal    Narrative:     Among patients with dyspnea, NT-proBNP is highly sensitive for the detection of acute congestive heart failure. In addition NT-proBNP of <300 pg/ml effectively rules out acute congestive heart failure with 99% negative predictive value.    Results may be falsely decreased if patient taking Biotin.     BLOOD CULTURE   BLOOD CULTURE   STREP  PNEUMO AG, URINE OR CSF   RAINBOW DRAW    Narrative:     The following orders were created for panel order [facility] Draw.  Procedure                               Abnormality         Status                     ---------                               -----------         ------                     Green Top (Gel)[[id number]]                                  Final result               Lavender Top[[id number]]                                     Final result               Red Top[[id number]]                                          Final result               Light Blue Top[[id number]]                                   Final result                 Please view results for these tests on the individual orders.   BLOOD GAS, ARTERIAL   POCT PEFORM URINE PREGNANCY   CBC AND DIFFERENTIAL    Narrative:     The following orders were created for panel order CBC & Differential.  Procedure                               Abnormality         Status                     ---------                               -----------         ------                     CBC Auto Differential[[id number]]        Abnormal            Final result                 Please view results for these tests on the individual orders.   GREEN TOP   LAVENDER TOP   RED TOP   LIGHT BLUE TOP             MDM  Number of Diagnoses or Management Options  Acute hypoxemic respiratory failure due to COVID-19 (CMS/HCC): new and requires workup     Amount and/or Complexity of Data Reviewed  Clinical lab tests: ordered and reviewed  Tests in the radiology section of CPT®: reviewed and ordered  Tests in the medicine section of CPT®: ordered and reviewed  Discuss the patient with other providers: yes    Patient Progress  Patient progress: stable      Final diagnoses:   Acute hypoxemic respiratory failure due to COVID-19 (CMS/HCC)       ED Disposition  ED Disposition     ED Disposition Condition Comment    Decision to Admit  Level of Care: Telemetry [5]   Diagnosis: Acute hypoxemic  respiratory failure due to COVID-19 (CMS/[facility]) [[id number]]   Admitting Physician: BLANQUITA FERGUSON [[id number]]   Attending Physician: BLANQUITA FERGUSON [[id number]]   Isolate for COVID?: Yes [1]   Certification: I Certify That Inpatient Hospital Services Are Medically Necessary For Greater Than 2 Midnights            No follow-up provider specified.       Medication List      No changes were made to your prescriptions during this visit.          Maritza Owen, APRN  09/12/21 1227

## 2021-09-13 ENCOUNTER — APPOINTMENT (OUTPATIENT)
Dept: ULTRASOUND IMAGING | Facility: HOSPITAL | Age: 21
End: 2021-09-13

## 2021-09-13 LAB
ALBUMIN SERPL-MCNC: 3.5 G/DL (ref 3.5–5.2)
ALBUMIN/GLOB SERPL: 1.1 G/DL
ALP SERPL-CCNC: 70 U/L (ref 39–117)
ALT SERPL W P-5'-P-CCNC: 12 U/L (ref 1–33)
ANION GAP SERPL CALCULATED.3IONS-SCNC: 10 MMOL/L (ref 5–15)
ANISOCYTOSIS BLD QL: ABNORMAL
AST SERPL-CCNC: 22 U/L (ref 1–32)
BILIRUB CONJ SERPL-MCNC: <0.2 MG/DL (ref 0–0.3)
BILIRUB SERPL-MCNC: 0.3 MG/DL (ref 0–1.2)
BUN SERPL-MCNC: 13 MG/DL (ref 6–20)
BUN/CREAT SERPL: 21.7 (ref 7–25)
CALCIUM SPEC-SCNC: 8.4 MG/DL (ref 8.6–10.5)
CHLORIDE SERPL-SCNC: 111 MMOL/L (ref 98–107)
CO2 SERPL-SCNC: 20 MMOL/L (ref 22–29)
CREAT SERPL-MCNC: 0.6 MG/DL (ref 0.57–1)
DEPRECATED RDW RBC AUTO: 44 FL (ref 37–54)
ERYTHROCYTE [DISTWIDTH] IN BLOOD BY AUTOMATED COUNT: 14.6 % (ref 12.3–15.4)
GFR SERPL CREATININE-BSD FRML MDRD: 124 ML/MIN/1.73
GLOBULIN UR ELPH-MCNC: 3.1 GM/DL
GLUCOSE SERPL-MCNC: 107 MG/DL (ref 65–99)
HCT VFR BLD AUTO: 34.1 % (ref 34–46.6)
HGB BLD-MCNC: 11.2 G/DL (ref 12–15.9)
LYMPHOCYTES # BLD MANUAL: 0.63 10*3/MM3 (ref 0.7–3.1)
LYMPHOCYTES NFR BLD MANUAL: 1 % (ref 5–12)
LYMPHOCYTES NFR BLD MANUAL: 8 % (ref 19.6–45.3)
MAGNESIUM SERPL-MCNC: 1.9 MG/DL (ref 1.6–2.6)
MCH RBC QN AUTO: 26.9 PG (ref 26.6–33)
MCHC RBC AUTO-ENTMCNC: 32.8 G/DL (ref 31.5–35.7)
MCV RBC AUTO: 82 FL (ref 79–97)
MICROCYTES BLD QL: ABNORMAL
MONOCYTES # BLD AUTO: 0.07 10*3/MM3 (ref 0.1–0.9)
NEUTROPHILS # BLD AUTO: 6.35 10*3/MM3 (ref 1.7–7)
NEUTROPHILS NFR BLD MANUAL: 90 % (ref 42.7–76)
PLAT MORPH BLD: NORMAL
PLATELET # BLD AUTO: 160 10*3/MM3 (ref 140–450)
PMV BLD AUTO: 11.9 FL (ref 6–12)
POTASSIUM SERPL-SCNC: 3.6 MMOL/L (ref 3.5–5.2)
PROT SERPL-MCNC: 6.6 G/DL (ref 6–8.5)
RBC # BLD AUTO: 4.16 10*6/MM3 (ref 3.77–5.28)
SODIUM SERPL-SCNC: 141 MMOL/L (ref 136–145)
VARIANT LYMPHS NFR BLD MANUAL: 1 % (ref 0–5)
WBC # BLD AUTO: 7.05 10*3/MM3 (ref 3.4–10.8)
WBC MORPH BLD: NORMAL

## 2021-09-13 PROCEDURE — 82248 BILIRUBIN DIRECT: CPT | Performed by: INTERNAL MEDICINE

## 2021-09-13 PROCEDURE — 80053 COMPREHEN METABOLIC PANEL: CPT | Performed by: INTERNAL MEDICINE

## 2021-09-13 PROCEDURE — 94799 UNLISTED PULMONARY SVC/PX: CPT

## 2021-09-13 PROCEDURE — 85007 BL SMEAR W/DIFF WBC COUNT: CPT | Performed by: INTERNAL MEDICINE

## 2021-09-13 PROCEDURE — 85025 COMPLETE CBC W/AUTO DIFF WBC: CPT | Performed by: INTERNAL MEDICINE

## 2021-09-13 PROCEDURE — 83735 ASSAY OF MAGNESIUM: CPT | Performed by: INTERNAL MEDICINE

## 2021-09-13 PROCEDURE — 25010000002 ENOXAPARIN PER 10 MG: Performed by: INTERNAL MEDICINE

## 2021-09-13 PROCEDURE — 93970 EXTREMITY STUDY: CPT

## 2021-09-13 PROCEDURE — 36415 COLL VENOUS BLD VENIPUNCTURE: CPT | Performed by: INTERNAL MEDICINE

## 2021-09-13 PROCEDURE — 93970 EXTREMITY STUDY: CPT | Performed by: SURGERY

## 2021-09-13 PROCEDURE — 63710000001 DEXAMETHASONE PER 0.25 MG: Performed by: INTERNAL MEDICINE

## 2021-09-13 RX ORDER — GUAIFENESIN 600 MG/1
1200 TABLET, EXTENDED RELEASE ORAL EVERY 12 HOURS SCHEDULED
Status: DISCONTINUED | OUTPATIENT
Start: 2021-09-13 | End: 2021-09-16 | Stop reason: HOSPADM

## 2021-09-13 RX ADMIN — ENOXAPARIN SODIUM 40 MG: 40 INJECTION SUBCUTANEOUS at 08:43

## 2021-09-13 RX ADMIN — ACETAMINOPHEN 650 MG: 325 TABLET, FILM COATED ORAL at 01:43

## 2021-09-13 RX ADMIN — SODIUM CHLORIDE, PRESERVATIVE FREE 10 ML: 5 INJECTION INTRAVENOUS at 21:09

## 2021-09-13 RX ADMIN — Medication 3 MG: at 21:09

## 2021-09-13 RX ADMIN — SODIUM CHLORIDE 75 ML/HR: 9 INJECTION, SOLUTION INTRAVENOUS at 08:45

## 2021-09-13 RX ADMIN — OXYCODONE HYDROCHLORIDE AND ACETAMINOPHEN 500 MG: 500 TABLET ORAL at 08:42

## 2021-09-13 RX ADMIN — FAMOTIDINE 20 MG: 20 TABLET, FILM COATED ORAL at 21:09

## 2021-09-13 RX ADMIN — SODIUM CHLORIDE, PRESERVATIVE FREE 10 ML: 5 INJECTION INTRAVENOUS at 08:42

## 2021-09-13 RX ADMIN — FAMOTIDINE 20 MG: 20 TABLET, FILM COATED ORAL at 08:42

## 2021-09-13 RX ADMIN — REMDESIVIR 100 MG: 100 INJECTION, POWDER, LYOPHILIZED, FOR SOLUTION INTRAVENOUS at 21:09

## 2021-09-13 RX ADMIN — ACETAMINOPHEN 650 MG: 325 TABLET, FILM COATED ORAL at 21:09

## 2021-09-13 RX ADMIN — GUAIFENESIN 1200 MG: 600 TABLET, EXTENDED RELEASE ORAL at 21:08

## 2021-09-13 RX ADMIN — SODIUM CHLORIDE 75 ML/HR: 9 INJECTION, SOLUTION INTRAVENOUS at 23:52

## 2021-09-13 RX ADMIN — THIAMINE HCL TAB 100 MG 100 MG: 100 TAB at 08:42

## 2021-09-13 RX ADMIN — ZINC SULFATE 220 MG (50 MG) CAPSULE 220 MG: CAPSULE at 08:42

## 2021-09-13 RX ADMIN — DEXAMETHASONE 6 MG: 4 TABLET ORAL at 08:42

## 2021-09-13 RX ADMIN — ACETAMINOPHEN 650 MG: 325 TABLET, FILM COATED ORAL at 08:42

## 2021-09-13 NOTE — PAYOR COMM NOTE
"9/13/21 [facility] [phone]  [phone]    PATIENT ER ADMISSION TO INPATIENT ON 9/12/21.  FAXING CLINICAL FOR INPATIENT REVIEW.    PENDING AUTH [facility]-  [id number]          Lamar Montiel (21 y.o. Female)     Date of Birth Social Security Number Address Home Phone MRN    2000  [address] [place] [address] [phone] [id number]    Zoroastrian Marital Status          Anabaptism Single       Admission Date Admission Type Admitting Provider Attending Provider Department, Room/Bed    9/12/21 Emergency Nghia Flowers MD [place], Glenn Riego, MD [address], 442/1    Discharge Date Discharge Disposition Discharge Destination                       Attending Provider: Nghia Flowers MD    Allergies: No Known Allergies    Isolation: Enh Drop/Con   Infection: COVID (confirmed) (09/12/21)   Code Status: CPR    Ht: 177.8 cm (70\")   Wt: 68.3 kg (150 lb 8 oz)    Admission Cmt: None   Principal Problem: Acute hypoxemic respiratory failure due to COVID-19 (CMS/[facility]) [U07.1,J96.01]                 Active Insurance as of 9/12/2021     Primary Coverage     Payor Plan Insurance Group Employer/Plan Group    AETNA COMMERCIAL [facility] - ASA [id number]     Payor Plan Address Payor Plan Phone Number Payor Plan Fax Number Effective Dates    [address]   1/1/2016 - None Entered    [facility] [address]       Subscriber Name Subscriber Birth Date Member ID       RENEE MONTIEL 4/18/1972 [id number]                 Emergency Contacts      (Rel.) Home Phone Work Phone Mobile Phone    Renee Montiel (Mother) [phone] -- --        [facility] Encounter Date/Time: 9/12/2021 0925   Hospital Account: [id number]    MRN: [id number]   Patient:  Lamar Montiel   Contact Serial #: [id number]   SSN:          ENCOUNTER             Patient Class: Inpatient   Unit: [address] Service: Medicine     Bed: 442/1   Admitting Provider: Nghia Flowers" Amy*   Referring Physician:     Attending Provider: Nghia Flowers*   Adm Diagnosis: Acute hypoxemic respirat*               PATIENT          Name: Lamar Montiel : 2000 (21 yrs)   Address: [address] Sex: Female   City: [place] [address]   County: [facility]   Marital Status: SINGLE Ethnicity: NOT                                                                              Race: WHITE   Primary Care Provider: Provider, No Known Patients Phone: Home Phone: [phone]     Mobile Phone: [phone]   EMERGENCY CONTACT   Contact Name Legal Guardian? Relationship to Patient Home Phone Work Phone   1. Renee Montiel  2. *No Contact Specified* No    Mother    [phone]              GUARANTOR            Guarantor: Lamar Montiel     : 2000   Address: [address] Sex: [place]     [place] [address]     Relation to Patient: Self       Home Phone: [phone]   Guarantor ID: [id number]       Work Phone:     GUARANTOR EMPLOYER   Employer: [employer]         Status: FULL TIME   COVERAGE          PRIMARY INSURANCE   Payor: AETNA COMMERCIAL Plan: GEHA - ASA   Group Number: [id number] Insurance Type: INDEMNITY   Subscriber Name: RENEE MONTIEL Subscriber : 1974   Subscriber ID: [id number] [place]age [place] [address]   Pat. Rel. to Subscriber: Child Coverage Phone:     SECONDARY INSURANCE   Payor: N/A Plan: N/A   Group Number:   Insurance Type:     Subscriber Name:   Subscriber :     Subscriber ID:   Coverage Address:     Pat. Rel. to Subscriber:   Coverage Phone:        Contact Serial # ([id number])  `       2021    Chart ID ([id number])            Encounter Information     Department Encounter #   2021  9:25 AM  Pad 4b [id number]   Sylvain Haley DO   Physician   Hospitalist   H&P      Signed   Date of Service:  21   Creation Time:  21            Signed        Expand AllCollapse All      Show:Clear  "all  [x]Manual[x]Template[]Copied    Added by:  [x]Sylvain Haley,     []Beckie for details       [facility] Medicine Services  HISTORY AND PHYSICAL     Date of Admission: 9/12/2021  Primary Care Physician: Provider, No Known     Subjective      Chief Complaint: Shortness of breath     History of Present Illness  Patient is a 21-year-old female approximately 4 to 5 months postpartum otherwise no medical history who presents today to the ER from urgent care.  She had gone to urgent care for approximately 3 to 4 days of congestion and nonproductive cough.  Today she began to feel more short of breath and like she was having some tightness in her chest.  Denies any actual chest pain just relates it is more of a problem getting a deep breath.  Says she had a fever of up to 101.6 at home last night.  She has not had the best appetite and has not been eating or drinking much lately.  Otherwise no abdominal pain, vomiting, diarrhea.  No focal numbness tingling or weakness.  In the ER on arrival her sat was 85% on room air.  She swab positive for Covid and had mildly elevated D-dimer.  She was sent for CTA which was negative for PE but showed bilateral infiltrates.  She is currently on 2 L and feels much better after oxygen was applied, sat 95%.  She was given Decadron.  We have been asked to admit for further work-up and care.           Review of Systems   Otherwise complete ROS reviewed and negative except as mentioned in the HPI.     Past Medical History:   Medical History        Past Medical History:                        Objective      Vital Signs: /64   Pulse 79   Temp 98.2 °F (36.8 °C) (Oral)   Resp 23   Ht 177.8 cm (70\")   Wt 65.3 kg (144 lb)   LMP 08/22/2021 (Approximate)   SpO2 96%   BMI 20.66 kg/m²   Physical Exam   GEN: Awake, alert, interactive, in NAD, speaking in full sentenses  HEENT: EOMI, Anicteric, Trachea midline  Lungs: equal chest rise b/l, good " "respiratory effort, no accessory muscle use  Heart: Regular rate and rhythm  ABD: Nondistended, flat  Extremities:  no cyanosis, no edema  Skin: no visible rashes or petechiae  Neuro: AAOx3, no focal deficits  Psych: normal mood & affect     Procalcitonin [[id number]]  (Abnormal) Collected: 09/12/21 0936      Specimen: Blood Updated: 09/12/21 1033       Procalcitonin 0.27 ng/mL       Narrative:       As a Marker for Sepsis (Non-Neonates):      1. <0.5 ng/mL represents a low risk of severe sepsis and/or septic shock.  2. >2 ng/mL represents a high risk of severe sepsis and/or septic shock.     As a Marker for Lower Respiratory Tract Infections that require antibiotic therapy:  PCT on Admission     Antibiotic Therapy             6-12 Hrs later  >0.5                          Strongly Recommended            >0.25 - <0.5             Recommended  0.1 - 0.25                  Discouraged                       Remeasure/reassess PCT  <0.1                         Strongly Discouraged         Remeasure/reassess PCT       As 28 day mortality risk marker: \"Change in Procalcitonin Result\" (>80% or <=80%) if Day 0 (or Day 1) and Day 4 values are available. Refer to http://www.[facility]-pct-calculator.com/     Change in PCT <=80 %   A decrease of PCT levels below or equal to 80% defines a positive change in PCT test result representing a higher risk for 28-day all-cause mortality of patients diagnosed with severe sepsis or septic shock.     Change in PCT >80 %   A decrease of PCT levels of more than 80% defines a negative change in PCT result representing a lower risk for 28-day all-cause mortality of patients diagnosed with severe sepsis or septic shock.                       Ferritin [[id number]]  (Normal) Collected: 09/12/21 0936     Specimen: Blood Updated: 09/12/21 1033       Ferritin 48.44 ng/mL       Narrative:       Results may be falsely decreased if patient taking Biotin.        C-reactive Protein [[id number]]  (Abnormal) " Collected: 09/12/21 0936     Specimen: Blood Updated: 09/12/21 1029       C-Reactive Protein 16.16 mg/dL       Comprehensive Metabolic Panel [[id number]]  (Abnormal) Collected: 09/12/21 0936     Specimen: Blood Updated: 09/12/21 1029       Glucose 118 mg/dL         BUN 12 mg/dL         Creatinine 0.73 mg/dL         Sodium 137 mmol/L         Potassium 3.1 mmol/L         Chloride 103 mmol/L         CO2 20.0 mmol/L         Calcium 8.8 mg/dL         Total Protein 7.9 g/dL         Albumin 4.10 g/dL         ALT (SGPT) 18 U/L         AST (SGOT) 29 U/L         Alkaline Phosphatase 85 U/L         Total Bilirubin 0.4 mg/dL         eGFR Non African Amer 99 mL/min/1.73         Globulin 3.8 gm/dL         A/G Ratio 1.1 g/dL         BUN/Creatinine Ratio 16.4       Anion Gap 14.0 mmol/L       Narrative:       GFR Normal >60  Chronic Kidney Disease <60  Kidney Failure <15        Lactate Dehydrogenase [[id number]]  (Abnormal) Collected: 09/12/21 0936     Specimen: Blood Updated: 09/12/21 1028        U/L       Troponin [[id number]]  (Normal) Collected: 09/12/21 0936     Specimen: Blood Updated: 09/12/21 1027       Troponin T <0.010 ng/mL       Narrative:       Troponin T Reference Range:  <= 0.03 ng/mL-   Negative for AMI  >0.03 ng/mL-     Abnormal for myocardial necrosis.  Clinicians would have to utilize clinical acumen, EKG, Troponin and serial changes to determine if it is an Acute Myocardial Infarction or myocardial injury due to an underlying chronic condition.         Results may be falsely decreased if patient taking Biotin.        BNP [[id number]]  (Normal) Collected: 09/12/21 0936     Specimen: Blood Updated: 09/12/21 1026       proBNP 210.4 pg/mL       Narrative:       Among patients with dyspnea, NT-proBNP is highly sensitive for the detection of acute congestive heart failure. In addition NT-proBNP of <300 pg/ml effectively rules out acute congestive heart failure with 99% negative predictive value.     Results may be  falsely decreased if patient taking Biotin.        COVID-19,[facility] [employer] IN-HOUSE,Nasal Swab No Transport Media 3-4 HR TAT - Swab, Nasal Cavity [[id number]]  (Abnormal) Collected: 09/12/21 0939     Specimen: Swab from Nasal Cavity Updated: 09/12/21 1025       COVID19 Detected     Narrative:       Fact sheet for providers: https://www.fda.gov/media/294822/download      Fact sheet for patients: https://www.fda.gov/media/666868/download     Test performed by PCR.     Consider negative results in combination with clinical observations, patient history, and epidemiological information.     Lipase [[id number]]  (Normal) Collected: 09/12/21 0936     Specimen: Blood Updated: 09/12/21 1024       Lipase 42 U/L       Lactic Acid, Plasma [[id number]]  (Normal) Collected: 09/12/21 0936     Specimen: Blood Updated: 09/12/21 1011       Lactate 1.5 mmol/L       D-dimer, Quantitative [[id number]]  (Abnormal) Collected: 09/12/21 0936     Specimen: Blood Updated: 09/12/21 1006       D-Dimer, Quantitative 1.75 mg/L (FEU)       Narrative:       Reference Range is 0-0.50 mg/L FEU. However, results <0.50 mg/L FEU tends to rule out DVT or PE. Results >0.50 mg/L FEU are not useful in predicting absence or presence of DVT or PE.        CBC & Differential [[id number]]  (Abnormal) Collected: 09/12/21 0936     Specimen: Blood Updated: 09/12/21 1003     Narrative:       The following orders were created for panel order CBC & Differential.  Procedure                               Abnormality         Status                     ---------                               -----------         ------                     CBC Auto Differential[[id number]]        Abnormal            Final result                  Please view results for these tests on the individual orders.     CBC Auto Differential [[id number]]  (Abnormal) Collected: 09/12/21 0936     Specimen: Blood Updated: 09/12/21 1003       WBC 10.41 10*3/mm3         RBC 4.88 10*6/mm3         Hemoglobin 13.4 g/dL          Hematocrit 40.1 %         MCV 82.2 fL         MCH 27.5 pg         MCHC 33.4 g/dL         RDW 14.5 %         RDW-SD 42.7 fl         MPV 11.7 fL         Platelets 157 10*3/mm3         Neutrophil % 82.6 %         Lymphocyte % 13.4 %         Monocyte % 3.2 %         Eosinophil % 0.1 %         Basophil % 0.2 %         Immature Grans % 0.5 %         Neutrophils, Absolute 8.60 10*3/mm3         Lymphocytes, Absolute 1.40 10*3/mm3         Monocytes, Absolute 0.33 10*3/mm3         Eosinophils, Absolute 0.01 10*3/mm3         Basophils, Absolute 0.02 10*3/mm3         Immature Grans, Absolute 0.05 10*3/mm3         nRBC 0.0 /100 WBC       Blood Culture - Blood, Arm, Left [[id number]] Collected: 09/12/21 0936     Specimen: Blood from Arm, Left Updated: 09/12/21 0955     Blood Culture - Blood, Arm, Left [[id number]] Collected: 09/12/21 0937     Specimen: Blood from Arm, Left Updated: 09/12/21 0955     Blood Gas, Arterial - [[id number]]  (Abnormal) Collected: 09/12/21 0942     Specimen: Arterial Blood Updated: 09/12/21 0940       Site Right Brachial       Agusto's Test N/A       pH, Arterial 7.427 pH units         pCO2, Arterial 29.8 mm Hg         Comment: 84 Value below reference range          pO2, Arterial 58.5 mm Hg         Comment: 84 Value below reference range          HCO3, Arterial 19.6 mmol/L         Comment: 84 Value below reference range          Base Excess, Arterial -3.6 mmol/L         Comment: 84 Value below reference range          O2 Saturation, Arterial 93.0 %         Comment: 84 Value below reference range          Temperature 37.0 C         Barometric Pressure for Blood Gas 755 mmHg         Modality Room Air       Ventilator Mode NA       Collected by [id number]       Comment: Meter: [id number]     :  [id number]          pCO2, Temperature Corrected 29.8 mm Hg         pH, Temp Corrected 7.427 pH Units         pO2, Temperature Corrected 58.5 mm Hg                     Imaging Results (Last 24 Hours)       Procedure Component Value Units Date/Time     CT Angiogram Chest [[id number]] Collected: 09/12/21 1146       Updated: 09/12/21 1153     Narrative:       CT ANGIOGRAM CHEST- 9/12/2021 11:25 AM CDT      HISTORY: O2 sat 85%, recent pregnancy, evaluate for PE      COMPARISON: None.      DOSE LENGTH PRODUCT: 242 mGy cm. Automated exposure control was also  utilized to decrease patient radiation dose.     TECHNIQUE: Helical tomographic images of the chest were obtained after  the administration of intravenous contrast following angiogram protocol.  Additionally, 3D and multiplanar reformatted images were provided.        FINDINGS:    Pulmonary arteries: There is adequate enhancement of the pulmonary  arteries to evaluate for central and segmental pulmonary emboli. There  are no filling defects within the main, lobar, segmental or visualized  subsegmental pulmonary arteries. The pulmonary arteries are within  normal limits for size.      Aorta and great vessels: The aorta is well opacified and demonstrates no  dissection or aneurysm. The great vessels are normal in appearance.     Visualized neck base: The imaged portion of the base of the neck appears  unremarkable.      Lungs: Patchy nodular infiltrates are present in the right upper lobe  and right lower lobe. There is consolidation in the medial segment right  middle lobe. This may be pneumonia.. The trachea and bronchial tree are  patent.      Heart: The heart is normal in size. There is no pericardial effusion.      Mediastinum and lymph nodes: No enlarged mediastinal, hilar, or axillary  lymph nodes are present.      Skeletal and soft tissues: The osseous structures of the thorax and  surrounding soft tissues demonstrate no acute process.     Upper abdomen: The imaged portion of the upper abdomen demonstrates no  acute process.         Impression:       1. There is no evidence of embolic disease.        2. Patchy nodular infiltrates are present. Most likely this  is  pneumonia. Another differential consideration would be septic emboli..        This report was finalized on 09/12/2021 11:50 by Dr. Joselo Wright MD.     XR Chest AP [[id number]] Collected: 09/12/21 1020       Updated: 09/12/21 1023     Narrative:       EXAM: XR CHEST AP- 9/12/2021 9:44 AM CDT     HISTORY: COVID Evaluation, Cough, Fever       COMPARISON: None.     TECHNIQUE: Frontal radiograph of the chest     FINDINGS:   The lungs are clear. The cardiomediastinal silhouette and pulmonary  vascularity are within normal limits.      The osseous structures and surrounding soft tissues demonstrate no acute  abnormality.           Impression:       1. No radiographic evidence of acute cardiopulmonary process.        This report was finalized on 09/12/2021 10:20 by Dr. Joselo Wright MD.          I have personally reviewed and interpreted the radiology studies and ECG obtained at time of admission.      Assessment / Plan      Assessment:        Active Hospital Problems     Diagnosis     • **Acute hypoxemic respiratory failure due to COVID-19 (CMS/[facility])     • COVID-19 virus infection     • Hypokalemia     • Positive D dimer        Plan:   #1 COVID-19 -patient states she first had some runny nose/nasal congestion on Thursday 9/9.  Now presents and on Sunday 9/12 hypoxic with infiltrate positive for Covid.  Suspect she likely had disease prior to initial symptoms and did not realize it due to her otherwise young age and healthy condition.  Reported fever at home but none yet here.  White count normal.  Pro-Roberto mildly elevated.  Will monitor off antibiotics for now.  Continue Decadron daily.  We will start remdesivir.  Other supportive meds to include vitamin C, thiamine, zinc, melatonin.     #2 acute respiratory failure with hypoxia -in the setting of #1 above.  Patient did have a mildly elevated D-dimer which is not uncommon in the setting of COVID-19.  Her CTA was negative for PE.  There are bilateral infiltrates and  secondary infection would remain in differential but less likely given duration of illness.  Monitor for antibiotic needs.  Supportive care.  Sepsis from 2.     #3 hypokalemia -replace and recheck.  Check magnesium level     #4 elevated D-dimer -as above CTA negative.  Likely in the setting of her Covid viral illness.  Will be on Lovenox DVT prophylaxis.     Code Status/Advanced Care Plan: Full Code     The patient's surrogate decision maker is her mother Renee.      I discussed my findings and recommendations with the patient direclty at bedside and her mother as requested.     Estimated length of stay is 2+ days.      The patient was seen and examined by me on 9/12/21 at 12:45 PM.     Electronically signed by Sylvain Haley DO, 09/12/21, 13:12 CDT.                               ED to Hosp-Admission (Current) on 9/12/2021     ED to Hosp-Admission (Current) on 9/12/2021 13/21 0836  102.7 (39.3)Abnormal    89  18  112/66  nasal cannula  2  93   Temp: RN notified at 09/13/21 0836   09/13/21 0800  --  --  --  --  nasal cannula with ETCO2  2  --   09/13/21 0528  100.1 (37.8)   80  18  110/70  nasal cannula  2  93   Temp: selma Ye RN aware at 09/13/21 0528   09/13/21 0116  102.8 (39.3)Abnormal   106  20  101/72  nasal cannula  2  94   09/12/21 2150  101.9 (38.8)Abnormal   86  24  101/59  nasal cannula  2  96   09/12                   CT Angiogram Chest [[id number]] (Order [id number])  Order  Status: Final result   Patient Location    Patient Class Location   Inpatient [facility] 4B, 442, 1     [phone]   Study Notes     Paulino Pires on 9/12/2021 11:33 AM   : pt sent from urgent care for low o2 sat. pt reports cough fever head congestion started 4 days ago. has since developed fever.   History Obtained From: patient         Appointment Information    PACS Images     Radiology Images   Study Result    Narrative & Impression   CT ANGIOGRAM CHEST- 9/12/2021 11:25 AM CDT      HISTORY: O2 sat 85%, recent  pregnancy, evaluate for PE      COMPARISON: None.      DOSE LENGTH PRODUCT: 242 mGy cm. Automated exposure control was also  utilized to decrease patient radiation dose.     TECHNIQUE: Helical tomographic images of the chest were obtained after  the administration of intravenous contrast following angiogram protocol.  Additionally, 3D and multiplanar reformatted images were provided.        FINDINGS:    Pulmonary arteries: There is adequate enhancement of the pulmonary  arteries to evaluate for central and segmental pulmonary emboli. There  are no filling defects within the main, lobar, segmental or visualized  subsegmental pulmonary arteries. The pulmonary arteries are within  normal limits for size.      Aorta and great vessels: The aorta is well opacified and demonstrates no  dissection or aneurysm. The great vessels are normal in appearance.     Visualized neck base: The imaged portion of the base of the neck appears  unremarkable.      Lungs: Patchy nodular infiltrates are present in the right upper lobe  and right lower lobe. There is consolidation in the medial segment right  middle lobe. This may be pneumonia.. The trachea and bronchial tree are  patent.      Heart: The heart is normal in size. There is no pericardial effusion.      Mediastinum and lymph nodes: No enlarged mediastinal, hilar, or axillary  lymph nodes are present.      Skeletal and soft tissues: The osseous structures of the thorax and  surrounding soft tissues demonstrate no acute process.     Upper abdomen: The imaged portion of the upper abdomen demonstrates no  acute process.      IMPRESSION:  1. There is no evidence of embolic disease.        2. Patchy nodular infiltrates are present. Most likely this is  pneumonia. Another differential consideration would be septic emboli..        This report was finalized on 09/12/2021 11:50 by Dr. Joselo Wright MD.   Imaging    CT Angiogram Chest (Order: [id number]) - 9/12/2021  Result History    CT  Angiogram Chest (Order #[id number]) on 9/12/2021 - Order Result History Report   Reprint Order Requisition    CT Angiogram Chest (Order #[id number]) on 9/12/21   Signed by    Signed Date/Time  Phone Pager   KELLEE JOSE GINI 9/12/2021 11:50 AM 09122021 11:50 [phone]    Exam Information    Status Exam Begun  Exam Ended    Final [99] 9/12/2021 11:25 9/12/2021 11:32 AM 09122021 11:32 AM   Questions    Patient Pregnant Unknown      Release to patient Immediate      Imaging Related Medications          Current Facility-Administered Medications   Medication Dose Route Frequency Provider Last Rate Last Admin   • acetaminophen (TYLENOL) tablet 650 mg  650 mg Oral Q4H PRN Sylvain Haley DO   650 mg at 09/13/21 0842   • ascorbic acid (VITAMIN C) tablet 500 mg  500 mg Oral Daily Sylvain Haley DO   500 mg at 09/13/21 0842   • dexamethasone (DECADRON) tablet 6 mg  6 mg Oral Daily With Breakfast Sylvain Haley DO   6 mg at 09/13/21 0842   • enoxaparin (LOVENOX) syringe 40 mg  40 mg Subcutaneous Q24H Sylvain Haley DO   40 mg at 09/13/21 0843   • famotidine (PEPCID) tablet 20 mg  20 mg Oral BID Sylvain Haley DO   20 mg at 09/13/21 0842   • melatonin tablet 3 mg  3 mg Oral Nightly Sylvain Haley DO   3 mg at 09/12/21 2245   • ondansetron (ZOFRAN) injection 4 mg  4 mg Intravenous Q6H PRN Sylvain Haley DO   4 mg at 09/12/21 1823   • Pharmacy Consult - Remdesivir   Does not apply Continuous PRN Sylvain Haley DO       • remdesivir 100 mg in sodium chloride 0.9 % 270 mL IVPB (powder vial)  100 mg Intravenous Q24H Sylvain Haley DO       • sodium chloride 0.9 % flush 10 mL  10 mL Intravenous PRN Maritza Owen APRN       • sodium chloride 0.9 % flush 10 mL  10 mL Intravenous PRN Maritza Owen APRN       • sodium chloride 0.9 % flush 10 mL  10 mL Intravenous Q12H Sylvain Haley DO   10 mL at 09/13/21 0842   • sodium chloride 0.9 % flush 10 mL  10 mL Intravenous PRN Sylvain Haley, DO        • sodium chloride 0.9 % infusion  75 mL/hr Intravenous Continuous Sylvain Haley DO 75 mL/hr at 09/13/21 0845 75 mL/hr at 09/13/21 0845   • thiamine (VITAMIN B-1) tablet 100 mg  100 mg Oral Daily Sylvain Haley DO   100 mg at 09/13/21 0842   • zinc sulfate (ZINCATE) capsule 220 mg  220 mg Oral Daily Sylvain Haley DO   220 mg at 09/13/21 0842

## 2021-09-13 NOTE — PLAN OF CARE
Goal Outcome Evaluation:  Plan of Care Reviewed With: patient        Progress: no change  Outcome Summary: pt denies pain this shift. VSS. s 82-97. pt rested well this shift. 2L NC with o2 sat in 90s. congest cough at times. alert and oriented. no falls. safety maintained.

## 2021-09-13 NOTE — PLAN OF CARE
Goal Outcome Evaluation:  Plan of Care Reviewed With: patient        Progress: no change  Outcome Summary: VENOUS DOPPLER TODAY NEGATIVE. FREQUENT CONGESTED COUGH CONTINUES, MUCINEX AND OPEP ORDERED. 102.7 TEMP TODAY, TYLENOL GIVEN. SATS WNL ON 2 LITERS. CONTINUE TO MONITOR.

## 2021-09-13 NOTE — PROGRESS NOTES
"1           [facility] Medicine Services  INPATIENT PROGRESS NOTE    Patient Name: Lamar Rodriguez  Date of Admission: 9/12/2021  Today's Date: 09/13/21  Length of Stay: 1  Primary Care Physician: Provider, No Known    Subjective   Chief Complaint: Follow-up  HPI   21-year-old woman admitted yesterday for acute hypoxic respiratory failure in the setting of COVID-19.  CT imaging showed patchy nodular infiltrates present.  No evidence of DVT or PE on imaging studies.    She is febrile with T-max of 102.8.  She is requiring oxygen at 2 L.  She is on day 2 of Remdesivir.  She is on other supportive care including dexamethasone, zinc, famotidine and DVT prophylaxis        COVID LABS:  Results From Last 14 Days   Lab Units 09/12/21  0936   PROBNP pg/mL 210.4   CRP mg/dL 16.16*   D DIMER QUANT mg/L (FEU) 1.75*   FERRITIN ng/mL 48.44   LACTATE mmol/L 1.5   LDH U/L 243*   PROCALCITONIN ng/mL 0.27*   TROPONIN T ng/mL <0.010     \"I am doing better.\"  Positive nonproductive cough, fever  Review of Systems     All pertinent negatives and positives are as above. All other systems have been reviewed and are negative unless otherwise stated.     Objective    Temp:  [98.5 °F (36.9 °C)-102.8 °F (39.3 °C)] 98.6 °F (37 °C)  Heart Rate:  [] 76  Resp:  [18-24] 18  BP: (101-112)/(59-72) 104/64  Physical Exam   On 2 L oxygen with O2 saturation in the low to mid 90s  Pleasant  No distress  Watching on her phone while laying comfortably on bed  GEN: Awake, alert, interactive,speaking in full sentenses  HEENT: EOMI, Anicteric, Trachea midline  Lungs: equal chest rise b/l, good respiratory effort, no accessory muscle use  Heart: Regular rate and rhythm  ABD: Nondistended, flat  Extremities:  no cyanosis, no edema  Skin: no visible rashes or petechiae  Neuro: AAOx3, no focal deficits  Psych: normal mood & affect      Results Review:  I have reviewed the labs, radiology results, and diagnostic " studies.    Laboratory Data:   Results from last 7 days   Lab Units 09/13/21  0455 09/12/21  0936   WBC 10*3/mm3 7.05 10.41   HEMOGLOBIN g/dL 11.2* 13.4   HEMATOCRIT % 34.1 40.1   PLATELETS 10*3/mm3 160 157        Results from last 7 days   Lab Units 09/13/21  0455 09/12/21  0936   SODIUM mmol/L 141 137   POTASSIUM mmol/L 3.6 3.1*   CHLORIDE mmol/L 111* 103   CO2 mmol/L 20.0* 20.0*   BUN mg/dL 13 12   CREATININE mg/dL 0.60 0.73   CALCIUM mg/dL 8.4* 8.8   BILIRUBIN mg/dL 0.3 0.4   ALK PHOS U/L 70 85   ALT (SGPT) U/L 12 18   AST (SGOT) U/L 22 29   GLUCOSE mg/dL 107* 118*       Culture Data:   Blood Culture   Date Value Ref Range Status   09/12/2021 No growth at 24 hours  Preliminary   09/12/2021 No growth at 24 hours  Preliminary       Radiology Data:   Imaging Results (Last 24 Hours)     Procedure Component Value Units Date/Time    US Venous Doppler Lower Extremity Bilateral (duplex) [[id number]] Collected: 09/13/21 1138     Updated: 09/13/21 1142    Narrative:      History: Swelling       Impression:      Impression: There is no evidence of deep venous thrombosis in either  lower extremity.     Comments: Bilateral lower extremity venous duplex exam was performed  using color Doppler flow, Doppler waveform analysis, and grayscale  imaging, with and without compression. There is no evidence of deep  venous thrombosis in the common femoral, popliteal, peroneal, and  posterior tibial veins bilaterally.         This report was finalized on 09/13/2021 11:39 by Dr. Michael Castrejon MD.          I have reviewed the patient's current medications.     Assessment/Plan     Active Hospital Problems    Diagnosis    • **Acute hypoxemic respiratory failure due to COVID-19 (CMS/HCC)    • COVID-19 virus infection    • Hypokalemia    • Positive D dimer        COVID-19 with pneumonia  Acute respiratory failure with hypoxia  Hypokalemia  Elevated D-dimer likely secondary to COVID-19 without evidence of PE or DVT  Fever secondary to  above      Day 2 of Remdesivir  Continue dexamethasone plus famotidine and adjunct medications  DVT prophylaxis  Encourage increase activities as tolerated  O2 to maintain O2 saturation between 90 to 94%  Encourage incentive spirometry.  Will add flutter valve and Mucinex.  Discuss plan of care.  Encouraged to ask question.  Encouraged to prone or stay on the chair  Continue supportive care      ascorbic acid, 500 mg, Oral, Daily  dexamethasone, 6 mg, Oral, Daily With Breakfast  enoxaparin, 40 mg, Subcutaneous, Q24H  famotidine, 20 mg, Oral, BID  guaiFENesin, 1,200 mg, Oral, Q12H  melatonin, 3 mg, Oral, Nightly  remdesivir, 100 mg, Intravenous, Q24H  sodium chloride, 10 mL, Intravenous, Q12H  thiamine, 100 mg, Oral, Daily  zinc sulfate, 220 mg, Oral, Daily            Discharge Planning: To be determined    Electronically signed by Nghia Flowers MD, 09/13/21, 14:34 CDT.

## 2021-09-14 LAB
ALBUMIN SERPL-MCNC: 3.2 G/DL (ref 3.5–5.2)
ALP SERPL-CCNC: 62 U/L (ref 39–117)
ALT SERPL W P-5'-P-CCNC: 13 U/L (ref 1–33)
AST SERPL-CCNC: 21 U/L (ref 1–32)
BILIRUB CONJ SERPL-MCNC: <0.2 MG/DL (ref 0–0.3)
BILIRUB INDIRECT SERPL-MCNC: ABNORMAL MG/DL
BILIRUB SERPL-MCNC: 0.2 MG/DL (ref 0–1.2)
CREAT SERPL-MCNC: 0.4 MG/DL (ref 0.57–1)
GFR SERPL CREATININE-BSD FRML MDRD: >150 ML/MIN/1.73
PROT SERPL-MCNC: 6.4 G/DL (ref 6–8.5)
QT INTERVAL: 350 MS
QTC INTERVAL: 432 MS

## 2021-09-14 PROCEDURE — 25010000002 ENOXAPARIN PER 10 MG: Performed by: INTERNAL MEDICINE

## 2021-09-14 PROCEDURE — 63710000001 DEXAMETHASONE PER 0.25 MG: Performed by: INTERNAL MEDICINE

## 2021-09-14 PROCEDURE — 36415 COLL VENOUS BLD VENIPUNCTURE: CPT | Performed by: INTERNAL MEDICINE

## 2021-09-14 PROCEDURE — 82565 ASSAY OF CREATININE: CPT | Performed by: INTERNAL MEDICINE

## 2021-09-14 PROCEDURE — 80076 HEPATIC FUNCTION PANEL: CPT | Performed by: INTERNAL MEDICINE

## 2021-09-14 PROCEDURE — 94799 UNLISTED PULMONARY SVC/PX: CPT

## 2021-09-14 RX ADMIN — REMDESIVIR 100 MG: 100 INJECTION, POWDER, LYOPHILIZED, FOR SOLUTION INTRAVENOUS at 21:21

## 2021-09-14 RX ADMIN — THIAMINE HCL TAB 100 MG 100 MG: 100 TAB at 09:30

## 2021-09-14 RX ADMIN — DEXAMETHASONE 6 MG: 4 TABLET ORAL at 09:28

## 2021-09-14 RX ADMIN — FAMOTIDINE 20 MG: 20 TABLET, FILM COATED ORAL at 09:28

## 2021-09-14 RX ADMIN — Medication 3 MG: at 21:21

## 2021-09-14 RX ADMIN — SODIUM CHLORIDE 75 ML/HR: 9 INJECTION, SOLUTION INTRAVENOUS at 13:20

## 2021-09-14 RX ADMIN — ACETAMINOPHEN 650 MG: 325 TABLET, FILM COATED ORAL at 13:20

## 2021-09-14 RX ADMIN — ZINC SULFATE 220 MG (50 MG) CAPSULE 220 MG: CAPSULE at 09:29

## 2021-09-14 RX ADMIN — ENOXAPARIN SODIUM 40 MG: 40 INJECTION SUBCUTANEOUS at 09:28

## 2021-09-14 RX ADMIN — SODIUM CHLORIDE, PRESERVATIVE FREE 10 ML: 5 INJECTION INTRAVENOUS at 21:27

## 2021-09-14 RX ADMIN — GUAIFENESIN 1200 MG: 600 TABLET, EXTENDED RELEASE ORAL at 21:21

## 2021-09-14 RX ADMIN — GUAIFENESIN 1200 MG: 600 TABLET, EXTENDED RELEASE ORAL at 09:29

## 2021-09-14 RX ADMIN — FAMOTIDINE 20 MG: 20 TABLET, FILM COATED ORAL at 21:21

## 2021-09-14 RX ADMIN — OXYCODONE HYDROCHLORIDE AND ACETAMINOPHEN 500 MG: 500 TABLET ORAL at 09:29

## 2021-09-14 NOTE — PLAN OF CARE
Goal Outcome Evaluation:  Plan of Care Reviewed With: patient        Progress: improving  Outcome Summary: Patient had 101.2 temp around noon, given tylenol and brought down to 98.6. Patient encourage to do proning, side to side positioning and also the use of IS. Patient oxygen titrated down to 1.5L this afternoon. oxygen ranging from 90-93%. Continue current tx and monitoring pt.

## 2021-09-14 NOTE — PROGRESS NOTES
[facility] Medicine Services  INPATIENT PROGRESS NOTE    Patient Name: Lamar Rodriguez  Date of Admission: 9/12/2021  Today's Date: 09/14/21  Length of Stay: 2  Primary Care Physician: Provider, No Known    Subjective   Chief Complaint: Follow-up  HPI   Patient expressed that she is feeling better  Still has coughing spell particularly with deep inspiration  Reports to me that she is able to reach 1500 [address] after the first couple of try  Still has episodes of fever  Diarrhea resolved  No bowel movement today.        Review of Systems     All pertinent negatives and positives are as above. All other systems have been reviewed and are negative unless otherwise stated.     Objective    Temp:  [97.2 °F (36.2 °C)-101.2 °F (38.4 °C)] 98.6 °F (37 °C)  Heart Rate:  [60-86] 70  Resp:  [16-20] 18  BP: ()/(53-70) 110/70  Physical Exam  Seated comfortably on recliner without any distress  On 2 L oxygen with O2 saturation in the low to mid 90s  Pleasant  No distress  Watching on her phone while laying comfortably on bed  GEN: Awake, alert, interactive,speaking in full sentenses  HEENT: EOMI, Anicteric, Trachea midline  Lungs: equal chest rise b/l, good respiratory effort, no accessory muscle use  Heart: Regular rate and rhythm  ABD: Nondistended, flat  Extremities:  no cyanosis, no edema  Skin: no visible rashes or petechiae  Neuro: AAOx3, no focal deficits  Psych: normal mood & affect          Results Review:  I have reviewed the labs, radiology results, and diagnostic studies.    Laboratory Data:   Results from last 7 days   Lab Units 09/13/21  0455 09/12/21  0936   WBC 10*3/mm3 7.05 10.41   HEMOGLOBIN g/dL 11.2* 13.4   HEMATOCRIT % 34.1 40.1   PLATELETS 10*3/mm3 160 157        Results from last 7 days   Lab Units 09/14/21  0521 09/13/21  0455 09/12/21  0936   SODIUM mmol/L  --  141 137   POTASSIUM mmol/L  --  3.6 3.1*   CHLORIDE mmol/L  --  111* 103   CO2 mmol/L  --  20.0* 20.0*   BUN  mg/dL  --  13 12   CREATININE mg/dL 0.40* 0.60 0.73   CALCIUM mg/dL  --  8.4* 8.8   BILIRUBIN mg/dL 0.2 0.3 0.4   ALK PHOS U/L 62 70 85   ALT (SGPT) U/L 13 12 18   AST (SGOT) U/L 21 22 29   GLUCOSE mg/dL  --  107* 118*       Culture Data:   Blood Culture   Date Value Ref Range Status   09/12/2021 No growth at 2 days  Preliminary   09/12/2021 No growth at 2 days  Preliminary       Radiology Data:   Imaging Results (Last 24 Hours)     ** No results found for the last 24 hours. **          I have reviewed the patient's current medications.     Assessment/Plan     Active Hospital Problems    Diagnosis    • **Acute hypoxemic respiratory failure due to COVID-19 (CMS/HCC)    • COVID-19 virus infection    • Hypokalemia    • Positive D dimer        COVID-19 with pneumonia  Acute respiratory failure with hypoxia  Hypokalemia  Elevated D-dimer likely secondary to COVID-19 without evidence of PE or DVT  Fever secondary to above       Day 3 Remdesivir  Continue dexamethasone plus famotidine and adjunct medications  Continue DVT prophylaxis  Encourage increase activities  O2 to maintain saturation between 90 to 94%  Encourage IS/flutter  Patient clinically doing better with exception that she still has fever needing antipyretic.  DC IV fluid          Discharge Planning: tbd    Electronically signed by Nghia Flowers MD, 09/14/21, 18:02 CDT.

## 2021-09-14 NOTE — PAYOR COMM NOTE
"9/14/21. [facility]    FABIO [phone]  [phone]    MD HAS NOT SEEN PATIENT TODAY. FAXING WHAT I HAVE. WILL FAX PROGRESS NOTES IN AM .    THANK YOU.          Lamar Montiel (21 y.o. Female)     Date of Birth Social Security Number Address Home Phone MRN    2000  [address] [place] [place] [address] [phone] [id number]    Congregational Marital Status          Mosque Single       Admission Date Admission Type Admitting Provider Attending Provider Department, Room/Bed    9/12/21 Emergency Nghia Flowers MD [place], Glenn Riego, MD [address], 442/1    Discharge Date Discharge Disposition Discharge Destination                       Attending Provider: Nghia Flowers MD    Allergies: No Known Allergies    Isolation: Enh Drop/Con   Infection: COVID (confirmed) (09/12/21)   Code Status: CPR    Ht: 177.8 cm (70\")   Wt: 68.1 kg (150 lb 3 oz)    Admission Cmt: None   Principal Problem: Acute hypoxemic respiratory failure due to COVID-19 (CMS/[facility]) [U07.1,J96.01]                 Active Insurance as of 9/12/2021     Primary Coverage     Payor Plan Insurance Group Employer/Plan Group    [employer] COMMERCIAL GEHA - ASA [id number]     Payor Plan Address Payor Plan Phone Number Payor Plan Fax Number Effective Dates    [address]   1/1/2016 - None Entered    [facility] [id number]       Subscriber Name Subscriber Birth Date Member ID       RENEE MONTIEL 4/18/1972 [id number]                 Emergency Contacts      (Rel.) Home Phone Work Phone Mobile Phone    Renee Montiel (Mother) [phone] -- --        [facility] Encounter Date/Time: 9/12/2021 0925   Hospital Account: [id number]    MRN: [id number]   Patient:  Lamar Montiel   Contact Serial #: [id number]   SSN:          ENCOUNTER             Patient Class: Inpatient   Unit: [address] Service: Medicine     Bed: 442/1   Admitting Provider: Nghia Flowers*   Referring " Physician:     Attending Provider: Nghia Flowers*   Adm Diagnosis: Acute hypoxemic respirat*               PATIENT          Name: Lamar Montiel : 2000 (21 yrs)   Address: [address] Sex: Female   City: [place] [address]   County: [facility]   Marital Status: SINGLE Ethnicity: NOT                                                                              Race: WHITE   Primary Care Provider: Provider, No Known Patients Phone: Home Phone: [phone]     Mobile Phone: [phone]   EMERGENCY CONTACT   Contact Name Legal Guardian? Relationship to Patient Home Phone Work Phone   1. Renee Montiel  2. *No Contact Specified* No    Mother    [phone]              GUARANTOR            Guarantor: Lamar Montiel     : 2000   Address: [address] Sex: Female     [place] [address]     Relation to Patient: Self       Home Phone: [phone]   Guarantor ID: [id number]       Work Phone:     GUARANTOR EMPLOYER   Employer: [employer]         Status: FULL TIME   COVERAGE          PRIMARY INSURANCE   Payor: [employer]ET[employer] COMMERCIAL Plan: GEHA - ASA   Group Number: [id number] Insurance Type: INDEMNITY   Subscriber Name: RENEE MONTIEL Subscriber : 1974   Subscriber ID: [id number] Coverage Address: [place] [address]   Pat. Rel. to Subscriber: Child Coverage Phone:     SECONDARY INSURANCE   Payor: N/A Plan: N/A   Group Number:   Insurance Type:     Subscriber Name:   Subscriber :     Subscriber ID:   Coverage Address:     Pat. Rel. to Subscriber:   Coverage Phone:        Contact Serial # ([id number])  `       2021    Chart ID ([id number])               Department Encounter #   2021  9:25 AM  Pad 4b [id number]   Nghia Flowers MD   Physician   Hospitalist   Progress Notes      Signed   Date of Service:  21   Creation Time:  21            Signed             Show:Clear all  [x]Manual[x]Template[x]Copied    Added  by:  [x]Nghia lFowers MD    []Beckie for details  1                                                                 [facility] Medicine Services  INPATIENT PROGRESS NOTE     Patient Name: Lamar Rodriguez  Date of Admission: 9/12/2021  Today's Date: 09/13/21  Length of Stay: 1  Primary Care Physician: Provider, No Known     Subjective   Chief Complaint: Follow-up  HPI   21-year-old woman admitted yesterday for acute hypoxic respiratory failure in the setting of COVID-19.  CT imaging showed patchy nodular infiltrates present.  No evidence of DVT or PE on imaging studies.     She is febrile with T-max of 102.8.  She is requiring oxygen at 2 L.  She is on day 2 of Remdesivir.  She is on other supportive care including dexamethasone, zinc, famotidine and DVT prophylaxis           COVID LABS:       Results From Last 14 Days   Lab Units 09/12/21  0936   PROBNP pg/mL 210.4   CRP mg/dL 16.16*   D DIMER QUANT mg/L (FEU) 1.75*   FERRITIN ng/mL 48.44   LACTATE mmol/L 1.5   LDH U/L 243*   PROCALCITONIN ng/mL 0.27*   TROPONIN T ng/mL <0.010                  Temp:  [98.5 °F (36.9 °C)-102.8 °F (39.3 °C)] 98.6 °F (37 °C)  Heart Rate:  [] 76  Resp:  [18-24] 18  BP: (101-112)/(59-72) 104/64  Physical Exam   On 2 L oxygen with O2 saturation in the low to mid 90s  Pleasant  No distress  Watching on her phone while laying comfortably on bed  GEN: Awake, alert, interactive,speaking in full sentenses  HEENT: EOMI, Anicteric, Trachea midline  Lungs: equal chest rise b/l, good respiratory effort, no accessory muscle use  Heart: Regular rate and rhythm  ABD: Nondistended, flat  Extremities:  no cyanosis, no edema  Skin: no visible rashes or petechiae  Neuro: AAOx3, no focal deficits  Psych: normal mood & affect        Results Review:  I have reviewed the labs, radiology results, and diagnostic studies.     Laboratory Data:         Results from last 7 days   Lab Units 09/13/21  0455 09/12/21  0936    WBC 10*3/mm3 7.05 10.41   HEMOGLOBIN g/dL 11.2* 13.4   HEMATOCRIT % 34.1 40.1   PLATELETS 10*3/mm3 160 157               Results from last 7 days   Lab Units 09/13/21  0455 09/12/21  0936   SODIUM mmol/L 141 137   POTASSIUM mmol/L 3.6 3.1*   CHLORIDE mmol/L 111* 103   CO2 mmol/L 20.0* 20.0*   BUN mg/dL 13 12   CREATININE mg/dL 0.60 0.73   CALCIUM mg/dL 8.4* 8.8   BILIRUBIN mg/dL 0.3 0.4   ALK PHOS U/L 70 85   ALT (SGPT) U/L 12 18   AST (SGOT) U/L 22 29   GLUCOSE mg/dL 107* 118*         Culture Data:         Blood Culture   Date Value Ref Range Status   09/12/2021 No growth at 24 hours   Preliminary   09/12/2021 No growth at 24 hours   Preliminary         Radiology Data:            Imaging Results (Last 24 Hours)      Procedure Component Value Units Date/Time     US Venous Doppler Lower Extremity Bilateral (duplex) [[id number]] Collected: 09/13/21 1138       Updated: 09/13/21 1142     Narrative:       History: Swelling        Impression:       Impression: There is no evidence of deep venous thrombosis in either  lower extremity.     Comments: Bilateral lower extremity venous duplex exam was performed  using color Doppler flow, Doppler waveform analysis, and grayscale  imaging, with and without compression. There is no evidence of deep  venous thrombosis in the common femoral, popliteal, peroneal, and  posterior tibial veins bilaterally.         This report was finalized on 09/13/2021 11:39 by Dr. Michael Castrejon MD.             I have reviewed the patient's current medications.      Assessment/Plan           Active Hospital Problems     Diagnosis     • **Acute hypoxemic respiratory failure due to COVID-19 (CMS/[facility])     • COVID-19 virus infection     • Hypokalemia     • Positive D dimer           COVID-19 with pneumonia  Acute respiratory failure with hypoxia  Hypokalemia  Elevated D-dimer likely secondary to COVID-19 without evidence of PE or DVT  Fever secondary to above        Day 2 of Remdesivir  Continue  dexamethasone plus famotidine and adjunct medications  DVT prophylaxis  Encourage increase activities as tolerated  O2 to maintain O2 saturation between 90 to 94%  Encourage incentive spirometry.  Will add flutter valve and Mucinex.  Discuss plan of care.  Encouraged to ask question.  Encouraged to prone or stay on the chair  Continue supportive care       ascorbic acid, 500 mg, Oral, Daily  dexamethasone, 6 mg, Oral, Daily With Breakfast  enoxaparin, 40 mg, Subcutaneous, Q24H  famotidine, 20 mg, Oral, BID  guaiFENesin, 1,200 mg, Oral, Q12H  melatonin, 3 mg, Oral, Nightly  remdesivir, 100 mg, Intravenous, Q24H  sodium chloride, 10 mL, Intravenous, Q12H  thiamine, 100 mg, Oral, Daily  zinc sulfate, 220 mg, Oral, Daily                 Discharge Planning: To be determined     Electronically signed by Nghia Flowers MD, 09/13/21, 14:34 CDT.                ED to Hosp-Admission (Current) on 9/12/2021     ED to Hosp-Admission (Current) on 9/12/2021      Hepatic Function Panel [LAB20] (Order [id number])  Order  Date: 9/13/2021 Department: [facility] 4B Released By/Authorizing: Sylvain Haley DO (auto-released)   Reprint Order Requisition    Hepatic Function Panel (Order #[id number]) on 9/13/21       Contains abnormal data Hepatic Function Panel  Order: [id number]  Status:  Final result   Visible to patient:  No (scheduled for 9/14/2021  6:28 AM)   Next appt:  None  Specimen Information: Blood         0 Result Notes  Component   Ref Range & Units 05:21   Total Protein   6.0 - 8.5 g/dL 6.4    Albumin   3.50 - 5.20 g/dL 3.20Low     ALT (SGPT)   1 - 33 U/L 13    AST (SGOT)   1 - 32 U/L 21    Alkaline Phosphatase   39 - 117 U/L 62    Total Bilirubin   0.0 - 1.2 mg/dL 0.2    Bilirubin, Direct   0.0 - 0.3 mg/dL <0.2    Bilirubin, Indirect            Creatinine, Serum  Order: [id number]  Status:  Final result   Visible to patient:  No (scheduled for 9/14/2021  6:28 AM)   Next appt:  None  Specimen Information:  "Blood         0 Result Notes  Component   Ref Range & Units 05:21   Creatinine   0.57 - 1.00 mg/dL 0.40Low     eGFR Non African Amer   >60 mL/min/1.73 >150    Resulting Agency            Lamar Rodriguez #[id number] (CSN:[id number]) (21 y.o. F) (Adm: 09/12/21)  [facility] [id number]  Comment     Last edited by  on  at    Attending Provider: Nghia Flowers MD    Allergies: No Known Allergies    Isolation: Enh Drop/Con   Code Status: CPR    Ht: 177.8 cm (70\")   Wt: 68.1 kg (150 lb 3 oz)   Admission Wt: 65.3 kg (144 lb)    Admission Cmt: None         Most Recent CDI DRG Information    DRGs   Type Qualifier DRG Description Exp Reimb Weight SOI ROM AMLOS GMLOS    MS-DRG (FY 2021) V38  177 RESPIRATORY INFECTIONS AND INFLAMMATIONS WITH FDC 13,189.12 1.84   6.80 5.40    APR-DRG (FY 2021) V38  137 MAJOR RESPIRATORY INFECTIONS AND INFLAMMATIONS 0.00 1.59 Extreme Extreme 7.81 6.32   Treatment Team  Chat With All   Treatment Team   Provider Role Specialty From To   Nghia Flowers MD  Attending Provider Hospitalist 09/13/21 0532 --   Anjelica Heath CRT  Respiratory Therapist  Respiratory Therapy 09/14/21 1353 --   Fletcher, Merlina A, RN  Ambulatory   -- 09/14/21 1256 --   Marce Galvez MSW     09/14/21 0900 --   Madonna Lagunas, RN  Registered Nurse  -- 09/14/21 0736 --   Santa Kumar  Patient Care Technician  -- 09/14/21 0758 --   Lakeisha Clark LPN Utilization Manager  -- 09/13/21 0751 --     Orders to Be Acknowledged  Comment  (From admission, onward)     Last edited by  on  at    None   Length of Stay    Actual Admission Date    2 days 09/12/2021    BestPractice Advisories    Click to view active BestPractice Advisories   Treatment Team Sticky Notes  Comment     Last edited by  on  at     Sticky Notes to Physicians  Comment     Last edited by  on  at       Medical Problems  Comment     Last edited by  on  at    Hospital Problem List  Date Reviewed: " 3/29/2018     Codes Priority Class Noted POA   Acute hypoxemic respiratory failure due to COVID-19 (CMS/[facility]) ICD-10-CM: U07.1, J96.01   ICD-9-CM: 518.81, 079.89, 799.02   9/12/2021 Yes   COVID-19 virus infection ICD-10-CM: U07.1   ICD-9-CM: 079.89   9/12/2021 Unknown   Hypokalemia ICD-10-CM: E87.6   ICD-9-CM: 276.8   9/12/2021 Unknown   Positive D dimer ICD-10-CM: R79.89   ICD-9-CM: 790.92   9/12/2021 Unknown   Non-Hospital Problem List  Date Reviewed: 3/29/2018  None   Vital Signs (last 2 days)    Date/Time  Temp  Pulse  Resp  BP  Device (Oxygen Therapy)  Flow (L/min)  SpO2   09/14/21 1253  101.2 (38.4)Abnormal    86  18  106/62  nasal cannula  2  93   Temp: RN notified at 09/14/21 1253   09/14/21 0939  --  --  --  --  nasal cannula  2  91   09/14/21 0915  --  --  --  --  nasal cannula  2  --   09/14/21 0838  99.2 (37.3)  68  20  108/60  nasal cannula  2  97   09/14/21 0623  98.4 (36.9)  65  --  104/62  nasal cannula  2  95   09/14/21 0427  97.6 (36.4)  60  20  101/57  nasal cannula  2  92   09/13/21 2351  97.2 (36.2)  63  20  98/58  nasal cannula  2  91   09/13/21 2102  --  --  --  --  nasal cannula   2  --   Device (Oxygen Therapy): . at 09/13/21 2102   09/13/21 2059  100 (37.8)  81  16  140/53  nasal cannula  2  97   09/13/21 1725  98.4 (36.9)  66  16  108/64  nasal cannula  2  92   09/13/21 1600  --  74  18  --  nasal cannula  2  93   09/13/21 1223  98.6 (37)  76  18  104/64  nasal cannula  2  92   09/13/21 0836  102.7 (39.3)Abnormal    89  18  112/66  nasal cannula  2  93   Temp: RN notified at 09/13/21 0836   09/13/21 0800  --  --  --  --  nasal cannula with ETCO2  2  --   09/13/21 0528  100.1 (37.8)   80  18  110/70  nasal cannula  2  93   Temp: selma Ye RN aware at 09/13/21 0528   09/13/21 0116  102.8 (39.3)Abnormal   106  20  101/72  nasal cannula  2  94   09/12/21 2150  101.9 (38.8)Abnormal   86  24  101/59  nasal cannula  2  96   09/12/21 2000  --  --  --  --  nasal cannula  2  --   09/12/21 1530                              Current Facility-Administered Medications   Medication Dose Route Frequency Provider Last Rate Last Admin   • acetaminophen (TYLENOL) tablet 650 mg  650 mg Oral Q4H PRN Sylvain Haley DO   650 mg at 09/14/21 1320   • ascorbic acid (VITAMIN C) tablet 500 mg  500 mg Oral Daily Sylvain Haley DO   500 mg at 09/14/21 0929   • dexamethasone (DECADRON) tablet 6 mg  6 mg Oral Daily With Breakfast Sylvain Haley DO   6 mg at 09/14/21 0928   • enoxaparin (LOVENOX) syringe 40 mg  40 mg Subcutaneous Q24H Sylvain Haley DO   40 mg at 09/14/21 0928   • famotidine (PEPCID) tablet 20 mg  20 mg Oral BID Sylvain Haley DO   20 mg at 09/14/21 0928   • guaiFENesin (MUCINEX) 12 hr tablet 1,200 mg  1,200 mg Oral Q12H Nghia Flowers MD   1,200 mg at 09/14/21 0929   • melatonin tablet 3 mg  3 mg Oral Nightly Sylvain Haley DO   3 mg at 09/13/21 2109   • ondansetron (ZOFRAN) injection 4 mg  4 mg Intravenous Q6H PRN Sylvain Haley DO   4 mg at 09/12/21 1823   • Pharmacy Consult - Remdesivir   Does not apply Continuous PRN Sylvain Haley DO       • remdesivir 100 mg in sodium chloride 0.9 % 270 mL IVPB (powder vial)  100 mg Intravenous Q24H Sylvain Haley DO   100 mg at 09/13/21 2109   • sodium chloride 0.9 % flush 10 mL  10 mL Intravenous PRN Maritza Owen APRN       • sodium chloride 0.9 % flush 10 mL  10 mL Intravenous PRN Maritza Owen APRN       • sodium chloride 0.9 % flush 10 mL  10 mL Intravenous Q12H Sylvain Haley DO   10 mL at 09/13/21 2109   • sodium chloride 0.9 % flush 10 mL  10 mL Intravenous PRN Sylvain Haley DO       • sodium chloride 0.9 % infusion  75 mL/hr Intravenous Continuous Sylvain Haley DO 75 mL/hr at 09/14/21 1320 75 mL/hr at 09/14/21 1320   • thiamine (VITAMIN B-1) tablet 100 mg  100 mg Oral Daily Sylvain Haley DO   100 mg at 09/14/21 0930   • zinc sulfate (ZINCATE) capsule 220 mg  220 mg Oral Daily Sylvain Haley, DO    220 mg at 09/14/21 0933

## 2021-09-14 NOTE — PLAN OF CARE
Problem: Adult Inpatient Plan of Care  Goal: Plan of Care Review  Outcome: Ongoing, Progressing  Flowsheets  Taken 9/14/2021 0322 by Shaneka Phillips, RN  Progress: no change  Outcome Summary: Pt up in the chair at the beginning of the shift. Encouraged patient to change positions. Pt resting throughout shift. Pt denies pain. Pt c/o soreness to chest when patient coughs. PRN PO Tylenol given for temp of 100 with good results. Pt is running Sinus shun/Sinus Rhythm/ Sinus Tach HR:  on tele throughout shift. Pt is on 2L of O2 per NC.  in place. IV Remdesivir given as ordered. IV fluids infusing. Pt up x adlib. Safety maintained. Call light within reach.   Taken 9/13/2021 1740 by Annmarie Laws, RN  Plan of Care Reviewed With: patient

## 2021-09-15 ENCOUNTER — APPOINTMENT (OUTPATIENT)
Dept: GENERAL RADIOLOGY | Facility: HOSPITAL | Age: 21
End: 2021-09-15

## 2021-09-15 LAB
ALBUMIN SERPL-MCNC: 3.4 G/DL (ref 3.5–5.2)
ALP SERPL-CCNC: 60 U/L (ref 39–117)
ALT SERPL W P-5'-P-CCNC: 15 U/L (ref 1–33)
AST SERPL-CCNC: 29 U/L (ref 1–32)
BILIRUB CONJ SERPL-MCNC: <0.2 MG/DL (ref 0–0.3)
BILIRUB INDIRECT SERPL-MCNC: ABNORMAL MG/DL
BILIRUB SERPL-MCNC: 0.3 MG/DL (ref 0–1.2)
CREAT SERPL-MCNC: 0.5 MG/DL (ref 0.57–1)
CRP SERPL-MCNC: 9.36 MG/DL (ref 0–0.5)
GFR SERPL CREATININE-BSD FRML MDRD: >150 ML/MIN/1.73
PROCALCITONIN SERPL-MCNC: 0.15 NG/ML (ref 0–0.25)
PROT SERPL-MCNC: 6.5 G/DL (ref 6–8.5)

## 2021-09-15 PROCEDURE — 80076 HEPATIC FUNCTION PANEL: CPT | Performed by: INTERNAL MEDICINE

## 2021-09-15 PROCEDURE — 94799 UNLISTED PULMONARY SVC/PX: CPT

## 2021-09-15 PROCEDURE — 25010000002 ENOXAPARIN PER 10 MG: Performed by: INTERNAL MEDICINE

## 2021-09-15 PROCEDURE — 94640 AIRWAY INHALATION TREATMENT: CPT

## 2021-09-15 PROCEDURE — 82565 ASSAY OF CREATININE: CPT | Performed by: INTERNAL MEDICINE

## 2021-09-15 PROCEDURE — 71045 X-RAY EXAM CHEST 1 VIEW: CPT

## 2021-09-15 PROCEDURE — 84145 PROCALCITONIN (PCT): CPT | Performed by: INTERNAL MEDICINE

## 2021-09-15 PROCEDURE — 63710000001 DEXAMETHASONE PER 0.25 MG: Performed by: INTERNAL MEDICINE

## 2021-09-15 PROCEDURE — 86140 C-REACTIVE PROTEIN: CPT | Performed by: INTERNAL MEDICINE

## 2021-09-15 RX ORDER — ALBUTEROL SULFATE 90 UG/1
2 AEROSOL, METERED RESPIRATORY (INHALATION)
Status: DISCONTINUED | OUTPATIENT
Start: 2021-09-15 | End: 2021-09-16 | Stop reason: HOSPADM

## 2021-09-15 RX ORDER — GUAIFENESIN/DEXTROMETHORPHAN 100-10MG/5
5 SYRUP ORAL EVERY 4 HOURS PRN
Status: DISCONTINUED | OUTPATIENT
Start: 2021-09-15 | End: 2021-09-16 | Stop reason: HOSPADM

## 2021-09-15 RX ADMIN — Medication 3 MG: at 21:39

## 2021-09-15 RX ADMIN — THIAMINE HCL TAB 100 MG 100 MG: 100 TAB at 08:45

## 2021-09-15 RX ADMIN — DEXAMETHASONE 6 MG: 4 TABLET ORAL at 08:45

## 2021-09-15 RX ADMIN — ALBUTEROL SULFATE 2 PUFF: 90 AEROSOL, METERED RESPIRATORY (INHALATION) at 21:21

## 2021-09-15 RX ADMIN — ALBUTEROL SULFATE 2 PUFF: 90 AEROSOL, METERED RESPIRATORY (INHALATION) at 07:24

## 2021-09-15 RX ADMIN — ACETAMINOPHEN 650 MG: 325 TABLET, FILM COATED ORAL at 06:11

## 2021-09-15 RX ADMIN — GUAIFENESIN 1200 MG: 600 TABLET, EXTENDED RELEASE ORAL at 21:38

## 2021-09-15 RX ADMIN — ALBUTEROL SULFATE 2 PUFF: 90 AEROSOL, METERED RESPIRATORY (INHALATION) at 10:49

## 2021-09-15 RX ADMIN — SODIUM CHLORIDE, PRESERVATIVE FREE 10 ML: 5 INJECTION INTRAVENOUS at 08:45

## 2021-09-15 RX ADMIN — ACETAMINOPHEN 650 MG: 325 TABLET, FILM COATED ORAL at 16:22

## 2021-09-15 RX ADMIN — GUAIFENESIN 1200 MG: 600 TABLET, EXTENDED RELEASE ORAL at 08:45

## 2021-09-15 RX ADMIN — SODIUM CHLORIDE, PRESERVATIVE FREE 10 ML: 5 INJECTION INTRAVENOUS at 21:40

## 2021-09-15 RX ADMIN — REMDESIVIR 100 MG: 100 INJECTION, POWDER, LYOPHILIZED, FOR SOLUTION INTRAVENOUS at 21:38

## 2021-09-15 RX ADMIN — FAMOTIDINE 20 MG: 20 TABLET, FILM COATED ORAL at 21:39

## 2021-09-15 RX ADMIN — ENOXAPARIN SODIUM 40 MG: 40 INJECTION SUBCUTANEOUS at 08:45

## 2021-09-15 RX ADMIN — ALBUTEROL SULFATE 2 PUFF: 90 AEROSOL, METERED RESPIRATORY (INHALATION) at 14:49

## 2021-09-15 RX ADMIN — ZINC SULFATE 220 MG (50 MG) CAPSULE 220 MG: CAPSULE at 08:45

## 2021-09-15 RX ADMIN — OXYCODONE HYDROCHLORIDE AND ACETAMINOPHEN 500 MG: 500 TABLET ORAL at 08:45

## 2021-09-15 RX ADMIN — FAMOTIDINE 20 MG: 20 TABLET, FILM COATED ORAL at 08:45

## 2021-09-15 NOTE — NURSING NOTE
Pt lying in bed with 1.5 L of 02 on at this time and saturation is 94%, pt does have a bad cough with yellow and greenish sputum. Bed is locked/low with rails x3 and call light in reach and no needs at this time. García Cotton RN 9/15/2021

## 2021-09-15 NOTE — PROGRESS NOTES
1           [facility] Medicine Services  INPATIENT PROGRESS NOTE    Patient Name: Lamar Rodriguez  Date of Admission: 9/12/2021  Today's Date: 09/15/21  Length of Stay: 3  Primary Care Physician: Provider, No Known    Subjective   Chief Complaint: Follow-up  HPI   Patient was able to be titrated to 1.5 L nasal cannula but had some coughing spell which she was able to cough of phlegm.  She desaturated during the process but after dialing her up to 3.5 L her O2 saturation went up to the 90s.  CRP noted to be decreasing so is procalcitonin.    COVID LABS:  Results From Last 14 Days   Lab Units 09/15/21  0500 09/12/21  0936   PROBNP pg/mL  --  210.4   CRP mg/dL 9.36* 16.16*   D DIMER QUANT mg/L (FEU)  --  1.75*   FERRITIN ng/mL  --  48.44   LACTATE mmol/L  --  1.5   LDH U/L  --  243*   PROCALCITONIN ng/mL 0.15 0.27*   TROPONIN T ng/mL  --  <0.010     Still has fever    She states that her family/household has Covid.  She also states she is feeling better although has developed a pleuritic chest discomfort lateral to the right scapula.  It hurts also when coughing    Review of Systems     All pertinent negatives and positives are as above. All other systems have been reviewed and are negative unless otherwise stated.     Objective    Temp:  [98.6 °F (37 °C)-101.5 °F (38.6 °C)] 98.6 °F (37 °C)  Heart Rate:  [] 101  Resp:  [16-18] 16  BP: ()/(60-70) 100/68  Physical Exam   She was working on incentive spirometry while I was there.  She is on her fifth attempt.  She reach 1000 funmilayo as I seen from afar  seated comfortably on recliner without any distress  On 2 L oxygen with O2 saturation in the low to mid 90s  She is 94% on 3LPM  Pleasant  No distress  Watching on her phone while laying comfortably on bed  GEN: Awake, alert, interactive,speaking in full sentenses  HEENT: EOMI, Anicteric, Trachea midline  Lungs: equal chest rise b/l, good respiratory effort, no accessory muscle  use  No gross point tenderness on scapular area  Heart: Regular rate and rhythm  ABD: Nondistended, flat  Extremities:  no cyanosis, no edema  Skin: no visible rashes or petechiae  Neuro: AAOx3, no focal deficits  Psych: normal mood & affect         Results Review:  I have reviewed the labs, radiology results, and diagnostic studies.    Laboratory Data:   Results from last 7 days   Lab Units 09/13/21  0455 09/12/21  0936   WBC 10*3/mm3 7.05 10.41   HEMOGLOBIN g/dL 11.2* 13.4   HEMATOCRIT % 34.1 40.1   PLATELETS 10*3/mm3 160 157        Results from last 7 days   Lab Units 09/15/21  0500 09/14/21  0521 09/13/21  0455 09/12/21  0936 09/12/21  0936   SODIUM mmol/L  --   --  141  --  137   POTASSIUM mmol/L  --   --  3.6  --  3.1*   CHLORIDE mmol/L  --   --  111*  --  103   CO2 mmol/L  --   --  20.0*  --  20.0*   BUN mg/dL  --   --  13  --  12   CREATININE mg/dL 0.50* 0.40* 0.60   < > 0.73   CALCIUM mg/dL  --   --  8.4*  --  8.8   BILIRUBIN mg/dL 0.3 0.2 0.3   < > 0.4   ALK PHOS U/L 60 62 70   < > 85   ALT (SGPT) U/L 15 13 12   < > 18   AST (SGOT) U/L 29 21 22   < > 29   GLUCOSE mg/dL  --   --  107*  --  118*    < > = values in this interval not displayed.       Culture Data:   Blood Culture   Date Value Ref Range Status   09/12/2021 No growth at 3 days  Preliminary   09/12/2021 No growth at 3 days  Preliminary       Radiology Data:   Imaging Results (Last 24 Hours)     ** No results found for the last 24 hours. **          I have reviewed the patient's current medications.     Assessment/Plan     Active Hospital Problems    Diagnosis    • **Acute hypoxemic respiratory failure due to COVID-19 (CMS/[facility])    • COVID-19 virus infection    • Hypokalemia    • Positive D dimer      Problem list    COVID-19 with pneumonia  Acute respiratory failure with hypoxia  Hypokalemia - -resolved  Elevated D-dimer likely secondary to COVID-19 without evidence of PE or DVT  Fever secondary to above - prn antipyretic  Pleurisy     Day 4  Remdesivir  Continue DVT prophylaxis  Encourage increase activities  O2 to maintain saturation between 90 to 94%  Encourage IS/flutter  Patient clinically doing better with exception that she still has fever needing antipyretic.  I reviewed cta of chest and cxr- Pneumonic process mostly on the right.  In fact her lung ct is mild compared to other COVID patients I have cared for.   Clinically she is doing better. Lab doing ok.  Fever still part and expected of viral infection. Trend is downward  procal is normal  She is on dvt prophylaxis  Will check another cxr.   Discussed plan of care        Discharge Planning:  tbd     Electronically signed by Nghia Flowers MD, 09/15/21, 13:45 CDT.

## 2021-09-15 NOTE — PLAN OF CARE
Problem: Adult Inpatient Plan of Care  Goal: Plan of Care Review  Outcome: Ongoing, Progressing  Goal: Patient-Specific Goal (Individualized)  Outcome: Ongoing, Progressing  Goal: Absence of Hospital-Acquired Illness or Injury  Outcome: Ongoing, Progressing  Intervention: Identify and Manage Fall Risk  Recent Flowsheet Documentation  Taken 9/14/2021 2300 by García Cotton RN  Safety Promotion/Fall Prevention: safety round/check completed  Taken 9/14/2021 2200 by García Cotton RN  Safety Promotion/Fall Prevention: safety round/check completed  Taken 9/14/2021 2112 by García Cotton RN  Safety Promotion/Fall Prevention: safety round/check completed  Taken 9/14/2021 2100 by García Cotton RN  Safety Promotion/Fall Prevention: safety round/check completed  Taken 9/14/2021 2000 by García Cotton RN  Safety Promotion/Fall Prevention: safety round/check completed  Intervention: Prevent Skin Injury  Recent Flowsheet Documentation  Taken 9/14/2021 2300 by García Cotton RN  Body Position: position changed independently  Taken 9/14/2021 2200 by García Cotton RN  Body Position: position changed independently  Taken 9/14/2021 2112 by García Cotton RN  Body Position: position changed independently  Taken 9/14/2021 2100 by García Cotton RN  Body Position: position changed independently  Taken 9/14/2021 2000 by García Cotton RN  Body Position: position changed independently  Goal: Optimal Comfort and Wellbeing  Outcome: Ongoing, Progressing  Goal: Readiness for Transition of Care  Outcome: Ongoing, Progressing     Problem: Gas Exchange Impaired  Goal: Optimal Gas Exchange  Outcome: Ongoing, Progressing  Intervention: Optimize Oxygenation and Ventilation  Recent Flowsheet Documentation  Taken 9/14/2021 2100 by García Cotton RN  Head of Bed (HOB): HOB at 15 degrees   Goal Outcome Evaluation:

## 2021-09-15 NOTE — PAYOR COMM NOTE
"9/15/21. [facility]  FABIO [phone]  [phone]      FAXING PROGRESS NOTES FROM 9/14/21. MD HAD NOT SEEN PATIENT YESTERDAY.    THANK YOU.  Lamar Parker (21 y.o. Female)     Date of Birth Social Security Number Address Home Phone MRN    2000  [address] [place] [place] [address] [phone] [id number]    Congregation Marital Status          Rastafari Single       Admission Date Admission Type Admitting Provider Attending Provider Department, Room/Bed    9/12/21 Emergency Nghia Flowers MD [place], Glenn Riego, MD [address], 442/1    Discharge Date Discharge Disposition Discharge Destination                       Attending Provider: Nghia Flowers MD    Allergies: No Known Allergies    Isolation: Enh Drop/Con   Infection: COVID (confirmed) (09/12/21)   Code Status: CPR    Ht: 177.8 cm (70\")   Wt: 68.1 kg (150 lb 3 oz)    Admission Cmt: None   Principal Problem: Acute hypoxemic respiratory failure due to COVID-19 (CMS/[facility]) [U07.1,J96.01]                 Active Insurance as of 9/12/2021     Primary Coverage     Payor Plan Insurance Group Employer/Plan Group    [employer]ET[employer] COMMERCIAL [facility] - ASA [id number]     Payor Plan Address Payor Plan Phone Number Payor Plan Fax Number Effective Dates    [address]   1/1/2016 - None Entered    [facility] [id number]       Subscriber Name Subscriber Birth Date Member ID       RENEE MONTIEL 4/18/1972 [id number]                 Emergency Contacts      (Rel.) Home Phone Work Phone Mobile Phone    Renee Montiel (Mother) [phone] -- --        [facility] Encounter Date/Time: 9/12/2021 0925   Hospital Account: [id number]    MRN: [id number]   Patient:  Lamar Montiel   Contact Serial #: [id number]   SSN:          ENCOUNTER             Patient Class: Inpatient   Unit: [address] Service: Medicine     Bed: 442/1   Admitting Provider: Nghia Flowers*   Referring " Physician:     Attending Provider: Nghia Flowers*   Adm Diagnosis: Acute hypoxemic respirat*               PATIENT          Name: Lamar Montiel : 2000 (21 yrs)   Address: [address] Sex: Female   City: [place] [address]   County: [facility]   Marital Status: SINGLE Ethnicity: NOT                                                                              Race: WHITE   Primary Care Provider: Provider, No Known Patients Phone: Home Phone: [phone]     Mobile Phone: [phone]   EMERGENCY CONTACT   Contact Name Legal Guardian? Relationship to Patient Home Phone Work Phone   1. Renee Montiel  2. *No Contact Specified* No    Mother    [phone]              GUARANTOR            Guarantor: Lamar Montiel     : 2000   Address: [address] Sex: Female     [place] [address]     Relation to Patient: Self       Home Phone: [phone]   Guarantor ID: [id number]       Work Phone:     GUARANTOR EMPLOYER   Employer: [employer]         Status: FULL TIME   COVERAGE          PRIMARY INSURANCE   Payor: [employer] COMMERCIAL Plan: [employer] - ASA   Group Number: [id number] Insurance Type: INDEMNITY   Subscriber Name: RENEE MONTIEL Subscriber : 1974   Subscriber ID: [id number] [place] [address]   Pat. Rel. to Subscriber: Child Coverage Phone:     SECONDARY INSURANCE   Payor: N/A Plan: N/A   Group Number:   Insurance Type:     Subscriber Name:   Subscriber :     Subscriber ID:   Coverage Address:     Pat. Rel. to Subscriber:   Coverage Phone:        Contact Serial # ([id number])  `       September 15, 2021    Chart ID ([id number])            Encounter Information     Department Encounter #   2021  9:25 AM  Pad 4b [id number]   Nghia Flowers MD   Physician   Hospitalist   Progress Notes      Signed   Date of Service:  21   Creation Time:  21            Signed             Show:Clear  all  [x]Manual[x]Template[x]Copied    Added by:  [x]Nghia Flowers MD    []Beckie for details       [facility] Medicine Services  INPATIENT PROGRESS NOTE     Patient Name: Lamar Rodriguez  Date of Admission: 9/12/2021  Today's Date: 09/14/21  Length of Stay: 2  Primary Care Physician: Provider, No Known     Subjective   Chief Complaint: Follow-up  HPI   Patient expressed that she is feeling better  Still has coughing spell particularly with deep inspiration  Reports to me that she is able to reach [address] after the first couple of try  Still has episodes of fever  Diarrhea resolved  No bowel movement today.           Review of Systems      All pertinent negatives and positives are as above. All other systems have been reviewed and are negative unless otherwise stated.      Objective    Temp:  [97.2 °F (36.2 °C)-101.2 °F (38.4 °C)] 98.6 °F (37 °C)  Heart Rate:  [60-86] 70  Resp:  [16-20] 18  BP: ()/(53-70) 110/70  Physical Exam  Seated comfortably on recliner without any distress  On 2 L oxygen with O2 saturation in the low to mid 90s  Pleasant  No distress  Watching on her phone while laying comfortably on bed  GEN: Awake, alert, interactive,speaking in full sentenses  HEENT: EOMI, Anicteric, Trachea midline  Lungs: equal chest rise b/l, good respiratory effort, no accessory muscle use  Heart: Regular rate and rhythm  ABD: Nondistended, flat  Extremities:  no cyanosis, no edema  Skin: no visible rashes or petechiae  Neuro: AAOx3, no focal deficits  Psych: normal mood & affect           Results Review:  I have reviewed the labs, radiology results, and diagnostic studies.     Laboratory Data:         Results from last 7 days   Lab Units 09/13/21  0455 09/12/21  0936   WBC 10*3/mm3 7.05 10.41   HEMOGLOBIN g/dL 11.2* 13.4   HEMATOCRIT % 34.1 40.1   PLATELETS 10*3/mm3 160 157                Results from last 7 days   Lab Units 09/14/21  0521 09/13/21  0455 09/12/21  0936    SODIUM mmol/L  --  141 137   POTASSIUM mmol/L  --  3.6 3.1*   CHLORIDE mmol/L  --  111* 103   CO2 mmol/L  --  20.0* 20.0*   BUN mg/dL  --  13 12   CREATININE mg/dL 0.40* 0.60 0.73   CALCIUM mg/dL  --  8.4* 8.8   BILIRUBIN mg/dL 0.2 0.3 0.4   ALK PHOS U/L 62 70 85   ALT (SGPT) U/L 13 12 18   AST (SGOT) U/L 21 22 29   GLUCOSE mg/dL  --  107* 118*         Culture Data:         Blood Culture   Date Value Ref Range Status   09/12/2021 No growth at 2 days   Preliminary   09/12/2021 No growth at 2 days   Preliminary         Radiology Data:       Imaging Results (Last 24 Hours)      ** No results found for the last 24 hours. **             I have reviewed the patient's current medications.      Assessment/Plan           Active Hospital Problems     Diagnosis     • **Acute hypoxemic respiratory failure due to COVID-19 (CMS/HCC)     • COVID-19 virus infection     • Hypokalemia     • Positive D dimer           COVID-19 with pneumonia  Acute respiratory failure with hypoxia  Hypokalemia  Elevated D-dimer likely secondary to COVID-19 without evidence of PE or DVT  Fever secondary to above        Day 3 Remdesivir  Continue dexamethasone plus famotidine and adjunct medications  Continue DVT prophylaxis  Encourage increase activities  O2 to maintain saturation between 90 to 94%  Encourage IS/flutter  Patient clinically doing better with exception that she still has fever needing antipyretic.  DC IV fluid              Discharge Planning: tbd     Electronically signed by Nghia Flowers MD, 09/14/21, 18:02 CDT.                ED to Hosp-Admission (Current) on 9/12/2021      Date/Time  Temp  Pulse  Resp  BP  Device (Oxygen Therapy)  Flow (L/min)  SpO2   09/15/21 0724  --  91  16  --  nasal cannula  3  91   09/15/21 0437  101.5 (38.6)Abnormal   80  16  120/62  nasal cannula  --  96   09/15/21 0003  99 (37.2)  67  16  120/64  nasal cannula  2  96   09/14/21 2112  99.5 (37.5)  81  16  102/64  nasal cannula  2  --   09/14/21  1615  98.6 (37)  70  18  110/70  nasal cannula   --  93   Device (Oxygen Therapy): down to 1.5L at 09/14/21 1615   09/14/21 1253  101.2 (38.4)Abnormal    86  18  106/62  nasal cannula  2  93   Temp: RN notified at 09/14     Contains abnormal data C-reactive Protein  Order: [id number]  Status:  Final result   Visible to patient:  Regla (scheduled for 9/15/2021  6:34 AM)   Next appt:  None  Specimen Information: Blood         0 Result Notes  Component   Ref Range & Units 05:00   C-Reactive Protein   0.00 - 0.50 mg/dL 9.36High                   0 Result Notes  Component   Ref Range & Units 05:00   Creatinine   0.57 - 1.00 mg/dL 0.50Low              Notes  Component   Ref Range & Units 05:00   Total Protein   6.0 - 8.5 g/dL 6.5    Albumin   3.50 - 5.20 g/dL 3.40Low     ALT (SGPT)   1 - 33 U/L 15    AST (SGOT)   1 - 32 U/L 29    Alkaline Phosphatase   39 - 117 U/L 60    Total Bilirubin   0.0 - 1.2 mg/dL 0.3    Bilirubin, Direct   0.0 - 0.3 mg/dL <0.2    Bilirubin, Indirect     Comment: Unable to calculate                   Current Facility-Administered Medications   Medication Dose Route Frequency Provider Last Rate Last Admin   • acetaminophen (TYLENOL) tablet 650 mg  650 mg Oral Q4H PRN Sylvain Haley DO   650 mg at 09/15/21 0611   • albuterol sulfate HFA (PROVENTIL HFA;VENTOLIN HFA;PROAIR HFA) inhaler 2 puff  2 puff Inhalation 4x Daily - RT Philip Parker MD   2 puff at 09/15/21 0724   • ascorbic acid (VITAMIN C) tablet 500 mg  500 mg Oral Daily Sylvain Haley DO   500 mg at 09/14/21 0929   • dexamethasone (DECADRON) tablet 6 mg  6 mg Oral Daily With Breakfast Sylvain Haley DO   6 mg at 09/14/21 0928   • enoxaparin (LOVENOX) syringe 40 mg  40 mg Subcutaneous Q24H Sylvain Haley DO   40 mg at 09/14/21 0928   • famotidine (PEPCID) tablet 20 mg  20 mg Oral BID Sylvain Haley DO   20 mg at 09/14/21 2121   • guaiFENesin (MUCINEX) 12 hr tablet 1,200 mg  1,200 mg Oral Q12H Nghia Flowers MD    1,200 mg at 09/14/21 2121   • guaiFENesin-dextromethorphan (ROBITUSSIN DM) 100-10 MG/5ML syrup 5 mL  5 mL Oral Q4H PRN Philip Parker MD       • melatonin tablet 3 mg  3 mg Oral Nightly Sylvain Haley DO   3 mg at 09/14/21 2121   • ondansetron (ZOFRAN) injection 4 mg  4 mg Intravenous Q6H PRN Sylvain Haley DO   4 mg at 09/12/21 1823   • Pharmacy Consult - Remdesivir   Does not apply Continuous PRN Sylvain Haley DO       • remdesivir 100 mg in sodium chloride 0.9 % 270 mL IVPB (powder vial)  100 mg Intravenous Q24H Sylvain Haley DO   100 mg at 09/14/21 2121   • sodium chloride 0.9 % flush 10 mL  10 mL Intravenous PRN Maritza Owen, APRN       • sodium chloride 0.9 % flush 10 mL  10 mL Intravenous PRN Maritza Owen APRN       • sodium chloride 0.9 % flush 10 mL  10 mL Intravenous Q12H Sylvain Haley DO   10 mL at 09/14/21 2127   • sodium chloride 0.9 % flush 10 mL  10 mL Intravenous PRN Sylvain Haley DO       • thiamine (VITAMIN B-1) tablet 100 mg  100 mg Oral Daily Sylvain Haley DO   100 mg at 09/14/21 0930   • zinc sulfate (ZINCATE) capsule 220 mg  220 mg Oral Daily Sylvain Haley DO   220 mg at 09/14/21 0929

## 2021-09-15 NOTE — NURSING NOTE
Pt 02 monitor was alarming, upon entering the room her, 02 was at 84% on 1.5 L . 02 was raised to 3.5 L at this time and now sje is at 90%, respiratory was called and the hospitalist. Pt cough is getting more congested. Stated it is getting hard to cough up the sputum. Awaiting a call back at time. García Cotton RN 9/15/2021    .

## 2021-09-16 ENCOUNTER — APPOINTMENT (OUTPATIENT)
Dept: PULMONOLOGY | Facility: HOSPITAL | Age: 21
End: 2021-09-16

## 2021-09-16 ENCOUNTER — READMISSION MANAGEMENT (OUTPATIENT)
Dept: CALL CENTER | Facility: HOSPITAL | Age: 21
End: 2021-09-16

## 2021-09-16 VITALS
SYSTOLIC BLOOD PRESSURE: 100 MMHG | BODY MASS INDEX: 21.51 KG/M2 | HEART RATE: 90 BPM | WEIGHT: 150.25 LBS | DIASTOLIC BLOOD PRESSURE: 62 MMHG | HEIGHT: 70 IN | RESPIRATION RATE: 16 BRPM | TEMPERATURE: 98.2 F | OXYGEN SATURATION: 91 %

## 2021-09-16 PROBLEM — D89.832 CYTOKINE RELEASE SYNDROME, GRADE 2: Status: ACTIVE | Noted: 2021-09-16

## 2021-09-16 LAB
ALBUMIN SERPL-MCNC: 3.4 G/DL (ref 3.5–5.2)
ALP SERPL-CCNC: 61 U/L (ref 39–117)
ALT SERPL W P-5'-P-CCNC: 18 U/L (ref 1–33)
AST SERPL-CCNC: 28 U/L (ref 1–32)
BILIRUB CONJ SERPL-MCNC: 0.2 MG/DL (ref 0–0.3)
BILIRUB INDIRECT SERPL-MCNC: 0.1 MG/DL
BILIRUB SERPL-MCNC: 0.3 MG/DL (ref 0–1.2)
CREAT SERPL-MCNC: 0.3 MG/DL (ref 0.57–1)
GFR SERPL CREATININE-BSD FRML MDRD: >150 ML/MIN/1.73
PROT SERPL-MCNC: 6.9 G/DL (ref 6–8.5)

## 2021-09-16 PROCEDURE — 25010000002 ENOXAPARIN PER 10 MG: Performed by: INTERNAL MEDICINE

## 2021-09-16 PROCEDURE — 36415 COLL VENOUS BLD VENIPUNCTURE: CPT | Performed by: INTERNAL MEDICINE

## 2021-09-16 PROCEDURE — 82565 ASSAY OF CREATININE: CPT | Performed by: INTERNAL MEDICINE

## 2021-09-16 PROCEDURE — 94799 UNLISTED PULMONARY SVC/PX: CPT

## 2021-09-16 PROCEDURE — 63710000001 DEXAMETHASONE PER 0.25 MG: Performed by: INTERNAL MEDICINE

## 2021-09-16 PROCEDURE — 80076 HEPATIC FUNCTION PANEL: CPT | Performed by: INTERNAL MEDICINE

## 2021-09-16 RX ORDER — ALBUTEROL SULFATE 90 UG/1
2 AEROSOL, METERED RESPIRATORY (INHALATION)
Start: 2021-09-16 | End: 2022-12-28

## 2021-09-16 RX ORDER — DEXAMETHASONE 6 MG/1
6 TABLET ORAL
Qty: 5 TABLET | Refills: 0 | Status: SHIPPED | OUTPATIENT
Start: 2021-09-17 | End: 2021-10-06 | Stop reason: HOSPADM

## 2021-09-16 RX ORDER — GUAIFENESIN 600 MG/1
1200 TABLET, EXTENDED RELEASE ORAL EVERY 12 HOURS SCHEDULED
Qty: 20 TABLET | Refills: 0 | Status: SHIPPED | OUTPATIENT
Start: 2021-09-16 | End: 2021-10-06 | Stop reason: HOSPADM

## 2021-09-16 RX ORDER — ZINC SULFATE 50(220)MG
220 CAPSULE ORAL DAILY
Qty: 5 CAPSULE | Refills: 0 | Status: SHIPPED | OUTPATIENT
Start: 2021-09-17 | End: 2022-12-28

## 2021-09-16 RX ORDER — ASCORBIC ACID 500 MG
500 TABLET ORAL DAILY
Qty: 5 TABLET | Refills: 0 | Status: SHIPPED | OUTPATIENT
Start: 2021-09-17 | End: 2022-12-28

## 2021-09-16 RX ORDER — FAMOTIDINE 20 MG/1
20 TABLET, FILM COATED ORAL 2 TIMES DAILY
Qty: 10 TABLET | Refills: 0 | Status: SHIPPED | OUTPATIENT
Start: 2021-09-16 | End: 2021-10-06 | Stop reason: HOSPADM

## 2021-09-16 RX ORDER — ACETAMINOPHEN 325 MG/1
650 TABLET ORAL EVERY 4 HOURS PRN
Qty: 30 TABLET | Refills: 0 | Status: SHIPPED | OUTPATIENT
Start: 2021-09-16 | End: 2022-12-28

## 2021-09-16 RX ORDER — LANOLIN ALCOHOL/MO/W.PET/CERES
3 CREAM (GRAM) TOPICAL NIGHTLY
Qty: 5 TABLET | Refills: 0 | Status: SHIPPED | OUTPATIENT
Start: 2021-09-16 | End: 2021-10-06 | Stop reason: HOSPADM

## 2021-09-16 RX ADMIN — GUAIFENESIN 1200 MG: 600 TABLET, EXTENDED RELEASE ORAL at 09:49

## 2021-09-16 RX ADMIN — ENOXAPARIN SODIUM 40 MG: 40 INJECTION SUBCUTANEOUS at 10:40

## 2021-09-16 RX ADMIN — ALBUTEROL SULFATE 2 PUFF: 90 AEROSOL, METERED RESPIRATORY (INHALATION) at 12:02

## 2021-09-16 RX ADMIN — ALBUTEROL SULFATE 2 PUFF: 90 AEROSOL, METERED RESPIRATORY (INHALATION) at 07:47

## 2021-09-16 RX ADMIN — REMDESIVIR 100 MG: 100 INJECTION, POWDER, LYOPHILIZED, FOR SOLUTION INTRAVENOUS at 14:55

## 2021-09-16 RX ADMIN — THIAMINE HCL TAB 100 MG 100 MG: 100 TAB at 09:50

## 2021-09-16 RX ADMIN — FAMOTIDINE 20 MG: 20 TABLET, FILM COATED ORAL at 09:50

## 2021-09-16 RX ADMIN — OXYCODONE HYDROCHLORIDE AND ACETAMINOPHEN 500 MG: 500 TABLET ORAL at 09:50

## 2021-09-16 RX ADMIN — ZINC SULFATE 220 MG (50 MG) CAPSULE 220 MG: CAPSULE at 09:50

## 2021-09-16 RX ADMIN — SODIUM CHLORIDE, PRESERVATIVE FREE 10 ML: 5 INJECTION INTRAVENOUS at 09:50

## 2021-09-16 RX ADMIN — ALBUTEROL SULFATE 2 PUFF: 90 AEROSOL, METERED RESPIRATORY (INHALATION) at 15:21

## 2021-09-16 RX ADMIN — DEXAMETHASONE 6 MG: 4 TABLET ORAL at 09:49

## 2021-09-16 RX ADMIN — ACETAMINOPHEN 650 MG: 325 TABLET, FILM COATED ORAL at 09:27

## 2021-09-16 NOTE — NURSING NOTE
Patient is up in the chair all day today. Keep encouraged to use IS and flutter valve. Oxygen titrated down to 2L, with her saturation ranging from 90-94%. Patient is feeling better this afternoon.

## 2021-09-16 NOTE — CASE MANAGEMENT/SOCIAL WORK
Continued Stay Note   Maya     Patient Name: Lamar Rodriguez  MRN: [id number]  Today's Date: 9/16/2021    Admit Date: 9/12/2021    Discharge Plan     Row Name 09/16/21 1522       Plan    Plan  Home with home 02    Provided Post Acute Provider List?  Yes    Post Acute Provider List  DME Supplier    Delivered To  Patient    Method of Delivery  Telephone    Patient/Family in Agreement with Plan  yes    Final Discharge Disposition Code  01 - home or self-care    Final Note  Referral for home 02.  Pt chose [facility].  SW faxed referral and they will bring portable to pt's room shortly.  SW also provided pt with home pulse ox.        Discharge Codes    No documentation.       Expected Discharge Date and Time     Expected Discharge Date Expected Discharge Time    Sep 16, 2021             FLO CasianoW

## 2021-09-16 NOTE — PROGRESS NOTES
Exercise Oximetry    Patient Name:Lamar Rodriguez   MRN: [id number]   Date: 09/16/21             ROOM AIR BASELINE   SpO2% 90   Heart Rate 86   Blood Pressure      EXERCISE ON ROOM AIR SpO2% EXERCISE ON O2 @  LPM SpO2%   1 MINUTE 87 1 MINUTE    2 MINUTES  2 MINUTES    3 MINUTES  3 MINUTES    4 MINUTES  4 MINUTES    5 MINUTES  5 MINUTES    6 MINUTES  6 MINUTES               Distance Walked  50 ft Distance Walked   Dyspnea (Hugo Scale)  1 Dyspnea (Hugo Scale)   Fatigue (Hugo Scale)   Fatigue (Hugo Scale)   SpO2% Post Exercise  91 SpO2% Post Exercise   HR Post Exercise   HR Post Exercise   Time to Recovery  1 Time to Recovery     Comments: Patient became dizzy and had to stop after 1 minute.

## 2021-09-16 NOTE — DISCHARGE SUMMARY
[facility] Medicine Services  DISCHARGE SUMMARY       Date of Admission: 9/12/2021  Date of Discharge:  9/16/2021  Primary Care Physician: Provider, No Known    Discharge Diagnoses:  Active Hospital Problems    Diagnosis    • **Acute hypoxemic respiratory failure due to COVID-19 (CMS/HCC)    • Cytokine release syndrome, grade 2    • COVID-19 virus infection    • Hypokalemia    • Positive D dimer          Presenting Problem/History of Present Illness:  Acute hypoxemic respiratory failure due to COVID-19 (CMS/HCC) [U07.1, J96.01]         Hospital Course  She is a 21-year-old woman with no significant past medical history presenting with shortness of breath.  Patient reported to have colds Thursday prior to her admission.  On the day of admission, she was reported to have more shortness of breath like having tightness in her chest relating this more of a problem getting deep breath.  Patient also had temperature of 101.6 the night prior to coming to the hospital.  Appetite has been poor.  When she arrived in the emergency room her O2 saturation was 85%.  She was tested positive for Covid.  She had an elevated D-dimer.  This was followed by a CT angiogram of the chest which showed no evidence of PE.  I reviewed the CT angiogram myself and was not quite impressed in terms of findings normally I have seen from more severe Covid.  Her chest x-ray reportedly clear.  Yesterday, she complained of pain medial to her right shoulder blade.  She denies any tenderness on palpation.  This is relieved by hot compress/aqua K pad.  She describes it as tightness.  She has no wheezing or crackles.  O2 saturation is in the low to mid 90s on 3 L which is similar to yesterday's setting.  Chest x-ray apparently showed increased bilateral groundglass infiltrate.  I reviewed the x-ray with her at bedside.  Noted that she also has decreased aeration bilateral compared to admit chest x-ray.  She had venous  duplex ultrasound also in this hospitalization that showed no evidence of DVT in either lower extremities.  We explored differentials regarding pleurisy.  Patient has pneumonia and has coughing spells which definitely could cause some pain as stated.  Patient has been ruled out from PE and DVT by imaging studies.  She has been on DVT prophylaxis since admission.  Her inflammatory marker is trending down.  She is clinically doing better but still requiring high oxygen.  She also has fever still which is readily addressed by acetaminophen.  I think this is something expected from her Covid infection.  Procalcitonin is normal at 0.15.  White count is normal.  Blood culture and strep pneumoniae antigen are normal.  At present time, I do not suspect that she has secondary bacterial infection  In reference to the hypoxia, we will do a pre and post exercise oxygen to determine her eligibility as well as how much she would need.  I noticed during our conversation with her mom over the phone in her room, she was not able to explain a whole lot to her mom as a verbal read back to our discussion while mom was listening.  I repeated my findings and discussed with mom while I was in her room.  Mom expressed adequate understanding.  She is agreeable for discharge.  Patient is felt medically stable and appropriate for discharge.  They were instructed to seek medical attention if any worsening condition.  If health department does not call her back (as she said she has not received a call from health department) I think she could be off isolation 14 days from same day (after September 26).  Otherwise, I will leave the quarantine duration recommendation per health department.      Procedures Performed:   None      Consults:   None      Pertinent Test Results:  Lab Results (all)     Procedure Component Value Units Date/Time    Blood Culture - Blood, Arm, Left [[id number]] Collected: 09/12/21 0937    Specimen: Blood from Arm, Left  "Updated: 09/16/21 1001     Blood Culture No growth at 4 days    Blood Culture - Blood, Arm, Left [[id number]] Collected: 09/12/21 0936    Specimen: Blood from Arm, Left Updated: 09/16/21 1001     Blood Culture No growth at 4 days    Hepatic Function Panel [[id number]]  (Abnormal) Collected: 09/16/21 0707    Specimen: Blood Updated: 09/16/21 0751     Total Protein 6.9 g/dL      Albumin 3.40 g/dL      ALT (SGPT) 18 U/L      AST (SGOT) 28 U/L      Alkaline Phosphatase 61 U/L      Total Bilirubin 0.3 mg/dL      Bilirubin, Direct 0.2 mg/dL      Bilirubin, Indirect 0.1 mg/dL     Creatinine, Serum [[id number]]  (Abnormal) Collected: 09/16/21 0532    Specimen: Blood Updated: 09/16/21 0622     Creatinine 0.30 mg/dL      eGFR Non African Amer >150 mL/min/1.73     Narrative:      GFR Normal >60  Chronic Kidney Disease <60  Kidney Failure <15      Procalcitonin [[id number]]  (Normal) Collected: 09/15/21 0500    Specimen: Blood Updated: 09/15/21 0638     Procalcitonin 0.15 ng/mL     Narrative:      As a Marker for Sepsis (Non-Neonates):     1. <0.5 ng/mL represents a low risk of severe sepsis and/or septic shock.  2. >2 ng/mL represents a high risk of severe sepsis and/or septic shock.    As a Marker for Lower Respiratory Tract Infections that require antibiotic therapy:  PCT on Admission     Antibiotic Therapy             6-12 Hrs later  >0.5                          Strongly Recommended            >0.25 - <0.5             Recommended  0.1 - 0.25                  Discouraged                       Remeasure/reassess PCT  <0.1                         Strongly Discouraged         Remeasure/reassess PCT      As 28 day mortality risk marker: \"Change in Procalcitonin Result\" (>80% or <=80%) if Day 0 (or Day 1) and Day 4 values are available. Refer to http://[email or web address]-pct-calculator.com/    Change in PCT <=80 %   A decrease of PCT levels below or equal to 80% defines a positive change in PCT test result representing a higher risk for " 28-day all-cause mortality of patients diagnosed with severe sepsis or septic shock.    Change in PCT >80 %   A decrease of PCT levels of more than 80% defines a negative change in PCT result representing a lower risk for 28-day all-cause mortality of patients diagnosed with severe sepsis or septic shock.                C-reactive Protein [[id number]]  (Abnormal) Collected: 09/15/21 0500    Specimen: Blood Updated: 09/15/21 0634     C-Reactive Protein 9.36 mg/dL     Creatinine, Serum [[id number]]  (Abnormal) Collected: 09/15/21 0500    Specimen: Blood Updated: 09/15/21 0634     Creatinine 0.50 mg/dL      eGFR Non African Amer >150 mL/min/1.73     Narrative:      GFR Normal >60  Chronic Kidney Disease <60  Kidney Failure <15      Hepatic Function Panel [[id number]]  (Abnormal) Collected: 09/15/21 0500    Specimen: Blood Updated: 09/15/21 0634     Total Protein 6.5 g/dL      Albumin 3.40 g/dL      ALT (SGPT) 15 U/L      AST (SGOT) 29 U/L      Alkaline Phosphatase 60 U/L      Total Bilirubin 0.3 mg/dL      Bilirubin, Direct <0.2 mg/dL      Bilirubin, Indirect --     Comment: Unable to calculate       Hepatic Function Panel [[id number]]  (Abnormal) Collected: 09/14/21 0521    Specimen: Blood Updated: 09/14/21 0628     Total Protein 6.4 g/dL      Albumin 3.20 g/dL      ALT (SGPT) 13 U/L      AST (SGOT) 21 U/L      Alkaline Phosphatase 62 U/L      Total Bilirubin 0.2 mg/dL      Bilirubin, Direct <0.2 mg/dL      Bilirubin, Indirect --     Comment: Unable to calculate       Creatinine, Serum [[id number]]  (Abnormal) Collected: 09/14/21 0521    Specimen: Blood Updated: 09/14/21 0628     Creatinine 0.40 mg/dL      eGFR Non African Amer >150 mL/min/1.73     Narrative:      GFR Normal >60  Chronic Kidney Disease <60  Kidney Failure <15      Comprehensive Metabolic Panel [[id number]]  (Abnormal) Collected: 09/13/21 0455    Specimen: Blood Updated: 09/13/21 0614     Glucose 107 mg/dL      BUN 13 mg/dL      Creatinine 0.60 mg/dL       Sodium 141 mmol/L      Potassium 3.6 mmol/L      Chloride 111 mmol/L      CO2 20.0 mmol/L      Calcium 8.4 mg/dL      Total Protein 6.6 g/dL      Albumin 3.50 g/dL      ALT (SGPT) 12 U/L      AST (SGOT) 22 U/L      Alkaline Phosphatase 70 U/L      Total Bilirubin 0.3 mg/dL      eGFR Non African Amer 124 mL/min/1.73      Globulin 3.1 gm/dL      A/G Ratio 1.1 g/dL      BUN/Creatinine Ratio 21.7     Anion Gap 10.0 mmol/L     Narrative:      GFR Normal >60  Chronic Kidney Disease <60  Kidney Failure <15      Manual Differential [[id number]]  (Abnormal) Collected: 09/13/21 0455    Specimen: Blood Updated: 09/13/21 0559     Neutrophil % 90.0 %      Lymphocyte % 8.0 %      Monocyte % 1.0 %      Atypical Lymphocyte % 1.0 %      Neutrophils Absolute 6.35 10*3/mm3      Lymphocytes Absolute 0.63 10*3/mm3      Monocytes Absolute 0.07 10*3/mm3      Anisocytosis Slight/1+     Microcytes Slight/1+     WBC Morphology Normal     Platelet Morphology Normal    Bilirubin, Direct [[id number]]  (Normal) Collected: 09/13/21 0455    Specimen: Blood Updated: 09/13/21 0557     Bilirubin, Direct <0.2 mg/dL     Magnesium [[id number]]  (Normal) Collected: 09/13/21 0455    Specimen: Blood Updated: 09/13/21 0552     Magnesium 1.9 mg/dL     CBC & Differential [[id number]]  (Abnormal) Collected: 09/13/21 0455    Specimen: Blood Updated: 09/13/21 0537    Narrative:      The following orders were created for panel order CBC & Differential.  Procedure                               Abnormality         Status                     ---------                               -----------         ------                     CBC Auto Differential[[id number]]        Abnormal            Final result                 Please view results for these tests on the individual orders.    CBC Auto Differential [[id number]]  (Abnormal) Collected: 09/13/21 0455    Specimen: Blood Updated: 09/13/21 0537     WBC 7.05 10*3/mm3      RBC 4.16 10*6/mm3      Hemoglobin 11.2 g/dL       Hematocrit 34.1 %      MCV 82.0 fL      MCH 26.9 pg      MCHC 32.8 g/dL      RDW 14.6 %      RDW-SD 44.0 fl      MPV 11.9 fL      Platelets 160 10*3/mm3     S. Pneumo Ag Urine or CSF - Urine, Urine, Clean Catch [[id number]]  (Normal) Collected: 09/12/21 1628    Specimen: Urine, Clean Catch Updated: 09/12/21 1705     Strep Pneumo Ag Negative    Pregnancy, Urine - Urine, Clean Catch [[id number]]  (Normal) Collected: 09/12/21 1628    Specimen: Urine, Clean Catch Updated: 09/12/21 1639     HCG, Urine QL Negative    hCG, Serum, Qualitative [[id number]]  (Normal) Collected: 09/12/21 0936    Specimen: Blood Updated: 09/12/21 1324     HCG Qualitative Negative    Wendel Draw [[id number]] Collected: 09/12/21 0936    Specimen: Blood Updated: 09/12/21 1045    Narrative:      The following orders were created for panel order Wendel Draw.  Procedure                               Abnormality         Status                     ---------                               -----------         ------                     Green Top (Gel)[[id number]]                                  Final result               Lavender Top[[id number]]                                     Final result               Red Top[[id number]]                                          Final result               Light Blue Top[[id number]]                                   Final result                 Please view results for these tests on the individual orders.    Green Top (Gel) [[id number]] Collected: 09/12/21 0936    Specimen: Blood Updated: 09/12/21 1045     Extra Tube Hold for add-ons.     Comment: Auto resulted.       Red Top [[id number]] Collected: 09/12/21 0936    Specimen: Blood Updated: 09/12/21 1045     Extra Tube Hold for add-ons.     Comment: Auto resulted.       Lavender Top [[id number]] Collected: 09/12/21 0936    Specimen: Blood Updated: 09/12/21 1045     Extra Tube hold for add-on     Comment: Auto resulted       Light Blue Top [[id number]] Collected: 09/12/21 0936  "   Specimen: Blood Updated: 09/12/21 1045     Extra Tube hold for add-on     Comment: Auto resulted       Procalcitonin [[id number]]  (Abnormal) Collected: 09/12/21 0936    Specimen: Blood Updated: 09/12/21 1033     Procalcitonin 0.27 ng/mL     Narrative:      As a Marker for Sepsis (Non-Neonates):     1. <0.5 ng/mL represents a low risk of severe sepsis and/or septic shock.  2. >2 ng/mL represents a high risk of severe sepsis and/or septic shock.    As a Marker for Lower Respiratory Tract Infections that require antibiotic therapy:  PCT on Admission     Antibiotic Therapy             6-12 Hrs later  >0.5                          Strongly Recommended            >0.25 - <0.5             Recommended  0.1 - 0.25                  Discouraged                       Remeasure/reassess PCT  <0.1                         Strongly Discouraged         Remeasure/reassess PCT      As 28 day mortality risk marker: \"Change in Procalcitonin Result\" (>80% or <=80%) if Day 0 (or Day 1) and Day 4 values are available. Refer to http://www.[employer]-pct-calculator.com/    Change in PCT <=80 %   A decrease of PCT levels below or equal to 80% defines a positive change in PCT test result representing a higher risk for 28-day all-cause mortality of patients diagnosed with severe sepsis or septic shock.    Change in PCT >80 %   A decrease of PCT levels of more than 80% defines a negative change in PCT result representing a lower risk for 28-day all-cause mortality of patients diagnosed with severe sepsis or septic shock.                Ferritin [[id number]]  (Normal) Collected: 09/12/21 0936    Specimen: Blood Updated: 09/12/21 1033     Ferritin 48.44 ng/mL     Narrative:      Results may be falsely decreased if patient taking Biotin.      C-reactive Protein [[id number]]  (Abnormal) Collected: 09/12/21 0936    Specimen: Blood Updated: 09/12/21 1029     C-Reactive Protein 16.16 mg/dL     Comprehensive Metabolic Panel [[id number]]  (Abnormal) " Collected: 09/12/21 0936    Specimen: Blood Updated: 09/12/21 1029     Glucose 118 mg/dL      BUN 12 mg/dL      Creatinine 0.73 mg/dL      Sodium 137 mmol/L      Potassium 3.1 mmol/L      Chloride 103 mmol/L      CO2 20.0 mmol/L      Calcium 8.8 mg/dL      Total Protein 7.9 g/dL      Albumin 4.10 g/dL      ALT (SGPT) 18 U/L      AST (SGOT) 29 U/L      Alkaline Phosphatase 85 U/L      Total Bilirubin 0.4 mg/dL      eGFR Non African Amer 99 mL/min/1.73      Globulin 3.8 gm/dL      A/G Ratio 1.1 g/dL      BUN/Creatinine Ratio 16.4     Anion Gap 14.0 mmol/L     Narrative:      GFR Normal >60  Chronic Kidney Disease <60  Kidney Failure <15      Lactate Dehydrogenase [[id number]]  (Abnormal) Collected: 09/12/21 0936    Specimen: Blood Updated: 09/12/21 1028      U/L     Troponin [[id number]]  (Normal) Collected: 09/12/21 0936    Specimen: Blood Updated: 09/12/21 1027     Troponin T <0.010 ng/mL     Narrative:      Troponin T Reference Range:  <= 0.03 ng/mL-   Negative for AMI  >0.03 ng/mL-     Abnormal for myocardial necrosis.  Clinicians would have to utilize clinical acumen, EKG, Troponin and serial changes to determine if it is an Acute Myocardial Infarction or myocardial injury due to an underlying chronic condition.       Results may be falsely decreased if patient taking Biotin.      BNP [[id number]]  (Normal) Collected: 09/12/21 0936    Specimen: Blood Updated: 09/12/21 1026     proBNP 210.4 pg/mL     Narrative:      Among patients with dyspnea, NT-proBNP is highly sensitive for the detection of acute congestive heart failure. In addition NT-proBNP of <300 pg/ml effectively rules out acute congestive heart failure with 99% negative predictive value.    Results may be falsely decreased if patient taking Biotin.      COVID-19,[facility],Nasal Swab No Transport Media 3-4 HR TAT - Swab, Nasal Cavity [[id number]]  (Abnormal) Collected: 09/12/21 0939    Specimen: Swab from Nasal Cavity Updated: 09/12/21 1025      COVID19 Detected    Narrative:      Fact sheet for providers: https://www.fda.gov/media/880542/download     Fact sheet for patients: https://www.fda.gov/media/623148/download    Test performed by PCR.    Consider negative results in combination with clinical observations, patient history, and epidemiological information.    Lipase [[id number]]  (Normal) Collected: 09/12/21 0936    Specimen: Blood Updated: 09/12/21 1024     Lipase 42 U/L     Lactic Acid, Plasma [[id number]]  (Normal) Collected: 09/12/21 0936    Specimen: Blood Updated: 09/12/21 1011     Lactate 1.5 mmol/L     D-dimer, Quantitative [[id number]]  (Abnormal) Collected: 09/12/21 0936    Specimen: Blood Updated: 09/12/21 1006     D-Dimer, Quantitative 1.75 mg/L (FEU)     Narrative:      Reference Range is 0-0.50 mg/L FEU. However, results <0.50 mg/L FEU tends to rule out DVT or PE. Results >0.50 mg/L FEU are not useful in predicting absence or presence of DVT or PE.      CBC & Differential [[id number]]  (Abnormal) Collected: 09/12/21 0936    Specimen: Blood Updated: 09/12/21 1003    Narrative:      The following orders were created for panel order CBC & Differential.  Procedure                               Abnormality         Status                     ---------                               -----------         ------                     CBC Auto Differential[[id number]]        Abnormal            Final result                 Please view results for these tests on the individual orders.    CBC Auto Differential [[id number]]  (Abnormal) Collected: 09/12/21 0936    Specimen: Blood Updated: 09/12/21 1003     WBC 10.41 10*3/mm3      RBC 4.88 10*6/mm3      Hemoglobin 13.4 g/dL      Hematocrit 40.1 %      MCV 82.2 fL      MCH 27.5 pg      MCHC 33.4 g/dL      RDW 14.5 %      RDW-SD 42.7 fl      MPV 11.7 fL      Platelets 157 10*3/mm3      Neutrophil % 82.6 %      Lymphocyte % 13.4 %      Monocyte % 3.2 %      Eosinophil % 0.1 %      Basophil % 0.2 %      Immature  Grans % 0.5 %      Neutrophils, Absolute 8.60 10*3/mm3      Lymphocytes, Absolute 1.40 10*3/mm3      Monocytes, Absolute 0.33 10*3/mm3      Eosinophils, Absolute 0.01 10*3/mm3      Basophils, Absolute 0.02 10*3/mm3      Immature Grans, Absolute 0.05 10*3/mm3      nRBC 0.0 /100 WBC     Blood Gas, Arterial - [[id number]]  (Abnormal) Collected: 09/12/21 0942    Specimen: Arterial Blood Updated: 09/12/21 0940     Site Right Brachial     Agusto's Test N/A     pH, Arterial 7.427 pH units      pCO2, Arterial 29.8 mm Hg      Comment: 84 Value below reference range        pO2, Arterial 58.5 mm Hg      Comment: 84 Value below reference range        HCO3, Arterial 19.6 mmol/L      Comment: 84 Value below reference range        Base Excess, Arterial -3.6 mmol/L      Comment: 84 Value below reference range        O2 Saturation, Arterial 93.0 %      Comment: 84 Value below reference range        Temperature 37.0 C      Barometric Pressure for Blood Gas 755 mmHg      Modality Room Air     Ventilator Mode NA     Collected by [id number]     Comment: Meter: [id number]     :  [id number]        pCO2, Temperature Corrected 29.8 mm Hg      pH, Temp Corrected 7.427 pH Units      pO2, Temperature Corrected 58.5 mm Hg         Imaging Results (All)     Procedure Component Value Units Date/Time    XR Chest 1 View [[id number]] Collected: 09/15/21 1442     Updated: 09/15/21 1446    Narrative:      EXAM: XR CHEST 1 VW- 9/15/2021 2:25 PM CDT     HISTORY: hypoxia, pleurisy; covid 19; U07.1-COVID-19; J96.01-Acute  respiratory failure with hypoxia       COMPARISON: September 12, 2021.     TECHNIQUE: Frontal radiograph of the chest     FINDINGS:   Increase in bilateral groundglass infiltrates compared to September 12, 2021.. Cardiac silhouettes normal. Cardiac monitoring leads are  present..      The osseous structures and surrounding soft tissues demonstrate no acute  abnormality.          Impression:      1. Increase in bilateral groundglass  infiltrates consistent with  progression of pneumonia compared to September 12, 2021.        This report was finalized on 09/15/2021 14:43 by Dr. Joselo Wright MD.    US Venous Doppler Lower Extremity Bilateral (duplex) [[id number]] Collected: 09/13/21 1138     Updated: 09/13/21 1142    Narrative:      History: Swelling       Impression:      Impression: There is no evidence of deep venous thrombosis in either  lower extremity.     Comments: Bilateral lower extremity venous duplex exam was performed  using color Doppler flow, Doppler waveform analysis, and grayscale  imaging, with and without compression. There is no evidence of deep  venous thrombosis in the common femoral, popliteal, peroneal, and  posterior tibial veins bilaterally.         This report was finalized on 09/13/2021 11:39 by Dr. Michael Castrejon MD.    CT Angiogram Chest [[id number]] Collected: 09/12/21 1146     Updated: 09/12/21 1153    Narrative:      CT ANGIOGRAM CHEST- 9/12/2021 11:25 AM CDT      HISTORY: O2 sat 85%, recent pregnancy, evaluate for PE      COMPARISON: None.      DOSE LENGTH PRODUCT: 242 mGy cm. Automated exposure control was also  utilized to decrease patient radiation dose.     TECHNIQUE: Helical tomographic images of the chest were obtained after  the administration of intravenous contrast following angiogram protocol.  Additionally, 3D and multiplanar reformatted images were provided.        FINDINGS:    Pulmonary arteries: There is adequate enhancement of the pulmonary  arteries to evaluate for central and segmental pulmonary emboli. There  are no filling defects within the main, lobar, segmental or visualized  subsegmental pulmonary arteries. The pulmonary arteries are within  normal limits for size.      Aorta and great vessels: The aorta is well opacified and demonstrates no  dissection or aneurysm. The great vessels are normal in appearance.     Visualized neck base: The imaged portion of the base of the neck  "appears  unremarkable.      Lungs: Patchy nodular infiltrates are present in the right upper lobe  and right lower lobe. There is consolidation in the medial segment right  middle lobe. This may be pneumonia.. The trachea and bronchial tree are  patent.      Heart: The heart is normal in size. There is no pericardial effusion.      Mediastinum and lymph nodes: No enlarged mediastinal, hilar, or axillary  lymph nodes are present.      Skeletal and soft tissues: The osseous structures of the thorax and  surrounding soft tissues demonstrate no acute process.     Upper abdomen: The imaged portion of the upper abdomen demonstrates no  acute process.        Impression:      1. There is no evidence of embolic disease.        2. Patchy nodular infiltrates are present. Most likely this is  pneumonia. Another differential consideration would be septic emboli..        This report was finalized on 09/12/2021 11:50 by Dr. Joselo Wright MD.    XR Chest AP [[id number]] Collected: 09/12/21 1020     Updated: 09/12/21 1023    Narrative:      EXAM: XR CHEST AP- 9/12/2021 9:44 AM CDT     HISTORY: COVID Evaluation, Cough, Fever       COMPARISON: None.     TECHNIQUE: Frontal radiograph of the chest     FINDINGS:   The lungs are clear. The cardiomediastinal silhouette and pulmonary  vascularity are within normal limits.      The osseous structures and surrounding soft tissues demonstrate no acute  abnormality.          Impression:      1. No radiographic evidence of acute cardiopulmonary process.        This report was finalized on 09/12/2021 10:20 by Dr. Joselo Wright MD.          Condition on Discharge:    Stable  Physical Exam on Discharge:  BP 98/52 (BP Location: Left arm, Patient Position: Sitting)   Pulse 78   Temp 99.4 °F (37.4 °C) (Oral)   Resp 18   Ht 177.8 cm (70\")   Wt 68.2 kg (150 lb 4 oz)   LMP 08/22/2021 (Approximate)   SpO2 93%   BMI 21.56 kg/m²   Physical Exam   Seated comfortably on recliner  No distress speaks " in full sentence and no acc muscle use  On 3LPM O2 sat at 92-94%  GEN: Awake, alert, interactive, in NAD  HEENT: Atraumatic, PERRLA, EOMI, Anicteric, Trachea midline  No point tenderness medial to right scapula  Lungs: CTAB, no wheezing/rales/rhonchi  Heart: RRR, +S1/s2, no rub  ABD: soft, nt/nd, +BS, no guarding/rebound  Extremities: atraumatic, no cyanosis, no edema  Skin: no rashes or lesions  Neuro: AAOx3, no focal deficits  Psych: normal mood & affect      Discharge Disposition:  Home or Self Care    Discharge Medications:     Discharge Medications      New Medications      Instructions Start Date   acetaminophen 325 MG tablet  Commonly known as: TYLENOL   650 mg, Oral, Every 4 Hours PRN      albuterol sulfate  (90 Base) MCG/ACT inhaler  Commonly known as: PROVENTIL HFA;VENTOLIN HFA;PROAIR HFA   2 puffs, Inhalation, 4 Times Daily - RT      ascorbic acid 500 MG tablet  Commonly known as: VITAMIN C   500 mg, Oral, Daily   Start Date: September 17, 2021     dexamethasone 6 MG tablet  Commonly known as: DECADRON   6 mg, Oral, Daily With Breakfast   Start Date: September 17, 2021     famotidine 20 MG tablet  Commonly known as: PEPCID   20 mg, Oral, 2 Times Daily      guaiFENesin 600 MG 12 hr tablet  Commonly known as: MUCINEX   1,200 mg, Oral, Every 12 Hours Scheduled      melatonin 3 MG tablet   3 mg, Oral, Nightly      thiamine 100 MG tablet  tablet  Commonly known as: VITAMIN B-1   100 mg, Oral, Daily   Start Date: September 17, 2021     zinc sulfate 220 (50 Zn) MG capsule  Commonly known as: ZINCATE   220 mg, Oral, Daily   Start Date: September 17, 2021            Discharge Diet:   Diet Instructions     Diet: Regular      Discharge Diet: Regular          Discharge Care Plan / Instructions:   Continue on incentive spirometry/flutter valve  Seek medical attention if any worsening condition    Activity at Discharge:   Activity Instructions     Discharge Activity      Gradually resume activities    Gradually  Increase Activity Until at Pre-Hospitalization Level            Follow-up Appointments:   Primary care provider within 1 week  Test Results Pending at Discharge:   Pending Labs     Order Current Status    Blood Culture - Blood, Arm, Left Preliminary result    Blood Culture - Blood, Arm, Left Preliminary result           Electronically signed by Nghia Flowers MD, 9/16/2021, 13:56 CDT.    Time: > 30 mins        Part of this note may be an electronic transcription/translation of spoken language to printed text using the Dragon Dictation System.

## 2021-09-16 NOTE — NURSING NOTE
Pt resting at this time and while awake using her IS and can get to 1300 on it. Teaching was done on resting 02 and why it drops. Also with covid to lie prone to help relieve lung pressure. Pt did not run a temp last night. Call light in reach, bed locked/low with 02 @ 2L and 93%. García Cotton RN  9/16/2021

## 2021-09-16 NOTE — DISCHARGE PLACEMENT REQUEST
"From:  Christine Gibbons, ZEV  [phone]    Lamar Montiel (21 y.o. Female)     Date of Birth Social Security Number Address Home Phone MRN    2000  [address] [place] [address] [phone] [id number]    Hindu Marital Status          Restorationism Single       Admission Date Admission Type Admitting Provider Attending Provider Department, Room/Bed    9/12/21 Emergency Nghia Flowers MD Puertollano, Glenn Riego, MD [address], 442/1    Discharge Date Discharge Disposition Discharge Destination         Home or Self Care              Attending Provider: Nghia Flowers MD    Allergies: No Known Allergies    Isolation: Enh Drop/Con   Infection: COVID (confirmed) (09/12/21)   Code Status: CPR    Ht: 177.8 cm (70\")   Wt: 68.2 kg (150 lb 4 oz)    Admission Cmt: None   Principal Problem: Acute hypoxemic respiratory failure due to COVID-19 (CMS/[facility]) [U07.1,J96.01]                 Active Insurance as of 9/12/2021     Primary Coverage     Payor Plan Insurance Group Employer/Plan Group    [employer] - ASA [id number]     Payor Plan Address Payor Plan Phone Number Payor Plan Fax Number Effective Dates    [address]   1/1/2016 - None Entered    [facility] [id number]       Subscriber Name Subscriber Birth Date Member ID       RENEE MONTIEL 4/18/1972 [id number]                 Emergency Contacts      (Rel.) Home Phone Work Phone Mobile Phone    Renee Montiel (Mother) [phone] -- --        Oxygen Therapy [EQ60] (Order [id number])  Order  Date: 9/16/2021 Department: [address] Ordering/Authorizing: Nghia Flowers MD   Order History  Outpatient  Date/Time Action Taken User Additional Information   09/16/21 1519 Tess Willson, RN Ordering Mode: Telephone with readback   Order Details    Frequency Duration Priority Order Class   None None Routine External   Start Date/Time    Start Date   09/16/21   Order Information    Order Date Service " Start Date Start Time   09/16/21 Medicine 09/16/21    Reference Links    Associated Reports   View Encounter   Order Questions    Question Answer Comment   Delivery Modality Nasal Cannula    Liters Per Minute: 2    Duration: Continuous    Equipment  Oxygen Concentrator &  &  Portable Gaseous Oxygen System & Portable Oxygen Contents Gaseous &  Conserving Regulator    Length of Need (99 Months = Lifetime) 99 Months = Lifetime    Note:  Custom values to enter length of need for DME          Verbal Order Info    Action Created on Order Mode Entered by Responsible Provider Signed by Signed on   Ordering 09/16/21 1519 Telephone with readback Tess Andrea RN Puertollano, Glenn Riego, MD     Source Order Set / Preference List    Preference List   AMB SUPPLIES [1416]   Clinical Indications     ICD-10-CM ICD-9-CM   Acute hypoxemic respiratory failure due to COVID-19 (CMS/[facility])  - Primary     U07.1  J96.01 518.81  079.89  799.02   Reprint Order Requisition    Oxygen Therapy (Order #[id number]) on 9/16/21   Encounter    View Encounter          Order Provider Info        Office phone Pager E-mail   Ordering User Tess Andrea RN -- -- --   Authorizing Provider Nhgia Flowers [phone] -- --   Attending Provider Nghia Flowers [phone] -- --   Entered By Tess Andrea RN -- -- --   Ordering Provider Nghia Flowers [phone] -- --   Linked Charges    No charges linked to this procedure   Tracking Reports  Cosign Tracking Order Transmittal Tracking   Authorized by:  Nghia Flowers MD  (NPI: [id number])       Lab Component SmartPhrase Guide    Oxygen Therapy (Order #[id number]) on 9/16/21            Emergency Department Notes      Maritza Owen APRN at 09/12/21 0932     Attestation signed by Dwight Mccarty Jr., MD at 09/12/21 1250          For this patient encounter, I reviewed the NP or PA documentation, treatment plan, and medical  decision making. Dwight Mccarty Jr, MD 9/12/2021 12:50 CDT                  Subjective   Patient is a pleasant 21-year-old female who presents the ER today with complaint of chest pain and shortness of breath.  The patient reports that her symptoms began approximately 2 days ago.  She states that she has not felt well and had a temperature up to 101.6 last evening.  She reports nonproductive cough.  She states that she has a heavy feeling and pressure in her mid chest.  The patient is not vaccinated for COVID-19.  She does work in the public and works at [employer].  She denies any known exposures.  The patient initially was seen in urgent care and had an O2 saturation of 88% on room air.  She denies any history of asthma, or COPD.  She does not smoke.  She presents the ER today for further evaluation.  Her initial O2 saturation here was 85% on room air.      History provided by:  Patient   used: No    Shortness of Breath  Severity:  Moderate  Onset quality:  Sudden  Duration:  2 days  Timing:  Constant  Progression:  Worsening  Chronicity:  New  Context: not activity, not animal exposure, not emotional upset, not fumes, not known allergens, not occupational exposure, not pollens, not smoke exposure, not strong odors, not URI and not weather changes    Relieved by:  Nothing  Worsened by:  Nothing  Ineffective treatments:  None tried  Associated symptoms: chest pain, cough and fever    Associated symptoms: no abdominal pain, no claudication, no diaphoresis, no ear pain, no headaches, no hemoptysis, no neck pain, no PND, no rash, no sore throat, no sputum production, no syncope, no swollen glands, no vomiting and no wheezing    Risk factors: no recent alcohol use, no family hx of DVT, no hx of cancer, no hx of PE/DVT, no obesity, no oral contraceptive use, no prolonged immobilization, no recent surgery and no tobacco use        Review of Systems   Constitutional: Positive for fever. Negative for  diaphoresis.   HENT: Negative for ear pain and sore throat.    Respiratory: Positive for cough and shortness of breath. Negative for hemoptysis, sputum production and wheezing.    Cardiovascular: Positive for chest pain. Negative for claudication, syncope and PND.   Gastrointestinal: Negative for abdominal pain and vomiting.   Musculoskeletal: Negative for neck pain.   Skin: Negative for rash.   Neurological: Negative for headaches.   All other systems reviewed and are negative.      Past Medical History:   Diagnosis Date   • Chlamydia     prior to pregnancy       No Known Allergies    Past Surgical History:   Procedure Laterality Date   • MOUTH SURGERY         Family History   Problem Relation Age of Onset   • Prostate cancer Maternal Grandfather        Social History     Socioeconomic History   • Marital status: Single     Spouse name: Not on file   • Number of children: Not on file   • Years of education: Not on file   • Highest education level: Not on file   Tobacco Use   • Smoking status: Never Smoker   • Smokeless tobacco: Never Used   Substance and Sexual Activity   • Alcohol use: No   • Drug use: No   • Sexual activity: Yes     Partners: Male           Objective   Physical Exam  Vitals and nursing note reviewed.   Constitutional:       Appearance: She is well-developed.   HENT:      Head: Normocephalic and atraumatic.      Mouth/Throat:      Pharynx: Oropharynx is clear.   Eyes:      Conjunctiva/sclera: Conjunctivae normal.   Cardiovascular:      Rate and Rhythm: Normal rate and regular rhythm.   Pulmonary:      Effort: Tachypnea present.      Breath sounds: Examination of the right-upper field reveals decreased breath sounds. Examination of the left-upper field reveals decreased breath sounds. Examination of the right-middle field reveals decreased breath sounds. Examination of the left-middle field reveals decreased breath sounds. Examination of the right-lower field reveals decreased breath sounds.  Examination of the left-lower field reveals decreased breath sounds. Decreased breath sounds present.   Abdominal:      General: Bowel sounds are normal.      Palpations: Abdomen is soft.   Skin:     General: Skin is warm and dry.      Capillary Refill: Capillary refill takes less than 2 seconds.   Neurological:      General: No focal deficit present.      Mental Status: She is alert.   Psychiatric:         Mood and Affect: Mood normal.         Procedures          ED Course  ED Course as of Sep 12 1227   Sun Sep 12, 2021   1209 The patient was Covid positive.  Her PO2 was 58.  Initial O2 saturation was 85% on room air.  She is on 2 L of O2 via nasal cannula and satting 97% at this time.  I discussed the patient's case with the hospitalist who will admit the patient at this time.  Patient be admitted in stable condition.  Patient is aware of all findings and agreeable plan of care.    [LF]      ED Course User Index  [LF] Maritza Owen, APRN                                   CT Angiogram Chest   Final Result   1. There is no evidence of embolic disease.           2. Patchy nodular infiltrates are present. Most likely this is   pneumonia. Another differential consideration would be septic emboli..           This report was finalized on 09/12/2021 11:50 by Dr. Joselo Wright MD.      XR Chest AP   Final Result   1. No radiographic evidence of acute cardiopulmonary process.           This report was finalized on 09/12/2021 10:20 by Dr. Joselo Wright MD.        Labs Reviewed   COVID-19,[facility] IN-HOUSE,NASAL SWAB NO TRANSPORT MEDIA 2 HR TAT - Abnormal; Notable for the following components:       Result Value    COVID19 Detected (*)     All other components within normal limits    Narrative:     Fact sheet for providers: https://www.fda.gov/media/584982/download     Fact sheet for patients: https://www.fda.gov/media/738088/download    Test performed by PCR.    Consider negative results in combination with clinical  "observations, patient history, and epidemiological information.   COMPREHENSIVE METABOLIC PANEL - Abnormal; Notable for the following components:    Glucose 118 (*)     Potassium 3.1 (*)     CO2 20.0 (*)     All other components within normal limits    Narrative:     GFR Normal >60  Chronic Kidney Disease <60  Kidney Failure <15     CBC WITH AUTO DIFFERENTIAL - Abnormal; Notable for the following components:    Neutrophil % 82.6 (*)     Lymphocyte % 13.4 (*)     Monocyte % 3.2 (*)     Eosinophil % 0.1 (*)     Neutrophils, Absolute 8.60 (*)     All other components within normal limits   LACTATE DEHYDROGENASE - Abnormal; Notable for the following components:     (*)     All other components within normal limits   PROCALCITONIN - Abnormal; Notable for the following components:    Procalcitonin 0.27 (*)     All other components within normal limits    Narrative:     As a Marker for Sepsis (Non-Neonates):     1. <0.5 ng/mL represents a low risk of severe sepsis and/or septic shock.  2. >2 ng/mL represents a high risk of severe sepsis and/or septic shock.    As a Marker for Lower Respiratory Tract Infections that require antibiotic therapy:  PCT on Admission     Antibiotic Therapy             6-12 Hrs later  >0.5                          Strongly Recommended            >0.25 - <0.5             Recommended  0.1 - 0.25                  Discouraged                       Remeasure/reassess PCT  <0.1                         Strongly Discouraged         Remeasure/reassess PCT      As 28 day mortality risk marker: \"Change in Procalcitonin Result\" (>80% or <=80%) if Day 0 (or Day 1) and Day 4 values are available. Refer to http://www.[employer]-pct-calculator.com/    Change in PCT <=80 %   A decrease of PCT levels below or equal to 80% defines a positive change in PCT test result representing a higher risk for 28-day all-cause mortality of patients diagnosed with severe sepsis or septic shock.    Change in PCT >80 %   A " decrease of PCT levels of more than 80% defines a negative change in PCT result representing a lower risk for 28-day all-cause mortality of patients diagnosed with severe sepsis or septic shock.               D-DIMER, QUANTITATIVE - Abnormal; Notable for the following components:    D-Dimer, Quantitative 1.75 (*)     All other components within normal limits    Narrative:     Reference Range is 0-0.50 mg/L FEU. However, results <0.50 mg/L FEU tends to rule out DVT or PE. Results >0.50 mg/L FEU are not useful in predicting absence or presence of DVT or PE.     C-REACTIVE PROTEIN - Abnormal; Notable for the following components:    C-Reactive Protein 16.16 (*)     All other components within normal limits   BLOOD GAS, ARTERIAL - Abnormal; Notable for the following components:    pCO2, Arterial 29.8 (*)     pO2, Arterial 58.5 (*)     HCO3, Arterial 19.6 (*)     Base Excess, Arterial -3.6 (*)     O2 Saturation, Arterial 93.0 (*)     pCO2, Temperature Corrected 29.8 (*)     pO2, Temperature Corrected 58.5 (*)     All other components within normal limits   LACTIC ACID, PLASMA - Normal   FERRITIN - Normal    Narrative:     Results may be falsely decreased if patient taking Biotin.     LIPASE - Normal   TROPONIN (IN-HOUSE) - Normal    Narrative:     Troponin T Reference Range:  <= 0.03 ng/mL-   Negative for AMI  >0.03 ng/mL-     Abnormal for myocardial necrosis.  Clinicians would have to utilize clinical acumen, EKG, Troponin and serial changes to determine if it is an Acute Myocardial Infarction or myocardial injury due to an underlying chronic condition.       Results may be falsely decreased if patient taking Biotin.     BNP (IN-HOUSE) - Normal    Narrative:     Among patients with dyspnea, NT-proBNP is highly sensitive for the detection of acute congestive heart failure. In addition NT-proBNP of <300 pg/ml effectively rules out acute congestive heart failure with 99% negative predictive value.    Results may be falsely  decreased if patient taking Biotin.     BLOOD CULTURE   BLOOD CULTURE   STREP PNEUMO AG, URINE OR CSF   RAINBOW DRAW    Narrative:     The following orders were created for panel order [facility] Draw.  Procedure                               Abnormality         Status                     ---------                               -----------         ------                     Green Top (Gel)[[id number]]                                  Final result               Lavender Top[[id number]]                                     Final result               Red Top[[id number]]                                          Final result               Light Blue Top[[id number]]                                   Final result                 Please view results for these tests on the individual orders.   BLOOD GAS, ARTERIAL   POCT PEFORM URINE PREGNANCY   CBC AND DIFFERENTIAL    Narrative:     The following orders were created for panel order CBC & Differential.  Procedure                               Abnormality         Status                     ---------                               -----------         ------                     CBC Auto Differential[[id number]]        Abnormal            Final result                 Please view results for these tests on the individual orders.   GREEN TOP   LAVENDER TOP   RED TOP   LIGHT BLUE TOP             MDM  Number of Diagnoses or Management Options  Acute hypoxemic respiratory failure due to COVID-19 (CMS/HCC): new and requires workup     Amount and/or Complexity of Data Reviewed  Clinical lab tests: ordered and reviewed  Tests in the radiology section of CPT®: reviewed and ordered  Tests in the medicine section of CPT®: ordered and reviewed  Discuss the patient with other providers: yes    Patient Progress  Patient progress: stable      Final diagnoses:   Acute hypoxemic respiratory failure due to COVID-19 (CMS/HCC)       ED Disposition  ED Disposition     ED Disposition Condition Comment     Decision to Admit  Level of Care: Telemetry [5]   Diagnosis: Acute hypoxemic respiratory failure due to COVID-19 (CMS/[facility]) [[id number]]   Admitting Physician: BLANQUITA FERGUSON [[id number]]   Attending Physician: BLANQUITA FERGUSON [[id number]]   Isolate for COVID?: Yes [1]   Certification: I Certify That Inpatient Hospital Services Are Medically Necessary For Greater Than 2 Midnights            No follow-up provider specified.       Medication List      No changes were made to your prescriptions during this visit.          Maritza Owen, APRN  09/12/21 1227      Electronically signed by Dwight Mccarty Jr., MD at 09/12/21 1250          Physician Progress Notes (most recent note)      Nghia Flowers MD at 09/15/21 1345          1           [facility] Medicine Services  INPATIENT PROGRESS NOTE    Patient Name: Lamar Rodriguez  Date of Admission: 9/12/2021  Today's Date: 09/15/21  Length of Stay: 3  Primary Care Physician: Provider, No Known    Subjective   Chief Complaint: Follow-up  HPI   Patient was able to be titrated to 1.5 L nasal cannula but had some coughing spell which she was able to cough of phlegm.  She desaturated during the process but after dialing her up to 3.5 L her O2 saturation went up to the 90s.  CRP noted to be decreasing so is procalcitonin.    COVID LABS:  Results From Last 14 Days   Lab Units 09/15/21  0500 09/12/21  0936   PROBNP pg/mL  --  210.4   CRP mg/dL 9.36* 16.16*   D DIMER QUANT mg/L (FEU)  --  1.75*   FERRITIN ng/mL  --  48.44   LACTATE mmol/L  --  1.5   LDH U/L  --  243*   PROCALCITONIN ng/mL 0.15 0.27*   TROPONIN T ng/mL  --  <0.010     Still has fever    She states that her family/household has Covid.  She also states she is feeling better although has developed a pleuritic chest discomfort lateral to the right scapula.  It hurts also when coughing    Review of Systems     All pertinent negatives and positives are as above. All other  systems have been reviewed and are negative unless otherwise stated.     Objective    Temp:  [98.6 °F (37 °C)-101.5 °F (38.6 °C)] 98.6 °F (37 °C)  Heart Rate:  [] 101  Resp:  [16-18] 16  BP: ()/(60-70) 100/68  Physical Exam   She was working on incentive spirometry while I was there.  She is on her fifth attempt.  She reach 1000 funmilayo as I seen from afar  seated comfortably on recliner without any distress  On 2 L oxygen with O2 saturation in the low to mid 90s  She is 94% on 3LPM  Pleasant  No distress  Watching on her phone while laying comfortably on bed  GEN: Awake, alert, interactive,speaking in full sentenses  HEENT: EOMI, Anicteric, Trachea midline  Lungs: equal chest rise b/l, good respiratory effort, no accessory muscle use  No gross point tenderness on scapular area  Heart: Regular rate and rhythm  ABD: Nondistended, flat  Extremities:  no cyanosis, no edema  Skin: no visible rashes or petechiae  Neuro: AAOx3, no focal deficits  Psych: normal mood & affect         Results Review:  I have reviewed the labs, radiology results, and diagnostic studies.    Laboratory Data:   Results from last 7 days   Lab Units 09/13/21  0455 09/12/21  0936   WBC 10*3/mm3 7.05 10.41   HEMOGLOBIN g/dL 11.2* 13.4   HEMATOCRIT % 34.1 40.1   PLATELETS 10*3/mm3 160 157        Results from last 7 days   Lab Units 09/15/21  0500 09/14/21  0521 09/13/21  0455 09/12/21  0936 09/12/21  0936   SODIUM mmol/L  --   --  141  --  137   POTASSIUM mmol/L  --   --  3.6  --  3.1*   CHLORIDE mmol/L  --   --  111*  --  103   CO2 mmol/L  --   --  20.0*  --  20.0*   BUN mg/dL  --   --  13  --  12   CREATININE mg/dL 0.50* 0.40* 0.60   < > 0.73   CALCIUM mg/dL  --   --  8.4*  --  8.8   BILIRUBIN mg/dL 0.3 0.2 0.3   < > 0.4   ALK PHOS U/L 60 62 70   < > 85   ALT (SGPT) U/L 15 13 12   < > 18   AST (SGOT) U/L 29 21 22   < > 29   GLUCOSE mg/dL  --   --  107*  --  118*    < > = values in this interval not displayed.       Culture Data:   Blood  Culture   Date Value Ref Range Status   09/12/2021 No growth at 3 days  Preliminary   09/12/2021 No growth at 3 days  Preliminary       Radiology Data:   Imaging Results (Last 24 Hours)     ** No results found for the last 24 hours. **          I have reviewed the patient's current medications.     Assessment/Plan     Active Hospital Problems    Diagnosis    • **Acute hypoxemic respiratory failure due to COVID-19 (CMS/HCC)    • COVID-19 virus infection    • Hypokalemia    • Positive D dimer      Problem list    · COVID-19 with pneumonia  · Acute respiratory failure with hypoxia  · Hypokalemia - -resolved  · Elevated D-dimer likely secondary to COVID-19 without evidence of PE or DVT  · Fever secondary to above - prn antipyretic  · Pleurisy     Day 4 Remdesivir  Continue DVT prophylaxis  Encourage increase activities  O2 to maintain saturation between 90 to 94%  Encourage IS/flutter  Patient clinically doing better with exception that she still has fever needing antipyretic.  I reviewed cta of chest and cxr- Pneumonic process mostly on the right.  In fact her lung ct is mild compared to other COVID patients I have cared for.   Clinically she is doing better. Lab doing ok.  Fever still part and expected of viral infection. Trend is downward  procal is normal  She is on dvt prophylaxis  Will check another cxr.   Discussed plan of care        Discharge Planning:  tbd     Electronically signed by Nghia Flowers MD, 09/15/21, 13:45 CDT.                 Electronically signed by Nghia Flowers MD at 09/15/21 1412       Consult Notes (most recent note)    No notes of this type exist for this encounter.            Respiratory Therapy Notes (most recent note)      Vasyl Almeida, RRT, RPFT at 09/16/21 1417          Exercise Oximetry    Patient Name:Lamar Rodriguez   MRN: [id number]   Date: 09/16/21             ROOM AIR BASELINE   SpO2% 90   Heart Rate 86   Blood Pressure      EXERCISE ON ROOM AIR SpO2%  EXERCISE ON O2 @  LPM SpO2%   1 MINUTE 87 1 MINUTE    2 MINUTES  2 MINUTES    3 MINUTES  3 MINUTES    4 MINUTES  4 MINUTES    5 MINUTES  5 MINUTES    6 MINUTES  6 MINUTES               Distance Walked  50 ft Distance Walked   Dyspnea (Hugo Scale)  1 Dyspnea (Hugo Scale)   Fatigue (Hugo Scale)   Fatigue (Hugo Scale)   SpO2% Post Exercise  91 SpO2% Post Exercise   HR Post Exercise   HR Post Exercise   Time to Recovery  1 Time to Recovery     Comments: Patient became dizzy and had to stop after 1 minute.    Electronically signed by Vasyl Almeida, RRT, RPFT at 09/16/21 8404

## 2021-09-16 NOTE — PAYOR COMM NOTE
"9/16/21. [facility] [phone]    UPDATE CLINICAL. AND D/C SUMMARY    Lamar Montiel (21 y.o. Female)     Date of Birth Social Security Number Address Home Phone MRN    2000  [address] [place] [address] [phone] [id number]    Rastafarian Marital Status          Oriental orthodox Single       Admission Date Admission Type Admitting Provider Attending Provider Department, Room/Bed    9/12/21 Emergency Nghia Flowers MD [place], Glenn Riego, MD [address], 442/1    Discharge Date Discharge Disposition Discharge Destination         Home or Self Care              Attending Provider: Nghia Flowers MD    Allergies: No Known Allergies    Isolation: Enh Drop/Con   Infection: COVID (confirmed) (09/12/21)   Code Status: CPR    Ht: 177.8 cm (70\")   Wt: 68.2 kg (150 lb 4 oz)    Admission Cmt: None   Principal Problem: Acute hypoxemic respiratory failure due to COVID-19 (CMS/[facility]) [U07.1,J96.01]                 Active Insurance as of 9/12/2021     Primary Coverage     Payor Plan Insurance Group Employer/Plan Group    AETNA COMMERCIAL [facility] - ASA [id number]     Payor Plan Address Payor Plan Phone Number Payor Plan Fax Number Effective Dates    [address]   1/1/2016 - None Entered    [facility] [id number]       Subscriber Name Subscriber Birth Date Member ID       RENEE MONTIEL 4/18/1972 [id number]                 Emergency Contacts      (Rel.) Home Phone Work Phone Mobile Phone    Renee Montiel (Mother) [phone] -- --        [facility] Encounter Date/Time: 9/12/2021 0925   Hospital Account: [id number]    MRN: [id number]   Patient:  Lamar Montiel   Contact Serial #: [id number]   SSN:          ENCOUNTER             Patient Class: Inpatient   Unit: [address] Service: Medicine     Bed: 442/1   Admitting Provider: Nghia Flowers*   Referring Physician:     Attending Provider: Nghia Flowers*   Adm " Diagnosis: Acute hypoxemic respirat*               PATIENT          Name: Lamar Montiel : 2000 (21 yrs)   Address: [address] Sex: Female   City: [place] [address]   County: [facility]   Marital Status: SINGLE Ethnicity: NOT                                                                              Race: WHITE   Primary Care Provider: Provider, No Known Patients Phone: Home Phone: [phone]     Mobile Phone: [phone]   EMERGENCY CONTACT   Contact Name Legal Guardian? Relationship to Patient Home Phone Work Phone   1. Renee Montiel  2. *No Contact Specified* No    Mother    [phone]              GUARANTOR            Guarantor: Lamar Montiel     : 2000   Address: [address] Sex: [place]     [place] [address]     Relation to Patient: Self       Home Phone: [phone]   Guarantor ID: [id number]       Work Phone:     GUARANTOR EMPLOYER   Employer: [employer]         Status: FULL TIME   COVERAGE          PRIMARY INSURANCE   Payor: AETNA COMMERCIAL Plan: [facility] - ASA   Group Number: [id number] Insurance Type: INDEMNITY   Subscriber Name: RENEE MONTIEL Subscriber : 1974   Subscriber ID: [id number] [facility] [id number] [place] [address]   Pat. Rel. to Subscriber: Child Coverage Phone:     SECONDARY INSURANCE   Payor: N/A Plan: N/A   Group Number:   Insurance Type:     Subscriber Name:   Subscriber :     Subscriber ID:   Coverage Address:     Pat. Rel. to Subscriber:   Coverage Phone:        Contact Serial # ([id number])  `       2021    Chart ID ([id number])                   Discharge Summary      Nghia Flowers MD at 21 1340              [facility] Medicine Services  DISCHARGE SUMMARY       Date of Admission: 2021  Date of Discharge:  2021  Primary Care Physician: Provider, No Known    Discharge Diagnoses:  Active Hospital Problems    Diagnosis    • **Acute hypoxemic respiratory  failure due to COVID-19 (CMS/HCC)    • Cytokine release syndrome, grade 2    • COVID-19 virus infection    • Hypokalemia    • Positive D dimer          Presenting Problem/History of Present Illness:  Acute hypoxemic respiratory failure due to COVID-19 (CMS/HCC) [U07.1, J96.01]         Hospital Course  She is a 21-year-old woman with no significant past medical history presenting with shortness of breath.  Patient reported to have colds Thursday prior to her admission.  On the day of admission, she was reported to have more shortness of breath like having tightness in her chest relating this more of a problem getting deep breath.  Patient also had temperature of 101.6 the night prior to coming to the hospital.  Appetite has been poor.  When she arrived in the emergency room her O2 saturation was 85%.  She was tested positive for Covid.  She had an elevated D-dimer.  This was followed by a CT angiogram of the chest which showed no evidence of PE.  I reviewed the CT angiogram myself and was not quite impressed in terms of findings normally I have seen from more severe Covid.  Her chest x-ray reportedly clear.  Yesterday, she complained of pain medial to her right shoulder blade.  She denies any tenderness on palpation.  This is relieved by hot compress/aqua K pad.  She describes it as tightness.  She has no wheezing or crackles.  O2 saturation is in the low to mid 90s on 3 L which is similar to yesterday's setting.  Chest x-ray apparently showed increased bilateral groundglass infiltrate.  I reviewed the x-ray with her at bedside.  Noted that she also has decreased aeration bilateral compared to admit chest x-ray.  She had venous duplex ultrasound also in this hospitalization that showed no evidence of DVT in either lower extremities.  We explored differentials regarding pleurisy.  Patient has pneumonia and has coughing spells which definitely could cause some pain as stated.  Patient has been ruled out from PE and DVT  by imaging studies.  She has been on DVT prophylaxis since admission.  Her inflammatory marker is trending down.  She is clinically doing better but still requiring high oxygen.  She also has fever still which is readily addressed by acetaminophen.  I think this is something expected from her Covid infection.  Procalcitonin is normal at 0.15.  White count is normal.  Blood culture and strep pneumoniae antigen are normal.  At present time, I do not suspect that she has secondary bacterial infection  In reference to the hypoxia, we will do a pre and post exercise oxygen to determine her eligibility as well as how much she would need.  I noticed during our conversation with her mom over the phone in her room, she was not able to explain a whole lot to her mom as a verbal read back to our discussion while mom was listening.  I repeated my findings and discussed with mom while I was in her room.  Mom expressed adequate understanding.  She is agreeable for discharge.  Patient is felt medically stable and appropriate for discharge.  They were instructed to seek medical attention if any worsening condition.  If health department does not call her back (as she said she has not received a call from health department) I think she could be off isolation 14 days from same day (after September 26).  Otherwise, I will leave the quarantine duration recommendation per health department.      Procedures Performed:   None      Consults:   None      Pertinent Test Results:  Lab Results (all)     Procedure Component Value Units Date/Time    Blood Culture - Blood, Arm, Left [[id number]] Collected: 09/12/21 0937    Specimen: Blood from Arm, Left Updated: 09/16/21 1001     Blood Culture No growth at 4 days    Blood Culture - Blood, Arm, Left [[id number]] Collected: 09/12/21 0936    Specimen: Blood from Arm, Left Updated: 09/16/21 1001     Blood Culture No growth at 4 days    Hepatic Function Panel [[id number]]  (Abnormal) Collected: 09/16/21  "0707    Specimen: Blood Updated: 09/16/21 0751     Total Protein 6.9 g/dL      Albumin 3.40 g/dL      ALT (SGPT) 18 U/L      AST (SGOT) 28 U/L      Alkaline Phosphatase 61 U/L      Total Bilirubin 0.3 mg/dL      Bilirubin, Direct 0.2 mg/dL      Bilirubin, Indirect 0.1 mg/dL     Creatinine, Serum [[id number]]  (Abnormal) Collected: 09/16/21 0532    Specimen: Blood Updated: 09/16/21 0622     Creatinine 0.30 mg/dL      eGFR Non African Amer >150 mL/min/1.73     Narrative:      GFR Normal >60  Chronic Kidney Disease <60  Kidney Failure <15      Procalcitonin [[id number]]  (Normal) Collected: 09/15/21 0500    Specimen: Blood Updated: 09/15/21 0638     Procalcitonin 0.15 ng/mL     Narrative:      As a Marker for Sepsis (Non-Neonates):     1. <0.5 ng/mL represents a low risk of severe sepsis and/or septic shock.  2. >2 ng/mL represents a high risk of severe sepsis and/or septic shock.    As a Marker for Lower Respiratory Tract Infections that require antibiotic therapy:  PCT on Admission     Antibiotic Therapy             6-12 Hrs later  >0.5                          Strongly Recommended            >0.25 - <0.5             Recommended  0.1 - 0.25                  Discouraged                       Remeasure/reassess PCT  <0.1                         Strongly Discouraged         Remeasure/reassess PCT      As 28 day mortality risk marker: \"Change in Procalcitonin Result\" (>80% or <=80%) if Day 0 (or Day 1) and Day 4 values are available. Refer to http://www.[employer] rentalss-pct-calculator.com/    Change in PCT <=80 %   A decrease of PCT levels below or equal to 80% defines a positive change in PCT test result representing a higher risk for 28-day all-cause mortality of patients diagnosed with severe sepsis or septic shock.    Change in PCT >80 %   A decrease of PCT levels of more than 80% defines a negative change in PCT result representing a lower risk for 28-day all-cause mortality of patients diagnosed with severe sepsis or septic " shock.                C-reactive Protein [[id number]]  (Abnormal) Collected: 09/15/21 0500    Specimen: Blood Updated: 09/15/21 0634     C-Reactive Protein 9.36 mg/dL     Creatinine, Serum [[id number]]  (Abnormal) Collected: 09/15/21 0500    Specimen: Blood Updated: 09/15/21 0634     Creatinine 0.50 mg/dL      eGFR Non African Amer >150 mL/min/1.73     Narrative:      GFR Normal >60  Chronic Kidney Disease <60  Kidney Failure <15      Hepatic Function Panel [[id number]]  (Abnormal) Collected: 09/15/21 0500    Specimen: Blood Updated: 09/15/21 0634     Total Protein 6.5 g/dL      Albumin 3.40 g/dL      ALT (SGPT) 15 U/L      AST (SGOT) 29 U/L      Alkaline Phosphatase 60 U/L      Total Bilirubin 0.3 mg/dL      Bilirubin, Direct <0.2 mg/dL      Bilirubin, Indirect --     Comment: Unable to calculate       Hepatic Function Panel [[id number]]  (Abnormal) Collected: 09/14/21 0521    Specimen: Blood Updated: 09/14/21 0628     Total Protein 6.4 g/dL      Albumin 3.20 g/dL      ALT (SGPT) 13 U/L      AST (SGOT) 21 U/L      Alkaline Phosphatase 62 U/L      Total Bilirubin 0.2 mg/dL      Bilirubin, Direct <0.2 mg/dL      Bilirubin, Indirect --     Comment: Unable to calculate       Creatinine, Serum [[id number]]  (Abnormal) Collected: 09/14/21 0521    Specimen: Blood Updated: 09/14/21 0628     Creatinine 0.40 mg/dL      eGFR Non African Amer >150 mL/min/1.73     Narrative:      GFR Normal >60  Chronic Kidney Disease <60  Kidney Failure <15      Comprehensive Metabolic Panel [[id number]]  (Abnormal) Collected: 09/13/21 0455    Specimen: Blood Updated: 09/13/21 0614     Glucose 107 mg/dL      BUN 13 mg/dL      Creatinine 0.60 mg/dL      Sodium 141 mmol/L      Potassium 3.6 mmol/L      Chloride 111 mmol/L      CO2 20.0 mmol/L      Calcium 8.4 mg/dL      Total Protein 6.6 g/dL      Albumin 3.50 g/dL      ALT (SGPT) 12 U/L      AST (SGOT) 22 U/L      Alkaline Phosphatase 70 U/L      Total Bilirubin 0.3 mg/dL      eGFR Non  Amer  124 mL/min/1.73      Globulin 3.1 gm/dL      A/G Ratio 1.1 g/dL      BUN/Creatinine Ratio 21.7     Anion Gap 10.0 mmol/L     Narrative:      GFR Normal >60  Chronic Kidney Disease <60  Kidney Failure <15      Manual Differential [[id number]]  (Abnormal) Collected: 09/13/21 0455    Specimen: Blood Updated: 09/13/21 0559     Neutrophil % 90.0 %      Lymphocyte % 8.0 %      Monocyte % 1.0 %      Atypical Lymphocyte % 1.0 %      Neutrophils Absolute 6.35 10*3/mm3      Lymphocytes Absolute 0.63 10*3/mm3      Monocytes Absolute 0.07 10*3/mm3      Anisocytosis Slight/1+     Microcytes Slight/1+     WBC Morphology Normal     Platelet Morphology Normal    Bilirubin, Direct [[id number]]  (Normal) Collected: 09/13/21 0455    Specimen: Blood Updated: 09/13/21 0557     Bilirubin, Direct <0.2 mg/dL     Magnesium [[id number]]  (Normal) Collected: 09/13/21 0455    Specimen: Blood Updated: 09/13/21 0552     Magnesium 1.9 mg/dL     CBC & Differential [[id number]]  (Abnormal) Collected: 09/13/21 0455    Specimen: Blood Updated: 09/13/21 0537    Narrative:      The following orders were created for panel order CBC & Differential.  Procedure                               Abnormality         Status                     ---------                               -----------         ------                     CBC Auto Differential[[id number]]        Abnormal            Final result                 Please view results for these tests on the individual orders.    CBC Auto Differential [[id number]]  (Abnormal) Collected: 09/13/21 0455    Specimen: Blood Updated: 09/13/21 0537     WBC 7.05 10*3/mm3      RBC 4.16 10*6/mm3      Hemoglobin 11.2 g/dL      Hematocrit 34.1 %      MCV 82.0 fL      MCH 26.9 pg      MCHC 32.8 g/dL      RDW 14.6 %      RDW-SD 44.0 fl      MPV 11.9 fL      Platelets 160 10*3/mm3     S. Pneumo Ag Urine or CSF - Urine, Urine, Clean Catch [[id number]]  (Normal) Collected: 09/12/21 1628    Specimen: Urine, Clean Catch Updated: 09/12/21  1705     Strep Pneumo Ag Negative    Pregnancy, Urine - Urine, Clean Catch [[id number]]  (Normal) Collected: 09/12/21 1628    Specimen: Urine, Clean Catch Updated: 09/12/21 1639     HCG, Urine QL Negative    hCG, Serum, Qualitative [[id number]]  (Normal) Collected: 09/12/21 0936    Specimen: Blood Updated: 09/12/21 1324     HCG Qualitative Negative    [facility] Draw [[id number]] Collected: 09/12/21 0936    Specimen: Blood Updated: 09/12/21 1045    Narrative:      The following orders were created for panel order [facility] Draw.  Procedure                               Abnormality         Status                     ---------                               -----------         ------                     Green Top (Gel)[[id number]]                                  Final result               Lavender Top[[id number]]                                     Final result               Red Top[[id number]]                                          Final result               Light Blue Top[[id number]]                                   Final result                 Please view results for these tests on the individual orders.    Green Top (Gel) [[id number]] Collected: 09/12/21 0936    Specimen: Blood Updated: 09/12/21 1045     Extra Tube Hold for add-ons.     Comment: Auto resulted.       Red Top [[id number]] Collected: 09/12/21 0936    Specimen: Blood Updated: 09/12/21 1045     Extra Tube Hold for add-ons.     Comment: Auto resulted.       Lavender Top [[id number]] Collected: 09/12/21 0936    Specimen: Blood Updated: 09/12/21 1045     Extra Tube hold for add-on     Comment: Auto resulted       Light Blue Top [[id number]] Collected: 09/12/21 0936    Specimen: Blood Updated: 09/12/21 1045     Extra Tube hold for add-on     Comment: Auto resulted       Procalcitonin [[id number]]  (Abnormal) Collected: 09/12/21 0936    Specimen: Blood Updated: 09/12/21 1033     Procalcitonin 0.27 ng/mL     Narrative:      As a Marker for Sepsis (Non-Neonates):     1.  "<0.5 ng/mL represents a low risk of severe sepsis and/or septic shock.  2. >2 ng/mL represents a high risk of severe sepsis and/or septic shock.    As a Marker for Lower Respiratory Tract Infections that require antibiotic therapy:  PCT on Admission     Antibiotic Therapy             6-12 Hrs later  >0.5                          Strongly Recommended            >0.25 - <0.5             Recommended  0.1 - 0.25                  Discouraged                       Remeasure/reassess PCT  <0.1                         Strongly Discouraged         Remeasure/reassess PCT      As 28 day mortality risk marker: \"Change in Procalcitonin Result\" (>80% or <=80%) if Day 0 (or Day 1) and Day 4 values are available. Refer to http://www.[employer]pct-calculator.com/    Change in PCT <=80 %   A decrease of PCT levels below or equal to 80% defines a positive change in PCT test result representing a higher risk for 28-day all-cause mortality of patients diagnosed with severe sepsis or septic shock.    Change in PCT >80 %   A decrease of PCT levels of more than 80% defines a negative change in PCT result representing a lower risk for 28-day all-cause mortality of patients diagnosed with severe sepsis or septic shock.                Ferritin [[id number]]  (Normal) Collected: 09/12/21 0936    Specimen: Blood Updated: 09/12/21 1033     Ferritin 48.44 ng/mL     Narrative:      Results may be falsely decreased if patient taking Biotin.      C-reactive Protein [[id number]]  (Abnormal) Collected: 09/12/21 0936    Specimen: Blood Updated: 09/12/21 1029     C-Reactive Protein 16.16 mg/dL     Comprehensive Metabolic Panel [[id number]]  (Abnormal) Collected: 09/12/21 0936    Specimen: Blood Updated: 09/12/21 1029     Glucose 118 mg/dL      BUN 12 mg/dL      Creatinine 0.73 mg/dL      Sodium 137 mmol/L      Potassium 3.1 mmol/L      Chloride 103 mmol/L      CO2 20.0 mmol/L      Calcium 8.8 mg/dL      Total Protein 7.9 g/dL      Albumin 4.10 g/dL      ALT " (SGPT) 18 U/L      AST (SGOT) 29 U/L      Alkaline Phosphatase 85 U/L      Total Bilirubin 0.4 mg/dL      eGFR Non African Amer 99 mL/min/1.73      Globulin 3.8 gm/dL      A/G Ratio 1.1 g/dL      BUN/Creatinine Ratio 16.4     Anion Gap 14.0 mmol/L     Narrative:      GFR Normal >60  Chronic Kidney Disease <60  Kidney Failure <15      Lactate Dehydrogenase [[id number]]  (Abnormal) Collected: 09/12/21 0936    Specimen: Blood Updated: 09/12/21 1028      U/L     Troponin [[id number]]  (Normal) Collected: 09/12/21 0936    Specimen: Blood Updated: 09/12/21 1027     Troponin T <0.010 ng/mL     Narrative:      Troponin T Reference Range:  <= 0.03 ng/mL-   Negative for AMI  >0.03 ng/mL-     Abnormal for myocardial necrosis.  Clinicians would have to utilize clinical acumen, EKG, Troponin and serial changes to determine if it is an Acute Myocardial Infarction or myocardial injury due to an underlying chronic condition.       Results may be falsely decreased if patient taking Biotin.      BNP [[id number]]  (Normal) Collected: 09/12/21 0936    Specimen: Blood Updated: 09/12/21 1026     proBNP 210.4 pg/mL     Narrative:      Among patients with dyspnea, NT-proBNP is highly sensitive for the detection of acute congestive heart failure. In addition NT-proBNP of <300 pg/ml effectively rules out acute congestive heart failure with 99% negative predictive value.    Results may be falsely decreased if patient taking Biotin.      COVID-19,[facility] IN-HOUSE,Nasal Swab No Transport Media 3-4 HR TAT - Swab, Nasal Cavity [[id number]]  (Abnormal) Collected: 09/12/21 0939    Specimen: Swab from Nasal Cavity Updated: 09/12/21 1025     COVID19 Detected    Narrative:      Fact sheet for providers: https://www.fda.gov/media/345066/download     Fact sheet for patients: https://www.fda.gov/media/054487/download    Test performed by PCR.    Consider negative results in combination with clinical observations, patient history, and epidemiological  information.    Lipase [[id number]]  (Normal) Collected: 09/12/21 0936    Specimen: Blood Updated: 09/12/21 1024     Lipase 42 U/L     Lactic Acid, Plasma [[id number]]  (Normal) Collected: 09/12/21 0936    Specimen: Blood Updated: 09/12/21 1011     Lactate 1.5 mmol/L     D-dimer, Quantitative [[id number]]  (Abnormal) Collected: 09/12/21 0936    Specimen: Blood Updated: 09/12/21 1006     D-Dimer, Quantitative 1.75 mg/L (FEU)     Narrative:      Reference Range is 0-0.50 mg/L FEU. However, results <0.50 mg/L FEU tends to rule out DVT or PE. Results >0.50 mg/L FEU are not useful in predicting absence or presence of DVT or PE.      CBC & Differential [[id number]]  (Abnormal) Collected: 09/12/21 0936    Specimen: Blood Updated: 09/12/21 1003    Narrative:      The following orders were created for panel order CBC & Differential.  Procedure                               Abnormality         Status                     ---------                               -----------         ------                     CBC Auto Differential[[id number]]        Abnormal            Final result                 Please view results for these tests on the individual orders.    CBC Auto Differential [[id number]]  (Abnormal) Collected: 09/12/21 0936    Specimen: Blood Updated: 09/12/21 1003     WBC 10.41 10*3/mm3      RBC 4.88 10*6/mm3      Hemoglobin 13.4 g/dL      Hematocrit 40.1 %      MCV 82.2 fL      MCH 27.5 pg      MCHC 33.4 g/dL      RDW 14.5 %      RDW-SD 42.7 fl      MPV 11.7 fL      Platelets 157 10*3/mm3      Neutrophil % 82.6 %      Lymphocyte % 13.4 %      Monocyte % 3.2 %      Eosinophil % 0.1 %      Basophil % 0.2 %      Immature Grans % 0.5 %      Neutrophils, Absolute 8.60 10*3/mm3      Lymphocytes, Absolute 1.40 10*3/mm3      Monocytes, Absolute 0.33 10*3/mm3      Eosinophils, Absolute 0.01 10*3/mm3      Basophils, Absolute 0.02 10*3/mm3      Immature Grans, Absolute 0.05 10*3/mm3      nRBC 0.0 /100 WBC     Blood Gas, Arterial -  [[id number]]  (Abnormal) Collected: 09/12/21 0942    Specimen: Arterial Blood Updated: 09/12/21 0940     Site Right Brachial     Agusto's Test N/A     pH, Arterial 7.427 pH units      pCO2, Arterial 29.8 mm Hg      Comment: 84 Value below reference range        pO2, Arterial 58.5 mm Hg      Comment: 84 Value below reference range        HCO3, Arterial 19.6 mmol/L      Comment: 84 Value below reference range        Base Excess, Arterial -3.6 mmol/L      Comment: 84 Value below reference range        O2 Saturation, Arterial 93.0 %      Comment: 84 Value below reference range        Temperature 37.0 C      Barometric Pressure for Blood Gas 755 mmHg      Modality Room Air     Ventilator Mode NA     Collected by [id number]     Comment: Meter: [id number]     :  [id number]        pCO2, Temperature Corrected 29.8 mm Hg      pH, Temp Corrected 7.427 pH Units      pO2, Temperature Corrected 58.5 mm Hg         Imaging Results (All)     Procedure Component Value Units Date/Time    XR Chest 1 View [[id number]] Collected: 09/15/21 1442     Updated: 09/15/21 1446    Narrative:      EXAM: XR CHEST 1 VW- 9/15/2021 2:25 PM CDT     HISTORY: hypoxia, pleurisy; covid 19; U07.1-COVID-19; J96.01-Acute  respiratory failure with hypoxia       COMPARISON: September 12, 2021.     TECHNIQUE: Frontal radiograph of the chest     FINDINGS:   Increase in bilateral groundglass infiltrates compared to September 12, 2021.. Cardiac silhouettes normal. Cardiac monitoring leads are  present..      The osseous structures and surrounding soft tissues demonstrate no acute  abnormality.          Impression:      1. Increase in bilateral groundglass infiltrates consistent with  progression of pneumonia compared to September 12, 2021.        This report was finalized on 09/15/2021 14:43 by Dr. Joselo Wright MD.    US Venous Doppler Lower Extremity Bilateral (duplex) [[id number]] Collected: 09/13/21 1138     Updated: 09/13/21 1142    Narrative:       History: Swelling       Impression:      Impression: There is no evidence of deep venous thrombosis in either  lower extremity.     Comments: Bilateral lower extremity venous duplex exam was performed  using color Doppler flow, Doppler waveform analysis, and grayscale  imaging, with and without compression. There is no evidence of deep  venous thrombosis in the common femoral, popliteal, peroneal, and  posterior tibial veins bilaterally.         This report was finalized on 09/13/2021 11:39 by Dr. Michael Castrejon MD.    CT Angiogram Chest [[id number]] Collected: 09/12/21 1146     Updated: 09/12/21 1153    Narrative:      CT ANGIOGRAM CHEST- 9/12/2021 11:25 AM CDT      HISTORY: O2 sat 85%, recent pregnancy, evaluate for PE      COMPARISON: None.      DOSE LENGTH PRODUCT: 242 mGy cm. Automated exposure control was also  utilized to decrease patient radiation dose.     TECHNIQUE: Helical tomographic images of the chest were obtained after  the administration of intravenous contrast following angiogram protocol.  Additionally, 3D and multiplanar reformatted images were provided.        FINDINGS:    Pulmonary arteries: There is adequate enhancement of the pulmonary  arteries to evaluate for central and segmental pulmonary emboli. There  are no filling defects within the main, lobar, segmental or visualized  subsegmental pulmonary arteries. The pulmonary arteries are within  normal limits for size.      Aorta and great vessels: The aorta is well opacified and demonstrates no  dissection or aneurysm. The great vessels are normal in appearance.     Visualized neck base: The imaged portion of the base of the neck appears  unremarkable.      Lungs: Patchy nodular infiltrates are present in the right upper lobe  and right lower lobe. There is consolidation in the medial segment right  middle lobe. This may be pneumonia.. The trachea and bronchial tree are  patent.      Heart: The heart is normal in size. There is no  "pericardial effusion.      Mediastinum and lymph nodes: No enlarged mediastinal, hilar, or axillary  lymph nodes are present.      Skeletal and soft tissues: The osseous structures of the thorax and  surrounding soft tissues demonstrate no acute process.     Upper abdomen: The imaged portion of the upper abdomen demonstrates no  acute process.        Impression:      1. There is no evidence of embolic disease.        2. Patchy nodular infiltrates are present. Most likely this is  pneumonia. Another differential consideration would be septic emboli..        This report was finalized on 09/12/2021 11:50 by Dr. Joselo Wright MD.    XR Chest AP [[id number]] Collected: 09/12/21 1020     Updated: 09/12/21 1023    Narrative:      EXAM: XR CHEST AP- 9/12/2021 9:44 AM CDT     HISTORY: COVID Evaluation, Cough, Fever       COMPARISON: None.     TECHNIQUE: Frontal radiograph of the chest     FINDINGS:   The lungs are clear. The cardiomediastinal silhouette and pulmonary  vascularity are within normal limits.      The osseous structures and surrounding soft tissues demonstrate no acute  abnormality.          Impression:      1. No radiographic evidence of acute cardiopulmonary process.        This report was finalized on 09/12/2021 10:20 by Dr. Joselo Wright MD.          Condition on Discharge:    Stable  Physical Exam on Discharge:  BP 98/52 (BP Location: Left arm, Patient Position: Sitting)   Pulse 78   Temp 99.4 °F (37.4 °C) (Oral)   Resp 18   Ht 177.8 cm (70\")   Wt 68.2 kg (150 lb 4 oz)   LMP 08/22/2021 (Approximate)   SpO2 93%   BMI 21.56 kg/m²   Physical Exam   Seated comfortably on recliner  No distress speaks in full sentence and no acc muscle use  On 3LPM O2 sat at 92-94%  GEN: Awake, alert, interactive, in NAD  HEENT: Atraumatic, PERRLA, EOMI, Anicteric, Trachea midline  No point tenderness medial to right scapula  Lungs: CTAB, no wheezing/rales/rhonchi  Heart: RRR, +S1/s2, no rub  ABD: soft, nt/nd, +BS, no " guarding/rebound  Extremities: atraumatic, no cyanosis, no edema  Skin: no rashes or lesions  Neuro: AAOx3, no focal deficits  Psych: normal mood & affect      Discharge Disposition:  Home or Self Care    Discharge Medications:     Discharge Medications      New Medications      Instructions Start Date   acetaminophen 325 MG tablet  Commonly known as: TYLENOL   650 mg, Oral, Every 4 Hours PRN      albuterol sulfate  (90 Base) MCG/ACT inhaler  Commonly known as: PROVENTIL HFA;VENTOLIN HFA;PROAIR HFA   2 puffs, Inhalation, 4 Times Daily - RT      ascorbic acid 500 MG tablet  Commonly known as: VITAMIN C   500 mg, Oral, Daily   Start Date: September 17, 2021     dexamethasone 6 MG tablet  Commonly known as: DECADRON   6 mg, Oral, Daily With Breakfast   Start Date: September 17, 2021     famotidine 20 MG tablet  Commonly known as: PEPCID   20 mg, Oral, 2 Times Daily      guaiFENesin 600 MG 12 hr tablet  Commonly known as: MUCINEX   1,200 mg, Oral, Every 12 Hours Scheduled      melatonin 3 MG tablet   3 mg, Oral, Nightly      thiamine 100 MG tablet  tablet  Commonly known as: VITAMIN B-1   100 mg, Oral, Daily   Start Date: September 17, 2021     zinc sulfate 220 (50 Zn) MG capsule  Commonly known as: ZINCATE   220 mg, Oral, Daily   Start Date: September 17, 2021            Discharge Diet:   Diet Instructions     Diet: Regular      Discharge Diet: Regular          Discharge Care Plan / Instructions:   Continue on incentive spirometry/flutter valve  Seek medical attention if any worsening condition    Activity at Discharge:   Activity Instructions     Discharge Activity      Gradually resume activities    Gradually Increase Activity Until at Pre-Hospitalization Level            Follow-up Appointments:   Primary care provider within 1 week  Test Results Pending at Discharge:   Pending Labs     Order Current Status    Blood Culture - Blood, Arm, Left Preliminary result    Blood Culture - Blood, Arm, Left Preliminary  result           Electronically signed by Nghia Flowers MD, 9/16/2021, 13:56 CDT.    Time: > 30 mins        Part of this note may be an electronic transcription/translation of spoken language to printed text using the Dragon Dictation System.            Electronically signed by Nghia Flowers MD at 09/16/21 0769

## 2021-09-16 NOTE — PLAN OF CARE
Problem: Adult Inpatient Plan of Care  Goal: Plan of Care Review  Outcome: Ongoing, Progressing  Goal: Patient-Specific Goal (Individualized)  Outcome: Ongoing, Progressing  Goal: Absence of Hospital-Acquired Illness or Injury  Outcome: Ongoing, Progressing  Intervention: Identify and Manage Fall Risk  Recent Flowsheet Documentation  Taken 9/16/2021 0200 by García Cotton RN  Safety Promotion/Fall Prevention: safety round/check completed  Taken 9/16/2021 0000 by García Cotton RN  Safety Promotion/Fall Prevention: safety round/check completed  Taken 9/15/2021 2200 by García Cotton RN  Safety Promotion/Fall Prevention: safety round/check completed  Taken 9/15/2021 2100 by García Cotton RN  Safety Promotion/Fall Prevention: safety round/check completed  Taken 9/15/2021 2045 by García Cotton RN  Safety Promotion/Fall Prevention: safety round/check completed  Taken 9/15/2021 2000 by García Cotton RN  Safety Promotion/Fall Prevention: safety round/check completed  Intervention: Prevent Skin Injury  Recent Flowsheet Documentation  Taken 9/16/2021 0200 by García Cotton RN  Body Position:   side-lying, left   position changed independently  Taken 9/16/2021 0000 by García Cotton RN  Body Position:   position changed independently   side-lying, right  Taken 9/15/2021 2200 by García Cotton RN  Body Position: position changed independently  Taken 9/15/2021 2100 by García Cotton RN  Body Position: position changed independently  Taken 9/15/2021 2045 by García Cotton RN  Body Position: position changed independently  Taken 9/15/2021 2000 by García Cotton RN  Body Position: position changed independently  Goal: Optimal Comfort and Wellbeing  Outcome: Ongoing, Progressing  Goal: Readiness for Transition of Care  Outcome: Ongoing, Progressing     Problem: Gas Exchange Impaired  Goal: Optimal Gas Exchange  Outcome: Ongoing, Progressing   Goal Outcome Evaluation:

## 2021-09-17 ENCOUNTER — TRANSITIONAL CARE MANAGEMENT TELEPHONE ENCOUNTER (OUTPATIENT)
Dept: CALL CENTER | Facility: HOSPITAL | Age: 21
End: 2021-09-17

## 2021-09-17 LAB
BACTERIA SPEC AEROBE CULT: NORMAL
BACTERIA SPEC AEROBE CULT: NORMAL

## 2021-09-17 NOTE — OUTREACH NOTE
Call Center TCM Note      Responses   [facility] patient discharged from?  [place]   Does the patient have one of the following disease processes/diagnoses(primary or secondary)?  COVID-19   COVID-19 underlying condition?  None   TCM attempt successful?  Yes   Call start time  1149   Call end time  1151   Discharge diagnosis  COVID, resp failure   Meds reviewed with patient/caregiver?  Yes   Is the patient having any side effects they believe may be caused by any medication additions or changes?  No   Does the patient have all medications ordered at discharge?  Yes   Is the patient taking all medications as directed (includes completed medication regime)?  Yes   Does the patient have a primary care provider?   Yes   Comments regarding PCP  new patient appt lakshmi Womack on 9/27   Does the patient have an appointment with their PCP or specialist within 7 days of discharge?  Greater than 7 days   Nursing Interventions  Verified appointment date/time/provider   Has the patient kept scheduled appointments due by today?  N/A   Has home health visited the patient within 72 hours of discharge?  N/A   What DME was ordered?  [facility]   Has all DME been delivered?  Yes   DME comments  2L of O2 at all times    Psychosocial issues?  No   Did the patient receive a copy of their discharge instructions?  Yes   Did the patient receive a copy of COVID-19 specific instructions?  Yes   Nursing interventions  Reviewed instructions with patient   What is the patient's perception of their health status since discharge?  Improving   Does the patient have any of the following symptoms?  Shortness of breath   Nursing Interventions  Nurse provided patient education   Pulse Ox monitoring  Intermittent   Pulse Ox device source  Patient   O2 Sat comments  93%-95%   O2 Sat: education provided  Sat levels, Monitoring frequency, When to seek care   Is the patient/caregiver able to teach back steps to recovery at home?  Set small,  achievable goals for return to baseline health, Rest and rebuild strength, gradually increase activity   Is the patient/caregiver able to teach back the hierarchy of who to call/visit for symptoms/problems? PCP, Specialist, Home health nurse, Urgent Care, ED, 911  Yes   TCM call completed?  Yes   Wrap up additional comments  Says she is doing well, following her discharge instructions closely, confirmed f/u appt with new PCP for 9/27.          Palma Lewis RN    9/17/2021, 11:51 EDT

## 2021-09-17 NOTE — OUTREACH NOTE
Prep Survey      Responses   [facility] patient discharged from?  [place]   Is LACE score < 7 ?  No   Emergency Room discharge w/ pulse ox?  No   Eligibility  [facility]   Date of Admission  09/12/21   Date of Discharge  09/16/21   Discharge diagnosis  COVID, resp failure   Does the patient have one of the following disease processes/diagnoses(primary or secondary)?  COVID-19   Does the patient have Home health ordered?  No   Is there a DME ordered?  Yes   What DME was ordered?  [facility]   Prep survey completed?  Yes          Kaylyn Decker, RN

## 2021-09-17 NOTE — OUTREACH NOTE
Call Center TCM Note      Responses   [facility] patient discharged from?  [place]   Does the patient have one of the following disease processes/diagnoses(primary or secondary)?  COVID-19   COVID-19 underlying condition?  None   TCM attempt successful?  No   Unsuccessful attempts  Attempt 1 [reached mother but she states to call patient but no answer from patient.]   Discharge diagnosis  COVID, resp failure          Palma Lewis RN    9/17/2021, 09:50 EDT

## 2021-09-18 ENCOUNTER — READMISSION MANAGEMENT (OUTPATIENT)
Dept: CALL CENTER | Facility: HOSPITAL | Age: 21
End: 2021-09-18

## 2021-09-18 NOTE — OUTREACH NOTE
COVID-19 Week 1 Survey      Responses   [facility] patient discharged from?  [place]   Does the patient have one of the following disease processes/diagnoses(primary or secondary)?  COVID-19   COVID-19 underlying condition?  None   Call Number  Call 2   Week 1 Call successful?  No [Attempted both home and cell numbers-verbal release for patient only ]   Discharge diagnosis  COVID, resp failure          Mary Beth Casey RN

## 2021-09-19 ENCOUNTER — READMISSION MANAGEMENT (OUTPATIENT)
Dept: CALL CENTER | Facility: HOSPITAL | Age: 21
End: 2021-09-19

## 2021-09-19 NOTE — OUTREACH NOTE
COVID-19 Week 1 Survey      Responses   [facility] [place] patient discharged from?  [place]   Does the patient have one of the following disease processes/diagnoses(primary or secondary)?  COVID-19   COVID-19 underlying condition?  None   Call Number  Call 3   Week 1 Call successful?  No   Discharge diagnosis  COVID-19 virus infection/Cytokine release syndrome, grade 2          Sarah Quintanilla RN

## 2021-09-21 ENCOUNTER — READMISSION MANAGEMENT (OUTPATIENT)
Dept: CALL CENTER | Facility: HOSPITAL | Age: 21
End: 2021-09-21

## 2021-09-21 ENCOUNTER — APPOINTMENT (OUTPATIENT)
Dept: GENERAL RADIOLOGY | Facility: HOSPITAL | Age: 21
End: 2021-09-21

## 2021-09-21 ENCOUNTER — HOSPITAL ENCOUNTER (INPATIENT)
Facility: HOSPITAL | Age: 21
LOS: 15 days | Discharge: HOME | End: 2021-10-06
Attending: EMERGENCY MEDICINE | Admitting: FAMILY MEDICINE

## 2021-09-21 ENCOUNTER — APPOINTMENT (OUTPATIENT)
Dept: CT IMAGING | Facility: HOSPITAL | Age: 21
End: 2021-09-21

## 2021-09-21 DIAGNOSIS — J18.9 PNEUMONIA OF RIGHT LOWER LOBE DUE TO INFECTIOUS ORGANISM: ICD-10-CM

## 2021-09-21 DIAGNOSIS — J93.9 PNEUMOTHORAX ON RIGHT: ICD-10-CM

## 2021-09-21 DIAGNOSIS — J96.01 ACUTE RESPIRATORY FAILURE WITH HYPOXIA (HCC): Primary | ICD-10-CM

## 2021-09-21 DIAGNOSIS — J18.9 MULTIFOCAL PNEUMONIA: ICD-10-CM

## 2021-09-21 DIAGNOSIS — J93.9 PNEUMOTHORAX, UNSPECIFIED TYPE: ICD-10-CM

## 2021-09-21 DIAGNOSIS — U07.1 COVID-19: ICD-10-CM

## 2021-09-21 DIAGNOSIS — T17.500A MUCUS PLUGGING OF BRONCHI: ICD-10-CM

## 2021-09-21 LAB
ALBUMIN SERPL-MCNC: 3.2 G/DL (ref 3.5–5.2)
ALBUMIN/GLOB SERPL: 0.7 G/DL
ALP SERPL-CCNC: 121 U/L (ref 39–117)
ALT SERPL W P-5'-P-CCNC: 39 U/L (ref 1–33)
ANION GAP SERPL CALCULATED.3IONS-SCNC: 15 MMOL/L (ref 5–15)
APTT PPP: 23.6 SECONDS (ref 24.1–35)
ARTERIAL PATENCY WRIST A: ABNORMAL
AST SERPL-CCNC: 34 U/L (ref 1–32)
ATMOSPHERIC PRESS: 751 MMHG
BASE EXCESS BLDA CALC-SCNC: 1 MMOL/L (ref 0–2)
BDY SITE: ABNORMAL
BILIRUB SERPL-MCNC: 0.6 MG/DL (ref 0–1.2)
BODY TEMPERATURE: 37 C
BUN SERPL-MCNC: 22 MG/DL (ref 6–20)
BUN/CREAT SERPL: 52.4 (ref 7–25)
BURR CELLS BLD QL SMEAR: ABNORMAL
CALCIUM SPEC-SCNC: 8.6 MG/DL (ref 8.6–10.5)
CHLORIDE SERPL-SCNC: 101 MMOL/L (ref 98–107)
CLUMPED PLATELETS: PRESENT
CO2 SERPL-SCNC: 22 MMOL/L (ref 22–29)
CREAT SERPL-MCNC: 0.42 MG/DL (ref 0.57–1)
CRP SERPL-MCNC: 10.71 MG/DL (ref 0–0.5)
D DIMER PPP FEU-MCNC: 3.34 MG/L (FEU) (ref 0–0.5)
D-LACTATE SERPL-SCNC: 1.7 MMOL/L (ref 0.5–2)
D-LACTATE SERPL-SCNC: 2.1 MMOL/L (ref 0.5–2)
DEPRECATED RDW RBC AUTO: 46.3 FL (ref 37–54)
EPAP: 8
ERYTHROCYTE [DISTWIDTH] IN BLOOD BY AUTOMATED COUNT: 17.3 % (ref 12.3–15.4)
GFR SERPL CREATININE-BSD FRML MDRD: >150 ML/MIN/1.73
GLOBULIN UR ELPH-MCNC: 4.5 GM/DL
GLUCOSE SERPL-MCNC: 112 MG/DL (ref 65–99)
HCG SERPL QL: NEGATIVE
HCO3 BLDA-SCNC: 22.9 MMOL/L (ref 20–26)
HCT VFR BLD AUTO: 35.9 % (ref 34–46.6)
HGB BLD-MCNC: 12.8 G/DL (ref 12–15.9)
HOLD SPECIMEN: NORMAL
HOLD SPECIMEN: NORMAL
INHALED O2 CONCENTRATION: 100 %
INR PPP: 1.17 (ref 0.91–1.09)
IPAP: 16
LYMPHOCYTES # BLD MANUAL: 1.66 10*3/MM3 (ref 0.7–3.1)
LYMPHOCYTES NFR BLD MANUAL: 14.1 % (ref 19.6–45.3)
LYMPHOCYTES NFR BLD MANUAL: 7.1 % (ref 5–12)
Lab: ABNORMAL
MAGNESIUM SERPL-MCNC: 2.2 MG/DL (ref 1.6–2.6)
MCH RBC QN AUTO: 31.4 PG (ref 26.6–33)
MCHC RBC AUTO-ENTMCNC: 35.7 G/DL (ref 31.5–35.7)
MCV RBC AUTO: 88 FL (ref 79–97)
MODALITY: ABNORMAL
MONOCYTES # BLD AUTO: 0.78 10*3/MM3 (ref 0.1–0.9)
MRSA DNA SPEC QL NAA+PROBE: ABNORMAL
NEUTROPHILS # BLD AUTO: 8.55 10*3/MM3 (ref 1.7–7)
NEUTROPHILS NFR BLD MANUAL: 65.7 % (ref 42.7–76)
NEUTS BAND NFR BLD MANUAL: 12.1 % (ref 0–5)
NT-PROBNP SERPL-MCNC: 88.8 PG/ML (ref 0–450)
PCO2 BLDA: 27.8 MM HG (ref 35–45)
PCO2 TEMP ADJ BLD: 27.8 MM HG (ref 35–45)
PH BLDA: 7.52 PH UNITS (ref 7.35–7.45)
PH, TEMP CORRECTED: 7.52 PH UNITS (ref 7.35–7.45)
PLATELET # BLD AUTO: 488 10*3/MM3 (ref 140–450)
PMV BLD AUTO: 10.7 FL (ref 6–12)
PO2 BLDA: 57 MM HG (ref 83–108)
PO2 TEMP ADJ BLD: 57 MM HG (ref 83–108)
POIKILOCYTOSIS BLD QL SMEAR: ABNORMAL
POLYCHROMASIA BLD QL SMEAR: ABNORMAL
POTASSIUM SERPL-SCNC: 4.3 MMOL/L (ref 3.5–5.2)
PROCALCITONIN SERPL-MCNC: 0.07 NG/ML (ref 0–0.25)
PROT SERPL-MCNC: 7.7 G/DL (ref 6–8.5)
PROTHROMBIN TIME: 14.5 SECONDS (ref 11.9–14.6)
RBC # BLD AUTO: 4.08 10*6/MM3 (ref 3.77–5.28)
SAO2 % BLDCOA: 91.7 % (ref 94–99)
SARS-COV-2 RNA PNL SPEC NAA+PROBE: DETECTED
SET MECH RESP RATE: 20
SODIUM SERPL-SCNC: 138 MMOL/L (ref 136–145)
TROPONIN T SERPL-MCNC: <0.01 NG/ML (ref 0–0.03)
VARIANT LYMPHS NFR BLD MANUAL: 1 % (ref 0–5)
VENTILATOR MODE: ABNORMAL
WBC # BLD AUTO: 10.99 10*3/MM3 (ref 3.4–10.8)
WBC MORPH BLD: NORMAL
WHOLE BLOOD HOLD SPECIMEN: NORMAL
WHOLE BLOOD HOLD SPECIMEN: NORMAL

## 2021-09-21 PROCEDURE — 85730 THROMBOPLASTIN TIME PARTIAL: CPT | Performed by: EMERGENCY MEDICINE

## 2021-09-21 PROCEDURE — 94799 UNLISTED PULMONARY SVC/PX: CPT

## 2021-09-21 PROCEDURE — 25010000002 ENOXAPARIN PER 10 MG: Performed by: EMERGENCY MEDICINE

## 2021-09-21 PROCEDURE — 25010000002 PIPERACILLIN SOD-TAZOBACTAM PER 1 G: Performed by: EMERGENCY MEDICINE

## 2021-09-21 PROCEDURE — 85610 PROTHROMBIN TIME: CPT | Performed by: EMERGENCY MEDICINE

## 2021-09-21 PROCEDURE — 71275 CT ANGIOGRAPHY CHEST: CPT

## 2021-09-21 PROCEDURE — 71045 X-RAY EXAM CHEST 1 VIEW: CPT

## 2021-09-21 PROCEDURE — 87635 SARS-COV-2 COVID-19 AMP PRB: CPT | Performed by: EMERGENCY MEDICINE

## 2021-09-21 PROCEDURE — 86140 C-REACTIVE PROTEIN: CPT | Performed by: EMERGENCY MEDICINE

## 2021-09-21 PROCEDURE — 83735 ASSAY OF MAGNESIUM: CPT | Performed by: EMERGENCY MEDICINE

## 2021-09-21 PROCEDURE — 25010000002 LORAZEPAM PER 2 MG: Performed by: EMERGENCY MEDICINE

## 2021-09-21 PROCEDURE — 99232 SBSQ HOSP IP/OBS MODERATE 35: CPT | Performed by: NURSE PRACTITIONER

## 2021-09-21 PROCEDURE — 87040 BLOOD CULTURE FOR BACTERIA: CPT | Performed by: EMERGENCY MEDICINE

## 2021-09-21 PROCEDURE — 87150 DNA/RNA AMPLIFIED PROBE: CPT | Performed by: EMERGENCY MEDICINE

## 2021-09-21 PROCEDURE — 80053 COMPREHEN METABOLIC PANEL: CPT

## 2021-09-21 PROCEDURE — 87147 CULTURE TYPE IMMUNOLOGIC: CPT | Performed by: EMERGENCY MEDICINE

## 2021-09-21 PROCEDURE — 82803 BLOOD GASES ANY COMBINATION: CPT

## 2021-09-21 PROCEDURE — 94660 CPAP INITIATION&MGMT: CPT

## 2021-09-21 PROCEDURE — 99291 CRITICAL CARE FIRST HOUR: CPT

## 2021-09-21 PROCEDURE — 25010000002 FENTANYL CITRATE (PF) 50 MCG/ML SOLUTION: Performed by: EMERGENCY MEDICINE

## 2021-09-21 PROCEDURE — 0W9930Z DRAINAGE OF RIGHT PLEURAL CAVITY WITH DRAINAGE DEVICE, PERCUTANEOUS APPROACH: ICD-10-PCS | Performed by: EMERGENCY MEDICINE

## 2021-09-21 PROCEDURE — 83605 ASSAY OF LACTIC ACID: CPT

## 2021-09-21 PROCEDURE — 99254 IP/OBS CNSLTJ NEW/EST MOD 60: CPT | Performed by: INTERNAL MEDICINE

## 2021-09-21 PROCEDURE — 25010000002 ENOXAPARIN PER 10 MG: Performed by: FAMILY MEDICINE

## 2021-09-21 PROCEDURE — 25010000002 PROPOFOL 10 MG/ML EMULSION: Performed by: EMERGENCY MEDICINE

## 2021-09-21 PROCEDURE — 87899 AGENT NOS ASSAY W/OPTIC: CPT | Performed by: FAMILY MEDICINE

## 2021-09-21 PROCEDURE — 85007 BL SMEAR W/DIFF WBC COUNT: CPT

## 2021-09-21 PROCEDURE — 85025 COMPLETE CBC W/AUTO DIFF WBC: CPT

## 2021-09-21 PROCEDURE — 93010 ELECTROCARDIOGRAM REPORT: CPT | Performed by: INTERNAL MEDICINE

## 2021-09-21 PROCEDURE — 93005 ELECTROCARDIOGRAM TRACING: CPT | Performed by: EMERGENCY MEDICINE

## 2021-09-21 PROCEDURE — 99152 MOD SED SAME PHYS/QHP 5/>YRS: CPT

## 2021-09-21 PROCEDURE — 25010000002 AZITHROMYCIN PER 500 MG: Performed by: INTERNAL MEDICINE

## 2021-09-21 PROCEDURE — 87899 AGENT NOS ASSAY W/OPTIC: CPT | Performed by: INTERNAL MEDICINE

## 2021-09-21 PROCEDURE — 0 IOPAMIDOL PER 1 ML: Performed by: EMERGENCY MEDICINE

## 2021-09-21 PROCEDURE — 84703 CHORIONIC GONADOTROPIN ASSAY: CPT | Performed by: EMERGENCY MEDICINE

## 2021-09-21 PROCEDURE — 87186 SC STD MICRODIL/AGAR DIL: CPT | Performed by: EMERGENCY MEDICINE

## 2021-09-21 PROCEDURE — 36600 WITHDRAWAL OF ARTERIAL BLOOD: CPT

## 2021-09-21 PROCEDURE — 87641 MR-STAPH DNA AMP PROBE: CPT | Performed by: FAMILY MEDICINE

## 2021-09-21 PROCEDURE — 25010000002 VANCOMYCIN 10 G RECONSTITUTED SOLUTION: Performed by: FAMILY MEDICINE

## 2021-09-21 PROCEDURE — 84484 ASSAY OF TROPONIN QUANT: CPT | Performed by: EMERGENCY MEDICINE

## 2021-09-21 PROCEDURE — 25010000002 ONDANSETRON PER 1 MG

## 2021-09-21 PROCEDURE — 25010000002 DEXAMETHASONE PER 1 MG: Performed by: EMERGENCY MEDICINE

## 2021-09-21 PROCEDURE — 25010000002 MEROPENEM PER 100 MG: Performed by: FAMILY MEDICINE

## 2021-09-21 PROCEDURE — 84145 PROCALCITONIN (PCT): CPT | Performed by: EMERGENCY MEDICINE

## 2021-09-21 PROCEDURE — 85379 FIBRIN DEGRADATION QUANT: CPT | Performed by: EMERGENCY MEDICINE

## 2021-09-21 PROCEDURE — 83880 ASSAY OF NATRIURETIC PEPTIDE: CPT | Performed by: EMERGENCY MEDICINE

## 2021-09-21 RX ORDER — LORAZEPAM 2 MG/ML
0.25 INJECTION INTRAMUSCULAR ONCE
Status: COMPLETED | OUTPATIENT
Start: 2021-09-21 | End: 2021-09-21

## 2021-09-21 RX ORDER — DEXAMETHASONE 4 MG/1
6 TABLET ORAL
Status: DISCONTINUED | OUTPATIENT
Start: 2021-09-22 | End: 2021-09-21 | Stop reason: SDUPTHER

## 2021-09-21 RX ORDER — ACETAMINOPHEN 325 MG/1
650 TABLET ORAL EVERY 6 HOURS PRN
Status: DISCONTINUED | OUTPATIENT
Start: 2021-09-21 | End: 2021-10-06 | Stop reason: HOSPADM

## 2021-09-21 RX ORDER — KETAMINE HCL IN NACL, ISO-OSM 100MG/10ML
100 SYRINGE (ML) INJECTION ONCE
Status: COMPLETED | OUTPATIENT
Start: 2021-09-21 | End: 2021-09-21

## 2021-09-21 RX ORDER — PROPOFOL 10 MG/ML
100 VIAL (ML) INTRAVENOUS ONCE
Status: COMPLETED | OUTPATIENT
Start: 2021-09-21 | End: 2021-09-21

## 2021-09-21 RX ORDER — FENTANYL CITRATE 50 UG/ML
50 INJECTION, SOLUTION INTRAMUSCULAR; INTRAVENOUS ONCE
Status: COMPLETED | OUTPATIENT
Start: 2021-09-21 | End: 2021-09-21

## 2021-09-21 RX ORDER — HYDROCODONE BITARTRATE AND ACETAMINOPHEN 7.5; 325 MG/1; MG/1
1 TABLET ORAL EVERY 8 HOURS PRN
Status: DISPENSED | OUTPATIENT
Start: 2021-09-21 | End: 2021-09-28

## 2021-09-21 RX ORDER — ONDANSETRON 2 MG/ML
INJECTION INTRAMUSCULAR; INTRAVENOUS
Status: COMPLETED
Start: 2021-09-21 | End: 2021-09-21

## 2021-09-21 RX ORDER — DEXAMETHASONE SODIUM PHOSPHATE 10 MG/ML
6 INJECTION INTRAMUSCULAR; INTRAVENOUS DAILY
Status: DISCONTINUED | OUTPATIENT
Start: 2021-09-22 | End: 2021-09-22

## 2021-09-21 RX ORDER — DEXAMETHASONE SODIUM PHOSPHATE 10 MG/ML
6 INJECTION INTRAMUSCULAR; INTRAVENOUS EVERY 6 HOURS
Status: DISCONTINUED | OUTPATIENT
Start: 2021-09-21 | End: 2021-09-21

## 2021-09-21 RX ORDER — ONDANSETRON 2 MG/ML
4 INJECTION INTRAMUSCULAR; INTRAVENOUS EVERY 6 HOURS PRN
Status: DISCONTINUED | OUTPATIENT
Start: 2021-09-21 | End: 2021-10-06 | Stop reason: HOSPADM

## 2021-09-21 RX ORDER — LIDOCAINE HYDROCHLORIDE 20 MG/ML
10 INJECTION, SOLUTION INFILTRATION; PERINEURAL ONCE
Status: COMPLETED | OUTPATIENT
Start: 2021-09-21 | End: 2021-09-21

## 2021-09-21 RX ORDER — SODIUM CHLORIDE 0.9 % (FLUSH) 0.9 %
10 SYRINGE (ML) INJECTION AS NEEDED
Status: DISCONTINUED | OUTPATIENT
Start: 2021-09-21 | End: 2021-10-06 | Stop reason: HOSPADM

## 2021-09-21 RX ORDER — ALBUTEROL SULFATE 90 UG/1
2 AEROSOL, METERED RESPIRATORY (INHALATION)
Status: DISCONTINUED | OUTPATIENT
Start: 2021-09-21 | End: 2021-09-28

## 2021-09-21 RX ADMIN — MEROPENEM 1 G: 1 INJECTION, POWDER, FOR SOLUTION INTRAVENOUS at 19:11

## 2021-09-21 RX ADMIN — MEROPENEM 1 G: 1 INJECTION, POWDER, FOR SOLUTION INTRAVENOUS at 22:57

## 2021-09-21 RX ADMIN — LORAZEPAM 0.25 MG: 2 INJECTION INTRAMUSCULAR; INTRAVENOUS at 10:58

## 2021-09-21 RX ADMIN — ENOXAPARIN SODIUM 40 MG: 40 INJECTION SUBCUTANEOUS at 21:23

## 2021-09-21 RX ADMIN — ENOXAPARIN SODIUM 70 MG: 80 INJECTION, SOLUTION INTRAVENOUS; SUBCUTANEOUS at 08:10

## 2021-09-21 RX ADMIN — SODIUM CHLORIDE, PRESERVATIVE FREE 10 ML: 5 INJECTION INTRAVENOUS at 21:25

## 2021-09-21 RX ADMIN — FENTANYL CITRATE 50 MCG: 50 INJECTION, SOLUTION INTRAMUSCULAR; INTRAVENOUS at 11:00

## 2021-09-21 RX ADMIN — AZITHROMYCIN DIHYDRATE 500 MG: 500 INJECTION, POWDER, LYOPHILIZED, FOR SOLUTION INTRAVENOUS at 20:22

## 2021-09-21 RX ADMIN — Medication 25 MG: at 09:25

## 2021-09-21 RX ADMIN — LIDOCAINE HYDROCHLORIDE 10 ML: 20 INJECTION, SOLUTION INFILTRATION; PERINEURAL at 09:28

## 2021-09-21 RX ADMIN — SODIUM CHLORIDE, POTASSIUM CHLORIDE, SODIUM LACTATE AND CALCIUM CHLORIDE 500 ML: 600; 310; 30; 20 INJECTION, SOLUTION INTRAVENOUS at 07:44

## 2021-09-21 RX ADMIN — PROPOFOL 30 MG: 10 INJECTION, EMULSION INTRAVENOUS at 09:25

## 2021-09-21 RX ADMIN — VANCOMYCIN HYDROCHLORIDE 1250 MG: 10 INJECTION, POWDER, LYOPHILIZED, FOR SOLUTION INTRAVENOUS at 18:00

## 2021-09-21 RX ADMIN — IOPAMIDOL 100 ML: 755 INJECTION, SOLUTION INTRAVENOUS at 11:53

## 2021-09-21 RX ADMIN — ONDANSETRON 4 MG: 2 INJECTION INTRAMUSCULAR; INTRAVENOUS at 21:45

## 2021-09-21 RX ADMIN — HYDROCODONE BITARTRATE AND ACETAMINOPHEN 1 TABLET: 7.5; 325 TABLET ORAL at 21:20

## 2021-09-21 RX ADMIN — DEXAMETHASONE SODIUM PHOSPHATE 6 MG: 10 INJECTION INTRAMUSCULAR; INTRAVENOUS at 07:36

## 2021-09-21 RX ADMIN — VANCOMYCIN HYDROCHLORIDE 1250 MG: 10 INJECTION, POWDER, LYOPHILIZED, FOR SOLUTION INTRAVENOUS at 22:54

## 2021-09-21 RX ADMIN — PIPERACILLIN AND TAZOBACTAM 4.5 G: 4; .5 INJECTION, POWDER, LYOPHILIZED, FOR SOLUTION INTRAVENOUS; PARENTERAL at 11:02

## 2021-09-22 ENCOUNTER — APPOINTMENT (OUTPATIENT)
Dept: CARDIOLOGY | Facility: HOSPITAL | Age: 21
End: 2021-09-22

## 2021-09-22 ENCOUNTER — APPOINTMENT (OUTPATIENT)
Dept: GENERAL RADIOLOGY | Facility: HOSPITAL | Age: 21
End: 2021-09-22

## 2021-09-22 LAB
ALBUMIN SERPL-MCNC: 2.6 G/DL (ref 3.5–5.2)
ALBUMIN/GLOB SERPL: 0.7 G/DL
ALP SERPL-CCNC: 137 U/L (ref 39–117)
ALT SERPL W P-5'-P-CCNC: 23 U/L (ref 1–33)
ANION GAP SERPL CALCULATED.3IONS-SCNC: 7 MMOL/L (ref 5–15)
ARTERIAL PATENCY WRIST A: POSITIVE
AST SERPL-CCNC: 14 U/L (ref 1–32)
ATMOSPHERIC PRESS: 751 MMHG
BACTERIA BLD CULT: ABNORMAL
BASE EXCESS BLDA CALC-SCNC: 4.5 MMOL/L (ref 0–2)
BDY SITE: ABNORMAL
BH CV ECHO MEAS - AO MAX PG (FULL): 1.4 MMHG
BH CV ECHO MEAS - AO MAX PG: 4.8 MMHG
BH CV ECHO MEAS - AO MEAN PG (FULL): 1 MMHG
BH CV ECHO MEAS - AO MEAN PG: 3 MMHG
BH CV ECHO MEAS - AO ROOT AREA (BSA CORRECTED): 1.8
BH CV ECHO MEAS - AO ROOT AREA: 8 CM^2
BH CV ECHO MEAS - AO ROOT DIAM: 3.2 CM
BH CV ECHO MEAS - AO V2 MAX: 110 CM/SEC
BH CV ECHO MEAS - AO V2 MEAN: 86.5 CM/SEC
BH CV ECHO MEAS - AO V2 VTI: 25.5 CM
BH CV ECHO MEAS - AVA(I,A): 2.4 CM^2
BH CV ECHO MEAS - AVA(I,D): 2.4 CM^2
BH CV ECHO MEAS - AVA(V,A): 2.7 CM^2
BH CV ECHO MEAS - AVA(V,D): 2.7 CM^2
BH CV ECHO MEAS - BSA(HAYCOCK): 1.8 M^2
BH CV ECHO MEAS - BSA: 1.8 M^2
BH CV ECHO MEAS - BZI_BMI: 19.9 KILOGRAMS/M^2
BH CV ECHO MEAS - BZI_METRIC_HEIGHT: 177.8 CM
BH CV ECHO MEAS - BZI_METRIC_WEIGHT: 63.1 KG
BH CV ECHO MEAS - EDV(CUBED): 54 ML
BH CV ECHO MEAS - EDV(MOD-SP4): 65.2 ML
BH CV ECHO MEAS - EDV(TEICH): 61.2 ML
BH CV ECHO MEAS - EF(CUBED): 62.7 %
BH CV ECHO MEAS - EF(MOD-SP4): 55.8 %
BH CV ECHO MEAS - EF(TEICH): 55 %
BH CV ECHO MEAS - ESV(CUBED): 20.1 ML
BH CV ECHO MEAS - ESV(MOD-SP4): 28.8 ML
BH CV ECHO MEAS - ESV(TEICH): 27.5 ML
BH CV ECHO MEAS - FS: 28 %
BH CV ECHO MEAS - IVS/LVPW: 0.92
BH CV ECHO MEAS - IVSD: 1.1 CM
BH CV ECHO MEAS - LA DIMENSION: 3.1 CM
BH CV ECHO MEAS - LA/AO: 0.97
BH CV ECHO MEAS - LAT PEAK E' VEL: 8.7 CM/SEC
BH CV ECHO MEAS - LV DIASTOLIC VOL/BSA (35-75): 36.5 ML/M^2
BH CV ECHO MEAS - LV MASS(C)D: 138.5 GRAMS
BH CV ECHO MEAS - LV MASS(C)DI: 77.5 GRAMS/M^2
BH CV ECHO MEAS - LV MAX PG: 3.5 MMHG
BH CV ECHO MEAS - LV MEAN PG: 2 MMHG
BH CV ECHO MEAS - LV SYSTOLIC VOL/BSA (12-30): 16.1 ML/M^2
BH CV ECHO MEAS - LV V1 MAX: 93.1 CM/SEC
BH CV ECHO MEAS - LV V1 MEAN: 72.3 CM/SEC
BH CV ECHO MEAS - LV V1 VTI: 19.8 CM
BH CV ECHO MEAS - LVIDD: 3.8 CM
BH CV ECHO MEAS - LVIDS: 2.7 CM
BH CV ECHO MEAS - LVLD AP4: 6.9 CM
BH CV ECHO MEAS - LVLS AP4: 5.7 CM
BH CV ECHO MEAS - LVOT AREA (M): 3.1 CM^2
BH CV ECHO MEAS - LVOT AREA: 3.1 CM^2
BH CV ECHO MEAS - LVOT DIAM: 2 CM
BH CV ECHO MEAS - LVPWD: 1.2 CM
BH CV ECHO MEAS - MED PEAK E' VEL: 10.2 CM/SEC
BH CV ECHO MEAS - MV A MAX VEL: 74.2 CM/SEC
BH CV ECHO MEAS - MV DEC SLOPE: 441 CM/SEC^2
BH CV ECHO MEAS - MV DEC TIME: 0.19 SEC
BH CV ECHO MEAS - MV E MAX VEL: 85.4 CM/SEC
BH CV ECHO MEAS - MV E/A: 1.2
BH CV ECHO MEAS - SI(AO): 114.7 ML/M^2
BH CV ECHO MEAS - SI(CUBED): 18.9 ML/M^2
BH CV ECHO MEAS - SI(LVOT): 34.8 ML/M^2
BH CV ECHO MEAS - SI(MOD-SP4): 20.4 ML/M^2
BH CV ECHO MEAS - SI(TEICH): 18.8 ML/M^2
BH CV ECHO MEAS - SV(AO): 205.1 ML
BH CV ECHO MEAS - SV(CUBED): 33.9 ML
BH CV ECHO MEAS - SV(LVOT): 62.2 ML
BH CV ECHO MEAS - SV(MOD-SP4): 36.4 ML
BH CV ECHO MEAS - SV(TEICH): 33.7 ML
BH CV ECHO MEAS - TR MAX VEL: 191 CM/SEC
BH CV ECHO MEASUREMENTS AVERAGE E/E' RATIO: 9.04
BILIRUB SERPL-MCNC: 0.8 MG/DL (ref 0–1.2)
BODY TEMPERATURE: 37 C
BOTTLE TYPE: ABNORMAL
BUN SERPL-MCNC: 20 MG/DL (ref 6–20)
BUN/CREAT SERPL: 51.3 (ref 7–25)
CALCIUM SPEC-SCNC: 8.3 MG/DL (ref 8.6–10.5)
CHLORIDE SERPL-SCNC: 101 MMOL/L (ref 98–107)
CLUMPED PLATELETS: PRESENT
CO2 SERPL-SCNC: 25 MMOL/L (ref 22–29)
CREAT SERPL-MCNC: 0.39 MG/DL (ref 0.57–1)
DEPRECATED RDW RBC AUTO: 46.4 FL (ref 37–54)
EPAP: 8
ERYTHROCYTE [DISTWIDTH] IN BLOOD BY AUTOMATED COUNT: 15.2 % (ref 12.3–15.4)
GFR SERPL CREATININE-BSD FRML MDRD: >150 ML/MIN/1.73
GLOBULIN UR ELPH-MCNC: 3.8 GM/DL
GLUCOSE SERPL-MCNC: 120 MG/DL (ref 65–99)
HCO3 BLDA-SCNC: 28 MMOL/L (ref 20–26)
HCT VFR BLD AUTO: 31.9 % (ref 34–46.6)
HGB BLD-MCNC: 10.4 G/DL (ref 12–15.9)
INHALED O2 CONCENTRATION: 80 %
IPAP: 16
L PNEUMO1 AG UR QL IA: NEGATIVE
LEFT ATRIUM VOLUME INDEX: 14.4 ML/M2
LEFT ATRIUM VOLUME: 25.7 CM3
LV EF 2D ECHO EST: 55 %
LYMPHOCYTES # BLD MANUAL: 0.7 10*3/MM3 (ref 0.7–3.1)
LYMPHOCYTES NFR BLD MANUAL: 5.1 % (ref 5–12)
LYMPHOCYTES NFR BLD MANUAL: 6.1 % (ref 19.6–45.3)
Lab: ABNORMAL
MAXIMAL PREDICTED HEART RATE: 197 BPM
MCH RBC QN AUTO: 27.7 PG (ref 26.6–33)
MCHC RBC AUTO-ENTMCNC: 32.6 G/DL (ref 31.5–35.7)
MCV RBC AUTO: 84.8 FL (ref 79–97)
MODALITY: ABNORMAL
MONOCYTES # BLD AUTO: 0.59 10*3/MM3 (ref 0.1–0.9)
NEUTROPHILS # BLD AUTO: 10.21 10*3/MM3 (ref 1.7–7)
NEUTROPHILS NFR BLD MANUAL: 84.8 % (ref 42.7–76)
NEUTS BAND NFR BLD MANUAL: 4 % (ref 0–5)
PCO2 BLDA: 36.5 MM HG (ref 35–45)
PCO2 TEMP ADJ BLD: 36.5 MM HG (ref 35–45)
PH BLDA: 7.49 PH UNITS (ref 7.35–7.45)
PH, TEMP CORRECTED: 7.49 PH UNITS (ref 7.35–7.45)
PLATELET # BLD AUTO: 398 10*3/MM3 (ref 140–450)
PMV BLD AUTO: 10.2 FL (ref 6–12)
PO2 BLDA: 91.1 MM HG (ref 83–108)
PO2 TEMP ADJ BLD: 91.1 MM HG (ref 83–108)
POIKILOCYTOSIS BLD QL SMEAR: ABNORMAL
POLYCHROMASIA BLD QL SMEAR: ABNORMAL
POTASSIUM SERPL-SCNC: 4.3 MMOL/L (ref 3.5–5.2)
PROT SERPL-MCNC: 6.4 G/DL (ref 6–8.5)
RBC # BLD AUTO: 3.76 10*6/MM3 (ref 3.77–5.28)
S PNEUM AG SPEC QL LA: NEGATIVE
SAO2 % BLDCOA: 97.9 % (ref 94–99)
SET MECH RESP RATE: 12
SODIUM SERPL-SCNC: 133 MMOL/L (ref 136–145)
STRESS TARGET HR: 167 BPM
VENTILATOR MODE: ABNORMAL
WBC # BLD AUTO: 11.49 10*3/MM3 (ref 3.4–10.8)
WBC MORPH BLD: NORMAL

## 2021-09-22 PROCEDURE — 94799 UNLISTED PULMONARY SVC/PX: CPT

## 2021-09-22 PROCEDURE — 25010000002 AZITHROMYCIN PER 500 MG: Performed by: INTERNAL MEDICINE

## 2021-09-22 PROCEDURE — 25010000002 ENOXAPARIN PER 10 MG: Performed by: FAMILY MEDICINE

## 2021-09-22 PROCEDURE — 85025 COMPLETE CBC W/AUTO DIFF WBC: CPT | Performed by: FAMILY MEDICINE

## 2021-09-22 PROCEDURE — 82803 BLOOD GASES ANY COMBINATION: CPT

## 2021-09-22 PROCEDURE — 25010000002 MEROPENEM PER 100 MG: Performed by: FAMILY MEDICINE

## 2021-09-22 PROCEDURE — 25010000002 DEXAMETHASONE PER 1 MG: Performed by: NURSE PRACTITIONER

## 2021-09-22 PROCEDURE — 36600 WITHDRAWAL OF ARTERIAL BLOOD: CPT

## 2021-09-22 PROCEDURE — 71045 X-RAY EXAM CHEST 1 VIEW: CPT

## 2021-09-22 PROCEDURE — 87581 M.PNEUMON DNA AMP PROBE: CPT | Performed by: FAMILY MEDICINE

## 2021-09-22 PROCEDURE — 99231 SBSQ HOSP IP/OBS SF/LOW 25: CPT | Performed by: THORACIC SURGERY (CARDIOTHORACIC VASCULAR SURGERY)

## 2021-09-22 PROCEDURE — 93306 TTE W/DOPPLER COMPLETE: CPT

## 2021-09-22 PROCEDURE — 99233 SBSQ HOSP IP/OBS HIGH 50: CPT | Performed by: INTERNAL MEDICINE

## 2021-09-22 PROCEDURE — 80053 COMPREHEN METABOLIC PANEL: CPT | Performed by: FAMILY MEDICINE

## 2021-09-22 PROCEDURE — 25010000002 ONDANSETRON PER 1 MG: Performed by: INTERNAL MEDICINE

## 2021-09-22 PROCEDURE — 25010000002 VANCOMYCIN 10 G RECONSTITUTED SOLUTION: Performed by: FAMILY MEDICINE

## 2021-09-22 PROCEDURE — 85007 BL SMEAR W/DIFF WBC COUNT: CPT | Performed by: FAMILY MEDICINE

## 2021-09-22 PROCEDURE — 93306 TTE W/DOPPLER COMPLETE: CPT | Performed by: INTERNAL MEDICINE

## 2021-09-22 PROCEDURE — 94640 AIRWAY INHALATION TREATMENT: CPT

## 2021-09-22 RX ORDER — DEXAMETHASONE SODIUM PHOSPHATE 10 MG/ML
6 INJECTION INTRAMUSCULAR; INTRAVENOUS DAILY
Status: DISCONTINUED | OUTPATIENT
Start: 2021-09-22 | End: 2021-09-23

## 2021-09-22 RX ADMIN — ALBUTEROL SULFATE 2 PUFF: 108 AEROSOL, METERED RESPIRATORY (INHALATION) at 10:11

## 2021-09-22 RX ADMIN — DEXAMETHASONE SODIUM PHOSPHATE 6 MG: 10 INJECTION INTRAMUSCULAR; INTRAVENOUS at 09:00

## 2021-09-22 RX ADMIN — HYDROCODONE BITARTRATE AND ACETAMINOPHEN 1 TABLET: 7.5; 325 TABLET ORAL at 15:18

## 2021-09-22 RX ADMIN — ACETAMINOPHEN 650 MG: 325 TABLET, FILM COATED ORAL at 06:42

## 2021-09-22 RX ADMIN — ALBUTEROL SULFATE 2 PUFF: 108 AEROSOL, METERED RESPIRATORY (INHALATION) at 14:39

## 2021-09-22 RX ADMIN — HYDROCODONE BITARTRATE AND ACETAMINOPHEN 1 TABLET: 7.5; 325 TABLET ORAL at 06:42

## 2021-09-22 RX ADMIN — ENOXAPARIN SODIUM 40 MG: 40 INJECTION SUBCUTANEOUS at 21:08

## 2021-09-22 RX ADMIN — ALBUTEROL SULFATE 2 PUFF: 108 AEROSOL, METERED RESPIRATORY (INHALATION) at 06:34

## 2021-09-22 RX ADMIN — MEROPENEM 1 G: 1 INJECTION, POWDER, FOR SOLUTION INTRAVENOUS at 15:18

## 2021-09-22 RX ADMIN — ALBUTEROL SULFATE 2 PUFF: 108 AEROSOL, METERED RESPIRATORY (INHALATION) at 19:31

## 2021-09-22 RX ADMIN — ONDANSETRON 4 MG: 2 INJECTION INTRAMUSCULAR; INTRAVENOUS at 06:43

## 2021-09-22 RX ADMIN — VANCOMYCIN HYDROCHLORIDE 1250 MG: 10 INJECTION, POWDER, LYOPHILIZED, FOR SOLUTION INTRAVENOUS at 21:08

## 2021-09-22 RX ADMIN — MEROPENEM 1 G: 1 INJECTION, POWDER, FOR SOLUTION INTRAVENOUS at 06:43

## 2021-09-22 RX ADMIN — SODIUM CHLORIDE, PRESERVATIVE FREE 10 ML: 5 INJECTION INTRAVENOUS at 06:42

## 2021-09-22 RX ADMIN — AZITHROMYCIN DIHYDRATE 500 MG: 500 INJECTION, POWDER, LYOPHILIZED, FOR SOLUTION INTRAVENOUS at 18:55

## 2021-09-22 RX ADMIN — VANCOMYCIN HYDROCHLORIDE 1250 MG: 10 INJECTION, POWDER, LYOPHILIZED, FOR SOLUTION INTRAVENOUS at 09:01

## 2021-09-22 RX ADMIN — MEROPENEM 1 G: 1 INJECTION, POWDER, FOR SOLUTION INTRAVENOUS at 23:11

## 2021-09-22 NOTE — OUTREACH NOTE
COVID-19 Week 2 Survey      Responses   [facility] patient discharged from?  [place]   Does the patient have one of the following disease processes/diagnoses(primary or secondary)?  COVID-19   COVID-19 underlying condition?  None   COVID-19 Week 2: Call 1 attempt successful?  No   Revoke  Readmitted          Zoila Jha RN

## 2021-09-23 ENCOUNTER — APPOINTMENT (OUTPATIENT)
Dept: GENERAL RADIOLOGY | Facility: HOSPITAL | Age: 21
End: 2021-09-23

## 2021-09-23 PROBLEM — J18.9 PNEUMONIA OF RIGHT LOWER LOBE DUE TO INFECTIOUS ORGANISM: Status: ACTIVE | Noted: 2021-09-21

## 2021-09-23 LAB
QT INTERVAL: 338 MS
QTC INTERVAL: 461 MS
VANCOMYCIN TROUGH SERPL-MCNC: <4 MCG/ML (ref 5–20)

## 2021-09-23 PROCEDURE — 25010000002 DEXAMETHASONE PER 1 MG: Performed by: NURSE PRACTITIONER

## 2021-09-23 PROCEDURE — 71045 X-RAY EXAM CHEST 1 VIEW: CPT

## 2021-09-23 PROCEDURE — 94799 UNLISTED PULMONARY SVC/PX: CPT

## 2021-09-23 PROCEDURE — 25010000002 VANCOMYCIN 10 G RECONSTITUTED SOLUTION: Performed by: FAMILY MEDICINE

## 2021-09-23 PROCEDURE — 80202 ASSAY OF VANCOMYCIN: CPT | Performed by: FAMILY MEDICINE

## 2021-09-23 PROCEDURE — 25010000002 AZITHROMYCIN PER 500 MG: Performed by: INTERNAL MEDICINE

## 2021-09-23 PROCEDURE — 99233 SBSQ HOSP IP/OBS HIGH 50: CPT | Performed by: INTERNAL MEDICINE

## 2021-09-23 PROCEDURE — 25010000002 ENOXAPARIN PER 10 MG: Performed by: FAMILY MEDICINE

## 2021-09-23 PROCEDURE — 99231 SBSQ HOSP IP/OBS SF/LOW 25: CPT | Performed by: NURSE PRACTITIONER

## 2021-09-23 PROCEDURE — 25010000002 MEROPENEM PER 100 MG: Performed by: FAMILY MEDICINE

## 2021-09-23 RX ORDER — BUDESONIDE AND FORMOTEROL FUMARATE DIHYDRATE 160; 4.5 UG/1; UG/1
2 AEROSOL RESPIRATORY (INHALATION)
Status: DISCONTINUED | OUTPATIENT
Start: 2021-09-23 | End: 2021-10-06 | Stop reason: HOSPADM

## 2021-09-23 RX ORDER — DEXAMETHASONE SODIUM PHOSPHATE 4 MG/ML
4 INJECTION, SOLUTION INTRA-ARTICULAR; INTRALESIONAL; INTRAMUSCULAR; INTRAVENOUS; SOFT TISSUE DAILY
Status: COMPLETED | OUTPATIENT
Start: 2021-09-24 | End: 2021-09-24

## 2021-09-23 RX ORDER — GUAIFENESIN 600 MG/1
1200 TABLET, EXTENDED RELEASE ORAL 2 TIMES DAILY
Status: DISCONTINUED | OUTPATIENT
Start: 2021-09-23 | End: 2021-10-06 | Stop reason: HOSPADM

## 2021-09-23 RX ORDER — DEXAMETHASONE SODIUM PHOSPHATE 4 MG/ML
2 INJECTION, SOLUTION INTRA-ARTICULAR; INTRALESIONAL; INTRAMUSCULAR; INTRAVENOUS; SOFT TISSUE ONCE
Status: COMPLETED | OUTPATIENT
Start: 2021-09-25 | End: 2021-09-25

## 2021-09-23 RX ORDER — BUPIVACAINE HCL/0.9 % NACL/PF 0.1 %
2 PLASTIC BAG, INJECTION (ML) EPIDURAL ONCE
Status: CANCELLED | OUTPATIENT
Start: 2021-09-24

## 2021-09-23 RX ORDER — CEFAZOLIN SODIUM 2 G/50ML
2 SOLUTION INTRAVENOUS ONCE
Status: DISCONTINUED | OUTPATIENT
Start: 2021-09-24 | End: 2021-09-23

## 2021-09-23 RX ADMIN — ALBUTEROL SULFATE 2 PUFF: 108 AEROSOL, METERED RESPIRATORY (INHALATION) at 19:43

## 2021-09-23 RX ADMIN — BUDESONIDE AND FORMOTEROL FUMARATE DIHYDRATE 2 PUFF: 160; 4.5 AEROSOL RESPIRATORY (INHALATION) at 19:44

## 2021-09-23 RX ADMIN — ALBUTEROL SULFATE 2 PUFF: 108 AEROSOL, METERED RESPIRATORY (INHALATION) at 13:36

## 2021-09-23 RX ADMIN — MEROPENEM 1 G: 1 INJECTION, POWDER, FOR SOLUTION INTRAVENOUS at 23:31

## 2021-09-23 RX ADMIN — DEXAMETHASONE SODIUM PHOSPHATE 6 MG: 10 INJECTION INTRAMUSCULAR; INTRAVENOUS at 08:06

## 2021-09-23 RX ADMIN — ALBUTEROL SULFATE 2 PUFF: 108 AEROSOL, METERED RESPIRATORY (INHALATION) at 06:30

## 2021-09-23 RX ADMIN — VANCOMYCIN HYDROCHLORIDE 1250 MG: 10 INJECTION, POWDER, LYOPHILIZED, FOR SOLUTION INTRAVENOUS at 10:33

## 2021-09-23 RX ADMIN — ENOXAPARIN SODIUM 40 MG: 40 INJECTION SUBCUTANEOUS at 21:35

## 2021-09-23 RX ADMIN — MEROPENEM 1 G: 1 INJECTION, POWDER, FOR SOLUTION INTRAVENOUS at 14:35

## 2021-09-23 RX ADMIN — BUDESONIDE AND FORMOTEROL FUMARATE DIHYDRATE 2 PUFF: 160; 4.5 AEROSOL RESPIRATORY (INHALATION) at 09:47

## 2021-09-23 RX ADMIN — ALBUTEROL SULFATE 2 PUFF: 108 AEROSOL, METERED RESPIRATORY (INHALATION) at 09:47

## 2021-09-23 RX ADMIN — HYDROCODONE BITARTRATE AND ACETAMINOPHEN 1 TABLET: 7.5; 325 TABLET ORAL at 19:09

## 2021-09-23 RX ADMIN — MEROPENEM 1 G: 1 INJECTION, POWDER, FOR SOLUTION INTRAVENOUS at 06:42

## 2021-09-23 RX ADMIN — HYDROCODONE BITARTRATE AND ACETAMINOPHEN 1 TABLET: 7.5; 325 TABLET ORAL at 08:07

## 2021-09-23 RX ADMIN — AZITHROMYCIN DIHYDRATE 500 MG: 500 INJECTION, POWDER, LYOPHILIZED, FOR SOLUTION INTRAVENOUS at 21:35

## 2021-09-23 RX ADMIN — VANCOMYCIN HYDROCHLORIDE 1250 MG: 10 INJECTION, POWDER, LYOPHILIZED, FOR SOLUTION INTRAVENOUS at 21:34

## 2021-09-23 RX ADMIN — GUAIFENESIN 1200 MG: 600 TABLET, EXTENDED RELEASE ORAL at 10:33

## 2021-09-23 RX ADMIN — GUAIFENESIN 1200 MG: 600 TABLET, EXTENDED RELEASE ORAL at 21:35

## 2021-09-24 ENCOUNTER — APPOINTMENT (OUTPATIENT)
Dept: GENERAL RADIOLOGY | Facility: HOSPITAL | Age: 21
End: 2021-09-24

## 2021-09-24 ENCOUNTER — ANESTHESIA EVENT (OUTPATIENT)
Dept: PERIOP | Facility: HOSPITAL | Age: 21
End: 2021-09-24

## 2021-09-24 ENCOUNTER — ANESTHESIA (OUTPATIENT)
Dept: PERIOP | Facility: HOSPITAL | Age: 21
End: 2021-09-24

## 2021-09-24 LAB
ANION GAP SERPL CALCULATED.3IONS-SCNC: 10 MMOL/L (ref 5–15)
APTT PPP: 25.6 SECONDS (ref 24.1–35)
BACTERIA SPEC AEROBE CULT: ABNORMAL
BASOPHILS # BLD AUTO: 0.01 10*3/MM3 (ref 0–0.2)
BASOPHILS NFR BLD AUTO: 0.1 % (ref 0–1.5)
BUN SERPL-MCNC: 14 MG/DL (ref 6–20)
BUN/CREAT SERPL: 53.8 (ref 7–25)
CALCIUM SPEC-SCNC: 8.5 MG/DL (ref 8.6–10.5)
CHLORIDE SERPL-SCNC: 102 MMOL/L (ref 98–107)
CO2 SERPL-SCNC: 25 MMOL/L (ref 22–29)
CREAT SERPL-MCNC: 0.26 MG/DL (ref 0.57–1)
DEPRECATED RDW RBC AUTO: 45.3 FL (ref 37–54)
EOSINOPHIL # BLD AUTO: 0.03 10*3/MM3 (ref 0–0.4)
EOSINOPHIL NFR BLD AUTO: 0.3 % (ref 0.3–6.2)
ERYTHROCYTE [DISTWIDTH] IN BLOOD BY AUTOMATED COUNT: 14.9 % (ref 12.3–15.4)
GFR SERPL CREATININE-BSD FRML MDRD: >150 ML/MIN/1.73
GLUCOSE SERPL-MCNC: 93 MG/DL (ref 65–99)
GRAM STN SPEC: ABNORMAL
GRAM STN SPEC: ABNORMAL
HCT VFR BLD AUTO: 33.5 % (ref 34–46.6)
HGB BLD-MCNC: 10.8 G/DL (ref 12–15.9)
IMM GRANULOCYTES # BLD AUTO: 0.06 10*3/MM3 (ref 0–0.05)
IMM GRANULOCYTES NFR BLD AUTO: 0.6 % (ref 0–0.5)
INR PPP: 1.13 (ref 0.91–1.09)
ISOLATED FROM: ABNORMAL
LYMPHOCYTES # BLD AUTO: 1.35 10*3/MM3 (ref 0.7–3.1)
LYMPHOCYTES NFR BLD AUTO: 13.1 % (ref 19.6–45.3)
MCH RBC QN AUTO: 26.6 PG (ref 26.6–33)
MCHC RBC AUTO-ENTMCNC: 32.2 G/DL (ref 31.5–35.7)
MCV RBC AUTO: 82.5 FL (ref 79–97)
MONOCYTES # BLD AUTO: 0.58 10*3/MM3 (ref 0.1–0.9)
MONOCYTES NFR BLD AUTO: 5.6 % (ref 5–12)
NEUTROPHILS NFR BLD AUTO: 8.31 10*3/MM3 (ref 1.7–7)
NEUTROPHILS NFR BLD AUTO: 80.3 % (ref 42.7–76)
NRBC BLD AUTO-RTO: 0 /100 WBC (ref 0–0.2)
PLATELET # BLD AUTO: 497 10*3/MM3 (ref 140–450)
PMV BLD AUTO: 9.8 FL (ref 6–12)
POTASSIUM SERPL-SCNC: 4.2 MMOL/L (ref 3.5–5.2)
PROTHROMBIN TIME: 14.1 SECONDS (ref 11.9–14.6)
RBC # BLD AUTO: 4.06 10*6/MM3 (ref 3.77–5.28)
SODIUM SERPL-SCNC: 137 MMOL/L (ref 136–145)
VANCOMYCIN TROUGH SERPL-MCNC: 11.5 MCG/ML (ref 5–20)
WBC # BLD AUTO: 10.34 10*3/MM3 (ref 3.4–10.8)

## 2021-09-24 PROCEDURE — 87102 FUNGUS ISOLATION CULTURE: CPT | Performed by: THORACIC SURGERY (CARDIOTHORACIC VASCULAR SURGERY)

## 2021-09-24 PROCEDURE — 25010000002 ONDANSETRON PER 1 MG: Performed by: NURSE ANESTHETIST, CERTIFIED REGISTERED

## 2021-09-24 PROCEDURE — 25010000002 VANCOMYCIN 10 G RECONSTITUTED SOLUTION: Performed by: THORACIC SURGERY (CARDIOTHORACIC VASCULAR SURGERY)

## 2021-09-24 PROCEDURE — 99231 SBSQ HOSP IP/OBS SF/LOW 25: CPT | Performed by: THORACIC SURGERY (CARDIOTHORACIC VASCULAR SURGERY)

## 2021-09-24 PROCEDURE — 0B9F8ZX DRAINAGE OF RIGHT LOWER LUNG LOBE, VIA NATURAL OR ARTIFICIAL OPENING ENDOSCOPIC, DIAGNOSTIC: ICD-10-PCS | Performed by: THORACIC SURGERY (CARDIOTHORACIC VASCULAR SURGERY)

## 2021-09-24 PROCEDURE — 80202 ASSAY OF VANCOMYCIN: CPT | Performed by: FAMILY MEDICINE

## 2021-09-24 PROCEDURE — 71045 X-RAY EXAM CHEST 1 VIEW: CPT

## 2021-09-24 PROCEDURE — 87205 SMEAR GRAM STAIN: CPT | Performed by: THORACIC SURGERY (CARDIOTHORACIC VASCULAR SURGERY)

## 2021-09-24 PROCEDURE — 85025 COMPLETE CBC W/AUTO DIFF WBC: CPT | Performed by: NURSE PRACTITIONER

## 2021-09-24 PROCEDURE — 85730 THROMBOPLASTIN TIME PARTIAL: CPT | Performed by: NURSE PRACTITIONER

## 2021-09-24 PROCEDURE — 99232 SBSQ HOSP IP/OBS MODERATE 35: CPT | Performed by: INTERNAL MEDICINE

## 2021-09-24 PROCEDURE — 25010000002 DEXAMETHASONE PER 1 MG: Performed by: INTERNAL MEDICINE

## 2021-09-24 PROCEDURE — 94799 UNLISTED PULMONARY SVC/PX: CPT

## 2021-09-24 PROCEDURE — 25010000002 DEXAMETHASONE PER 1 MG: Performed by: NURSE ANESTHETIST, CERTIFIED REGISTERED

## 2021-09-24 PROCEDURE — 80048 BASIC METABOLIC PNL TOTAL CA: CPT | Performed by: NURSE PRACTITIONER

## 2021-09-24 PROCEDURE — 87070 CULTURE OTHR SPECIMN AEROBIC: CPT | Performed by: THORACIC SURGERY (CARDIOTHORACIC VASCULAR SURGERY)

## 2021-09-24 PROCEDURE — 87147 CULTURE TYPE IMMUNOLOGIC: CPT | Performed by: THORACIC SURGERY (CARDIOTHORACIC VASCULAR SURGERY)

## 2021-09-24 PROCEDURE — 25010000002 ONDANSETRON PER 1 MG: Performed by: INTERNAL MEDICINE

## 2021-09-24 PROCEDURE — 85610 PROTHROMBIN TIME: CPT | Performed by: NURSE PRACTITIONER

## 2021-09-24 PROCEDURE — 87071 CULTURE AEROBIC QUANT OTHER: CPT | Performed by: THORACIC SURGERY (CARDIOTHORACIC VASCULAR SURGERY)

## 2021-09-24 PROCEDURE — 25010000002 VANCOMYCIN 10 G RECONSTITUTED SOLUTION: Performed by: FAMILY MEDICINE

## 2021-09-24 PROCEDURE — 25010000002 ENOXAPARIN PER 10 MG: Performed by: THORACIC SURGERY (CARDIOTHORACIC VASCULAR SURGERY)

## 2021-09-24 PROCEDURE — 31624 DX BRONCHOSCOPE/LAVAGE: CPT | Performed by: THORACIC SURGERY (CARDIOTHORACIC VASCULAR SURGERY)

## 2021-09-24 PROCEDURE — 87186 SC STD MICRODIL/AGAR DIL: CPT | Performed by: THORACIC SURGERY (CARDIOTHORACIC VASCULAR SURGERY)

## 2021-09-24 PROCEDURE — 25010000002 PROPOFOL 10 MG/ML EMULSION: Performed by: NURSE ANESTHETIST, CERTIFIED REGISTERED

## 2021-09-24 PROCEDURE — 25010000002 MEROPENEM PER 100 MG: Performed by: FAMILY MEDICINE

## 2021-09-24 RX ORDER — SODIUM CHLORIDE, SODIUM LACTATE, POTASSIUM CHLORIDE, CALCIUM CHLORIDE 600; 310; 30; 20 MG/100ML; MG/100ML; MG/100ML; MG/100ML
INJECTION, SOLUTION INTRAVENOUS CONTINUOUS PRN
Status: DISCONTINUED | OUTPATIENT
Start: 2021-09-24 | End: 2021-09-24 | Stop reason: SURG

## 2021-09-24 RX ORDER — HYDROMORPHONE HYDROCHLORIDE 1 MG/ML
0.5 INJECTION, SOLUTION INTRAMUSCULAR; INTRAVENOUS; SUBCUTANEOUS
Status: DISCONTINUED | OUTPATIENT
Start: 2021-09-24 | End: 2021-09-25 | Stop reason: HOSPADM

## 2021-09-24 RX ORDER — PROPOFOL 10 MG/ML
VIAL (ML) INTRAVENOUS AS NEEDED
Status: DISCONTINUED | OUTPATIENT
Start: 2021-09-24 | End: 2021-09-24 | Stop reason: SURG

## 2021-09-24 RX ORDER — ROCURONIUM BROMIDE 10 MG/ML
INJECTION, SOLUTION INTRAVENOUS AS NEEDED
Status: DISCONTINUED | OUTPATIENT
Start: 2021-09-24 | End: 2021-09-24 | Stop reason: SURG

## 2021-09-24 RX ORDER — FENTANYL CITRATE 50 UG/ML
25 INJECTION, SOLUTION INTRAMUSCULAR; INTRAVENOUS
Status: DISCONTINUED | OUTPATIENT
Start: 2021-09-24 | End: 2021-09-25 | Stop reason: HOSPADM

## 2021-09-24 RX ORDER — FLUMAZENIL 0.1 MG/ML
0.2 INJECTION INTRAVENOUS AS NEEDED
Status: DISCONTINUED | OUTPATIENT
Start: 2021-09-24 | End: 2021-09-25 | Stop reason: HOSPADM

## 2021-09-24 RX ORDER — OXYCODONE AND ACETAMINOPHEN 10; 325 MG/1; MG/1
1 TABLET ORAL ONCE AS NEEDED
Status: COMPLETED | OUTPATIENT
Start: 2021-09-24 | End: 2021-09-26

## 2021-09-24 RX ORDER — LABETALOL HYDROCHLORIDE 5 MG/ML
5 INJECTION, SOLUTION INTRAVENOUS
Status: DISCONTINUED | OUTPATIENT
Start: 2021-09-24 | End: 2021-09-25 | Stop reason: HOSPADM

## 2021-09-24 RX ORDER — ONDANSETRON 2 MG/ML
4 INJECTION INTRAMUSCULAR; INTRAVENOUS AS NEEDED
Status: DISCONTINUED | OUTPATIENT
Start: 2021-09-24 | End: 2021-09-24 | Stop reason: HOSPADM

## 2021-09-24 RX ORDER — SUCCINYLCHOLINE/SOD CL,ISO/PF 200MG/10ML
SYRINGE (ML) INTRAVENOUS AS NEEDED
Status: DISCONTINUED | OUTPATIENT
Start: 2021-09-24 | End: 2021-09-24 | Stop reason: SURG

## 2021-09-24 RX ORDER — LIDOCAINE HYDROCHLORIDE 20 MG/ML
INJECTION, SOLUTION EPIDURAL; INFILTRATION; INTRACAUDAL; PERINEURAL AS NEEDED
Status: DISCONTINUED | OUTPATIENT
Start: 2021-09-24 | End: 2021-09-24 | Stop reason: SURG

## 2021-09-24 RX ORDER — ONDANSETRON 2 MG/ML
INJECTION INTRAMUSCULAR; INTRAVENOUS AS NEEDED
Status: DISCONTINUED | OUTPATIENT
Start: 2021-09-24 | End: 2021-09-24 | Stop reason: SURG

## 2021-09-24 RX ORDER — DEXAMETHASONE SODIUM PHOSPHATE 4 MG/ML
INJECTION, SOLUTION INTRA-ARTICULAR; INTRALESIONAL; INTRAMUSCULAR; INTRAVENOUS; SOFT TISSUE AS NEEDED
Status: DISCONTINUED | OUTPATIENT
Start: 2021-09-24 | End: 2021-09-24 | Stop reason: SURG

## 2021-09-24 RX ORDER — NALOXONE HCL 0.4 MG/ML
0.04 VIAL (ML) INJECTION AS NEEDED
Status: DISCONTINUED | OUTPATIENT
Start: 2021-09-24 | End: 2021-09-25 | Stop reason: HOSPADM

## 2021-09-24 RX ADMIN — SODIUM CHLORIDE, POTASSIUM CHLORIDE, SODIUM LACTATE AND CALCIUM CHLORIDE: 600; 310; 30; 20 INJECTION, SOLUTION INTRAVENOUS at 16:48

## 2021-09-24 RX ADMIN — GUAIFENESIN 1200 MG: 600 TABLET, EXTENDED RELEASE ORAL at 09:33

## 2021-09-24 RX ADMIN — DEXAMETHASONE SODIUM PHOSPHATE 4 MG: 4 INJECTION, SOLUTION INTRA-ARTICULAR; INTRALESIONAL; INTRAMUSCULAR; INTRAVENOUS; SOFT TISSUE at 09:36

## 2021-09-24 RX ADMIN — ALBUTEROL SULFATE 2 PUFF: 108 AEROSOL, METERED RESPIRATORY (INHALATION) at 20:53

## 2021-09-24 RX ADMIN — ROCURONIUM BROMIDE 5 MG: 50 INJECTION INTRAVENOUS at 16:53

## 2021-09-24 RX ADMIN — ALBUTEROL SULFATE 2 PUFF: 108 AEROSOL, METERED RESPIRATORY (INHALATION) at 15:33

## 2021-09-24 RX ADMIN — VANCOMYCIN HYDROCHLORIDE 1250 MG: 10 INJECTION, POWDER, LYOPHILIZED, FOR SOLUTION INTRAVENOUS at 11:37

## 2021-09-24 RX ADMIN — DEXAMETHASONE SODIUM PHOSPHATE 4 MG: 4 INJECTION, SOLUTION INTRA-ARTICULAR; INTRALESIONAL; INTRAMUSCULAR; INTRAVENOUS; SOFT TISSUE at 16:53

## 2021-09-24 RX ADMIN — Medication 100 MG: at 16:53

## 2021-09-24 RX ADMIN — MEROPENEM 1 G: 1 INJECTION, POWDER, FOR SOLUTION INTRAVENOUS at 06:38

## 2021-09-24 RX ADMIN — PROPOFOL 150 MG: 10 INJECTION, EMULSION INTRAVENOUS at 16:53

## 2021-09-24 RX ADMIN — GUAIFENESIN 1200 MG: 600 TABLET, EXTENDED RELEASE ORAL at 21:57

## 2021-09-24 RX ADMIN — BUDESONIDE AND FORMOTEROL FUMARATE DIHYDRATE 2 PUFF: 160; 4.5 AEROSOL RESPIRATORY (INHALATION) at 20:55

## 2021-09-24 RX ADMIN — SODIUM CHLORIDE, PRESERVATIVE FREE 10 ML: 5 INJECTION INTRAVENOUS at 21:57

## 2021-09-24 RX ADMIN — HYDROCODONE BITARTRATE AND ACETAMINOPHEN 1 TABLET: 7.5; 325 TABLET ORAL at 09:46

## 2021-09-24 RX ADMIN — ONDANSETRON 4 MG: 2 INJECTION INTRAMUSCULAR; INTRAVENOUS at 16:53

## 2021-09-24 RX ADMIN — MEROPENEM 1 G: 1 INJECTION, POWDER, FOR SOLUTION INTRAVENOUS at 15:53

## 2021-09-24 RX ADMIN — VANCOMYCIN HYDROCHLORIDE 1250 MG: 10 INJECTION, POWDER, LYOPHILIZED, FOR SOLUTION INTRAVENOUS at 22:01

## 2021-09-24 RX ADMIN — BUDESONIDE AND FORMOTEROL FUMARATE DIHYDRATE 2 PUFF: 160; 4.5 AEROSOL RESPIRATORY (INHALATION) at 07:19

## 2021-09-24 RX ADMIN — ONDANSETRON 4 MG: 2 INJECTION INTRAMUSCULAR; INTRAVENOUS at 09:46

## 2021-09-24 RX ADMIN — LIDOCAINE HYDROCHLORIDE 80 MG: 20 INJECTION, SOLUTION EPIDURAL; INFILTRATION; INTRACAUDAL; PERINEURAL at 16:53

## 2021-09-24 RX ADMIN — ALBUTEROL SULFATE 2 PUFF: 108 AEROSOL, METERED RESPIRATORY (INHALATION) at 11:47

## 2021-09-24 RX ADMIN — ALBUTEROL SULFATE 2 PUFF: 108 AEROSOL, METERED RESPIRATORY (INHALATION) at 07:19

## 2021-09-24 RX ADMIN — VANCOMYCIN HYDROCHLORIDE 1250 MG: 10 INJECTION, POWDER, LYOPHILIZED, FOR SOLUTION INTRAVENOUS at 03:28

## 2021-09-24 RX ADMIN — ENOXAPARIN SODIUM 40 MG: 40 INJECTION SUBCUTANEOUS at 21:56

## 2021-09-24 NOTE — ANESTHESIA PROCEDURE NOTES
Airway  Urgency: elective    Date/Time: 9/24/2021 4:53 PM  Airway not difficult    General Information and Staff    Patient location during procedure: OR  CRNA: Frederic Luo CRNA    Indications and Patient Condition  Indications for airway management: airway protection    Preoxygenated: yes  MILS maintained throughout  Mask difficulty assessment: 1 - vent by mask    Final Airway Details  Final airway type: endotracheal airway      Successful airway: ETT  Cuffed: yes   Successful intubation technique: direct laryngoscopy  Facilitating devices/methods: intubating stylet  Endotracheal tube insertion site: oral  Blade: Partha  Blade size: 4  ETT size (mm): 8.0  Cormack-Lehane Classification: grade I - full view of glottis  Placement verified by: chest auscultation and capnometry   Cuff volume (mL): 8  Measured from: lips  ETT/EBT  to lips (cm): 21  Number of attempts at approach: 1  Assessment: lips, teeth, and gum same as pre-op and atraumatic intubation

## 2021-09-24 NOTE — ANESTHESIA PREPROCEDURE EVALUATION
Anesthesia Evaluation     no history of anesthetic complications:  NPO Solid Status: > 8 hours  NPO Liquid Status: > 8 hours           Airway   Mallampati: I  TM distance: >3 FB  No difficulty expected  Dental      Pulmonary    (+) pneumonia (covid) ,   Cardiovascular   Exercise tolerance: good (4-7 METS)    (-) valvular problems/murmurs, CAD, dysrhythmias      Neuro/Psych  GI/Hepatic/Renal/Endo    (-) liver disease, no renal disease, diabetes, no thyroid disorder    Musculoskeletal     Abdominal    Substance History      OB/GYN          Other                          Anesthesia Plan    ASA 3 - emergent     general     intravenous induction     Anesthetic plan, all risks, benefits, and alternatives have been provided, discussed and informed consent has been obtained with: patient.

## 2021-09-25 ENCOUNTER — APPOINTMENT (OUTPATIENT)
Dept: GENERAL RADIOLOGY | Facility: HOSPITAL | Age: 21
End: 2021-09-25

## 2021-09-25 PROCEDURE — 71045 X-RAY EXAM CHEST 1 VIEW: CPT

## 2021-09-25 PROCEDURE — 94799 UNLISTED PULMONARY SVC/PX: CPT

## 2021-09-25 PROCEDURE — 25010000002 ENOXAPARIN PER 10 MG: Performed by: THORACIC SURGERY (CARDIOTHORACIC VASCULAR SURGERY)

## 2021-09-25 PROCEDURE — 25010000002 DEXAMETHASONE PER 1 MG: Performed by: THORACIC SURGERY (CARDIOTHORACIC VASCULAR SURGERY)

## 2021-09-25 PROCEDURE — 99231 SBSQ HOSP IP/OBS SF/LOW 25: CPT | Performed by: THORACIC SURGERY (CARDIOTHORACIC VASCULAR SURGERY)

## 2021-09-25 PROCEDURE — 25010000002 MEROPENEM PER 100 MG: Performed by: THORACIC SURGERY (CARDIOTHORACIC VASCULAR SURGERY)

## 2021-09-25 PROCEDURE — 25010000002 VANCOMYCIN 10 G RECONSTITUTED SOLUTION: Performed by: THORACIC SURGERY (CARDIOTHORACIC VASCULAR SURGERY)

## 2021-09-25 RX ADMIN — BUDESONIDE AND FORMOTEROL FUMARATE DIHYDRATE 2 PUFF: 160; 4.5 AEROSOL RESPIRATORY (INHALATION) at 07:48

## 2021-09-25 RX ADMIN — ALBUTEROL SULFATE 2 PUFF: 108 AEROSOL, METERED RESPIRATORY (INHALATION) at 18:51

## 2021-09-25 RX ADMIN — ENOXAPARIN SODIUM 40 MG: 40 INJECTION SUBCUTANEOUS at 21:35

## 2021-09-25 RX ADMIN — MEROPENEM 1 G: 1 INJECTION, POWDER, FOR SOLUTION INTRAVENOUS at 09:05

## 2021-09-25 RX ADMIN — MEROPENEM 1 G: 1 INJECTION, POWDER, FOR SOLUTION INTRAVENOUS at 01:32

## 2021-09-25 RX ADMIN — MEROPENEM 1 G: 1 INJECTION, POWDER, FOR SOLUTION INTRAVENOUS at 16:53

## 2021-09-25 RX ADMIN — GUAIFENESIN 1200 MG: 600 TABLET, EXTENDED RELEASE ORAL at 09:06

## 2021-09-25 RX ADMIN — VANCOMYCIN HYDROCHLORIDE 1250 MG: 10 INJECTION, POWDER, LYOPHILIZED, FOR SOLUTION INTRAVENOUS at 06:10

## 2021-09-25 RX ADMIN — GUAIFENESIN 1200 MG: 600 TABLET, EXTENDED RELEASE ORAL at 21:35

## 2021-09-25 RX ADMIN — ALBUTEROL SULFATE 2 PUFF: 108 AEROSOL, METERED RESPIRATORY (INHALATION) at 07:48

## 2021-09-25 RX ADMIN — BUDESONIDE AND FORMOTEROL FUMARATE DIHYDRATE 2 PUFF: 160; 4.5 AEROSOL RESPIRATORY (INHALATION) at 18:53

## 2021-09-25 RX ADMIN — VANCOMYCIN HYDROCHLORIDE 1250 MG: 10 INJECTION, POWDER, LYOPHILIZED, FOR SOLUTION INTRAVENOUS at 21:38

## 2021-09-25 RX ADMIN — DEXAMETHASONE SODIUM PHOSPHATE 2 MG: 4 INJECTION, SOLUTION INTRA-ARTICULAR; INTRALESIONAL; INTRAMUSCULAR; INTRAVENOUS; SOFT TISSUE at 09:06

## 2021-09-25 RX ADMIN — ALBUTEROL SULFATE 2 PUFF: 108 AEROSOL, METERED RESPIRATORY (INHALATION) at 10:44

## 2021-09-25 RX ADMIN — VANCOMYCIN HYDROCHLORIDE 1250 MG: 10 INJECTION, POWDER, LYOPHILIZED, FOR SOLUTION INTRAVENOUS at 14:34

## 2021-09-25 RX ADMIN — ALBUTEROL SULFATE 2 PUFF: 108 AEROSOL, METERED RESPIRATORY (INHALATION) at 14:38

## 2021-09-26 ENCOUNTER — APPOINTMENT (OUTPATIENT)
Dept: GENERAL RADIOLOGY | Facility: HOSPITAL | Age: 21
End: 2021-09-26

## 2021-09-26 LAB
BACTERIA SPEC AEROBE CULT: NORMAL
BACTERIA SPEC RESP CULT: NORMAL
GRAM STN SPEC: NORMAL
VANCOMYCIN TROUGH SERPL-MCNC: 10.6 MCG/ML (ref 5–20)

## 2021-09-26 PROCEDURE — 94799 UNLISTED PULMONARY SVC/PX: CPT

## 2021-09-26 PROCEDURE — 99231 SBSQ HOSP IP/OBS SF/LOW 25: CPT | Performed by: THORACIC SURGERY (CARDIOTHORACIC VASCULAR SURGERY)

## 2021-09-26 PROCEDURE — 25010000002 ENOXAPARIN PER 10 MG: Performed by: THORACIC SURGERY (CARDIOTHORACIC VASCULAR SURGERY)

## 2021-09-26 PROCEDURE — 71045 X-RAY EXAM CHEST 1 VIEW: CPT

## 2021-09-26 PROCEDURE — 80202 ASSAY OF VANCOMYCIN: CPT | Performed by: FAMILY MEDICINE

## 2021-09-26 PROCEDURE — 25010000002 VANCOMYCIN 10 G RECONSTITUTED SOLUTION: Performed by: THORACIC SURGERY (CARDIOTHORACIC VASCULAR SURGERY)

## 2021-09-26 RX ORDER — DOCUSATE SODIUM 100 MG/1
100 CAPSULE, LIQUID FILLED ORAL DAILY
Status: DISCONTINUED | OUTPATIENT
Start: 2021-09-27 | End: 2021-10-06 | Stop reason: HOSPADM

## 2021-09-26 RX ADMIN — SODIUM CHLORIDE 500 ML: 9 INJECTION, SOLUTION INTRAVENOUS at 09:17

## 2021-09-26 RX ADMIN — GUAIFENESIN 1200 MG: 600 TABLET, EXTENDED RELEASE ORAL at 08:42

## 2021-09-26 RX ADMIN — VANCOMYCIN HYDROCHLORIDE 1250 MG: 10 INJECTION, POWDER, LYOPHILIZED, FOR SOLUTION INTRAVENOUS at 21:19

## 2021-09-26 RX ADMIN — VANCOMYCIN HYDROCHLORIDE 1250 MG: 10 INJECTION, POWDER, LYOPHILIZED, FOR SOLUTION INTRAVENOUS at 05:46

## 2021-09-26 RX ADMIN — OXYCODONE AND ACETAMINOPHEN 1 TABLET: 325; 10 TABLET ORAL at 05:51

## 2021-09-26 RX ADMIN — ALBUTEROL SULFATE 2 PUFF: 108 AEROSOL, METERED RESPIRATORY (INHALATION) at 14:42

## 2021-09-26 RX ADMIN — VANCOMYCIN HYDROCHLORIDE 1250 MG: 10 INJECTION, POWDER, LYOPHILIZED, FOR SOLUTION INTRAVENOUS at 16:19

## 2021-09-26 RX ADMIN — BUDESONIDE AND FORMOTEROL FUMARATE DIHYDRATE 2 PUFF: 160; 4.5 AEROSOL RESPIRATORY (INHALATION) at 20:29

## 2021-09-26 RX ADMIN — BUDESONIDE AND FORMOTEROL FUMARATE DIHYDRATE 2 PUFF: 160; 4.5 AEROSOL RESPIRATORY (INHALATION) at 06:40

## 2021-09-26 RX ADMIN — SODIUM CHLORIDE, PRESERVATIVE FREE 10 ML: 5 INJECTION INTRAVENOUS at 20:53

## 2021-09-26 RX ADMIN — ALBUTEROL SULFATE 2 PUFF: 108 AEROSOL, METERED RESPIRATORY (INHALATION) at 06:41

## 2021-09-26 RX ADMIN — HYDROCODONE BITARTRATE AND ACETAMINOPHEN 1 TABLET: 7.5; 325 TABLET ORAL at 16:35

## 2021-09-26 RX ADMIN — ALBUTEROL SULFATE 2 PUFF: 108 AEROSOL, METERED RESPIRATORY (INHALATION) at 10:44

## 2021-09-26 RX ADMIN — ALBUTEROL SULFATE 2 PUFF: 108 AEROSOL, METERED RESPIRATORY (INHALATION) at 20:30

## 2021-09-26 RX ADMIN — GUAIFENESIN 1200 MG: 600 TABLET, EXTENDED RELEASE ORAL at 20:53

## 2021-09-26 RX ADMIN — ENOXAPARIN SODIUM 40 MG: 40 INJECTION SUBCUTANEOUS at 20:53

## 2021-09-27 ENCOUNTER — APPOINTMENT (OUTPATIENT)
Dept: GENERAL RADIOLOGY | Facility: HOSPITAL | Age: 21
End: 2021-09-27

## 2021-09-27 PROBLEM — J93.9 PNEUMOTHORAX: Status: ACTIVE | Noted: 2021-09-27

## 2021-09-27 PROBLEM — T17.500A MUCUS PLUGGING OF BRONCHI: Status: ACTIVE | Noted: 2021-09-27

## 2021-09-27 LAB
BACTERIA SPEC AEROBE CULT: ABNORMAL
GRAM STN SPEC: ABNORMAL
M PNEUMO DNA SPEC QL NAA+PROBE: NEGATIVE

## 2021-09-27 PROCEDURE — 83036 HEMOGLOBIN GLYCOSYLATED A1C: CPT | Performed by: FAMILY MEDICINE

## 2021-09-27 PROCEDURE — 84443 ASSAY THYROID STIM HORMONE: CPT | Performed by: FAMILY MEDICINE

## 2021-09-27 PROCEDURE — 86140 C-REACTIVE PROTEIN: CPT | Performed by: FAMILY MEDICINE

## 2021-09-27 PROCEDURE — 94799 UNLISTED PULMONARY SVC/PX: CPT

## 2021-09-27 PROCEDURE — 25010000002 VANCOMYCIN 10 G RECONSTITUTED SOLUTION: Performed by: THORACIC SURGERY (CARDIOTHORACIC VASCULAR SURGERY)

## 2021-09-27 PROCEDURE — 25010000002 VANCOMYCIN 10 G RECONSTITUTED SOLUTION: Performed by: INTERNAL MEDICINE

## 2021-09-27 PROCEDURE — 80061 LIPID PANEL: CPT | Performed by: FAMILY MEDICINE

## 2021-09-27 PROCEDURE — 80048 BASIC METABOLIC PNL TOTAL CA: CPT | Performed by: FAMILY MEDICINE

## 2021-09-27 PROCEDURE — 25010000002 ENOXAPARIN PER 10 MG: Performed by: THORACIC SURGERY (CARDIOTHORACIC VASCULAR SURGERY)

## 2021-09-27 PROCEDURE — 71045 X-RAY EXAM CHEST 1 VIEW: CPT

## 2021-09-27 PROCEDURE — 85027 COMPLETE CBC AUTOMATED: CPT | Performed by: FAMILY MEDICINE

## 2021-09-27 PROCEDURE — 99024 POSTOP FOLLOW-UP VISIT: CPT | Performed by: NURSE PRACTITIONER

## 2021-09-27 PROCEDURE — 99231 SBSQ HOSP IP/OBS SF/LOW 25: CPT | Performed by: THORACIC SURGERY (CARDIOTHORACIC VASCULAR SURGERY)

## 2021-09-27 RX ORDER — FAMOTIDINE 20 MG/1
20 TABLET, FILM COATED ORAL 2 TIMES DAILY
Status: DISCONTINUED | OUTPATIENT
Start: 2021-09-27 | End: 2021-10-06 | Stop reason: HOSPADM

## 2021-09-27 RX ADMIN — ALBUTEROL SULFATE 2 PUFF: 108 AEROSOL, METERED RESPIRATORY (INHALATION) at 14:36

## 2021-09-27 RX ADMIN — VANCOMYCIN HYDROCHLORIDE 1250 MG: 10 INJECTION, POWDER, LYOPHILIZED, FOR SOLUTION INTRAVENOUS at 22:49

## 2021-09-27 RX ADMIN — FAMOTIDINE 20 MG: 20 TABLET, FILM COATED ORAL at 12:25

## 2021-09-27 RX ADMIN — BUDESONIDE AND FORMOTEROL FUMARATE DIHYDRATE 2 PUFF: 160; 4.5 AEROSOL RESPIRATORY (INHALATION) at 20:20

## 2021-09-27 RX ADMIN — GUAIFENESIN 1200 MG: 600 TABLET, EXTENDED RELEASE ORAL at 21:46

## 2021-09-27 RX ADMIN — FAMOTIDINE 20 MG: 20 TABLET, FILM COATED ORAL at 21:46

## 2021-09-27 RX ADMIN — ENOXAPARIN SODIUM 40 MG: 40 INJECTION SUBCUTANEOUS at 21:46

## 2021-09-27 RX ADMIN — BUDESONIDE AND FORMOTEROL FUMARATE DIHYDRATE 2 PUFF: 160; 4.5 AEROSOL RESPIRATORY (INHALATION) at 07:14

## 2021-09-27 RX ADMIN — HYDROCODONE BITARTRATE AND ACETAMINOPHEN 1 TABLET: 7.5; 325 TABLET ORAL at 01:04

## 2021-09-27 RX ADMIN — ALBUTEROL SULFATE 2 PUFF: 108 AEROSOL, METERED RESPIRATORY (INHALATION) at 20:20

## 2021-09-27 RX ADMIN — ALBUTEROL SULFATE 2 PUFF: 108 AEROSOL, METERED RESPIRATORY (INHALATION) at 11:15

## 2021-09-27 RX ADMIN — VANCOMYCIN HYDROCHLORIDE 1250 MG: 10 INJECTION, POWDER, LYOPHILIZED, FOR SOLUTION INTRAVENOUS at 14:25

## 2021-09-27 RX ADMIN — VANCOMYCIN HYDROCHLORIDE 1250 MG: 10 INJECTION, POWDER, LYOPHILIZED, FOR SOLUTION INTRAVENOUS at 06:57

## 2021-09-27 RX ADMIN — GUAIFENESIN 1200 MG: 600 TABLET, EXTENDED RELEASE ORAL at 09:16

## 2021-09-27 RX ADMIN — ALBUTEROL SULFATE 2 PUFF: 108 AEROSOL, METERED RESPIRATORY (INHALATION) at 07:14

## 2021-09-27 NOTE — ANESTHESIA POSTPROCEDURE EVALUATION
"Patient: Lamar Rodriguez    Procedure Summary     Date: 09/24/21 Room / Location:  [facility] OR 08 /  [facility] OR    Anesthesia Start: 1648 Anesthesia Stop: 1727    Procedure: BRONCHOSCOPY (N/A Bronchus) Diagnosis:       Pneumonia of right lower lobe due to infectious organism      (Pneumonia of right lower lobe due to infectious organism [J18.9])    Surgeons: Jarret Isidro MD Provider: Frederic Luo CRNA    Anesthesia Type: general ASA Status: 3 - Emergent          Anesthesia Type: general    Vitals  Vitals Value Taken Time   /57 09/24/21 1758   Temp 98.1 °F (36.7 °C) 09/24/21 1758   Pulse 72 09/24/21 1812   Resp 15 09/24/21 1803   SpO2 95 % 09/24/21 1812   Vitals shown include unvalidated device data.        Post Anesthesia Care and Evaluation    Patient location during evaluation: PACU  Patient participation: complete - patient participated  Level of consciousness: awake and alert  Pain management: adequate  Airway patency: patent  Anesthetic complications: No anesthetic complications    Cardiovascular status: acceptable  Respiratory status: acceptable  Hydration status: acceptable    Comments: Blood pressure 98/56, pulse 109, temperature 99.7 °F (37.6 °C), temperature source Oral, resp. rate 25, height 177.8 cm (70\"), weight 65.1 kg (143 lb 8.3 oz), SpO2 95 %, not currently breastfeeding.    Pt discharged from PACU based on angel score >8      "

## 2021-09-28 ENCOUNTER — APPOINTMENT (OUTPATIENT)
Dept: GENERAL RADIOLOGY | Facility: HOSPITAL | Age: 21
End: 2021-09-28

## 2021-09-28 ENCOUNTER — APPOINTMENT (OUTPATIENT)
Dept: CT IMAGING | Facility: HOSPITAL | Age: 21
End: 2021-09-28

## 2021-09-28 LAB
ANION GAP SERPL CALCULATED.3IONS-SCNC: 8 MMOL/L (ref 5–15)
BUN SERPL-MCNC: 9 MG/DL (ref 6–20)
BUN/CREAT SERPL: 30 (ref 7–25)
CALCIUM SPEC-SCNC: 8.2 MG/DL (ref 8.6–10.5)
CHLORIDE SERPL-SCNC: 102 MMOL/L (ref 98–107)
CHOLEST SERPL-MCNC: 109 MG/DL (ref 0–200)
CO2 SERPL-SCNC: 26 MMOL/L (ref 22–29)
CREAT SERPL-MCNC: 0.3 MG/DL (ref 0.57–1)
CRP SERPL-MCNC: 10.15 MG/DL (ref 0–0.5)
DEPRECATED RDW RBC AUTO: 43.9 FL (ref 37–54)
ERYTHROCYTE [DISTWIDTH] IN BLOOD BY AUTOMATED COUNT: 15.1 % (ref 12.3–15.4)
GFR SERPL CREATININE-BSD FRML MDRD: >150 ML/MIN/1.73
GLUCOSE SERPL-MCNC: 103 MG/DL (ref 65–99)
HBA1C MFR BLD: 6 % (ref 4.8–5.6)
HCT VFR BLD AUTO: 31.6 % (ref 34–46.6)
HDLC SERPL-MCNC: 28 MG/DL (ref 40–60)
HGB BLD-MCNC: 10.5 G/DL (ref 12–15.9)
LDLC SERPL CALC-MCNC: 69 MG/DL (ref 0–100)
LDLC/HDLC SERPL: 2.53 {RATIO}
MCH RBC QN AUTO: 27.1 PG (ref 26.6–33)
MCHC RBC AUTO-ENTMCNC: 33.2 G/DL (ref 31.5–35.7)
MCV RBC AUTO: 81.7 FL (ref 79–97)
PLATELET # BLD AUTO: 425 10*3/MM3 (ref 140–450)
PMV BLD AUTO: 10 FL (ref 6–12)
POTASSIUM SERPL-SCNC: 3.9 MMOL/L (ref 3.5–5.2)
RBC # BLD AUTO: 3.87 10*6/MM3 (ref 3.77–5.28)
SARS-COV-2 RNA PNL SPEC NAA+PROBE: NOT DETECTED
SODIUM SERPL-SCNC: 136 MMOL/L (ref 136–145)
TRIGL SERPL-MCNC: 51 MG/DL (ref 0–150)
TSH SERPL DL<=0.05 MIU/L-ACNC: 3.54 UIU/ML (ref 0.27–4.2)
VLDLC SERPL-MCNC: 12 MG/DL (ref 5–40)
WBC # BLD AUTO: 10.74 10*3/MM3 (ref 3.4–10.8)

## 2021-09-28 PROCEDURE — 94640 AIRWAY INHALATION TREATMENT: CPT

## 2021-09-28 PROCEDURE — 99231 SBSQ HOSP IP/OBS SF/LOW 25: CPT | Performed by: THORACIC SURGERY (CARDIOTHORACIC VASCULAR SURGERY)

## 2021-09-28 PROCEDURE — 94799 UNLISTED PULMONARY SVC/PX: CPT

## 2021-09-28 PROCEDURE — 71045 X-RAY EXAM CHEST 1 VIEW: CPT

## 2021-09-28 PROCEDURE — C1751 CATH, INF, PER/CENT/MIDLINE: HCPCS

## 2021-09-28 PROCEDURE — 05HB33Z INSERTION OF INFUSION DEVICE INTO RIGHT BASILIC VEIN, PERCUTANEOUS APPROACH: ICD-10-PCS | Performed by: INTERNAL MEDICINE

## 2021-09-28 PROCEDURE — 87635 SARS-COV-2 COVID-19 AMP PRB: CPT | Performed by: THORACIC SURGERY (CARDIOTHORACIC VASCULAR SURGERY)

## 2021-09-28 PROCEDURE — 99233 SBSQ HOSP IP/OBS HIGH 50: CPT | Performed by: INTERNAL MEDICINE

## 2021-09-28 PROCEDURE — 25010000002 VANCOMYCIN 10 G RECONSTITUTED SOLUTION: Performed by: INTERNAL MEDICINE

## 2021-09-28 PROCEDURE — 25010000002 IOPAMIDOL 61 % SOLUTION: Performed by: FAMILY MEDICINE

## 2021-09-28 PROCEDURE — 36410 VNPNXR 3YR/> PHY/QHP DX/THER: CPT

## 2021-09-28 PROCEDURE — 71260 CT THORAX DX C+: CPT

## 2021-09-28 RX ORDER — IPRATROPIUM BROMIDE AND ALBUTEROL SULFATE 2.5; .5 MG/3ML; MG/3ML
3 SOLUTION RESPIRATORY (INHALATION)
Status: DISCONTINUED | OUTPATIENT
Start: 2021-09-28 | End: 2021-10-06 | Stop reason: HOSPADM

## 2021-09-28 RX ORDER — LIDOCAINE HYDROCHLORIDE 10 MG/ML
1 INJECTION, SOLUTION EPIDURAL; INFILTRATION; INTRACAUDAL; PERINEURAL ONCE
Status: COMPLETED | OUTPATIENT
Start: 2021-09-28 | End: 2021-09-28

## 2021-09-28 RX ADMIN — BUDESONIDE AND FORMOTEROL FUMARATE DIHYDRATE 2 PUFF: 160; 4.5 AEROSOL RESPIRATORY (INHALATION) at 06:41

## 2021-09-28 RX ADMIN — LIDOCAINE HYDROCHLORIDE ANHYDROUS 1 ML: 10 INJECTION, SOLUTION INFILTRATION at 16:31

## 2021-09-28 RX ADMIN — HYDROCODONE BITARTRATE AND ACETAMINOPHEN 1 TABLET: 7.5; 325 TABLET ORAL at 06:14

## 2021-09-28 RX ADMIN — IPRATROPIUM BROMIDE AND ALBUTEROL SULFATE 3 ML: 2.5; .5 SOLUTION RESPIRATORY (INHALATION) at 17:58

## 2021-09-28 RX ADMIN — BUDESONIDE AND FORMOTEROL FUMARATE DIHYDRATE 2 PUFF: 160; 4.5 AEROSOL RESPIRATORY (INHALATION) at 18:07

## 2021-09-28 RX ADMIN — FAMOTIDINE 20 MG: 20 TABLET, FILM COATED ORAL at 10:06

## 2021-09-28 RX ADMIN — GUAIFENESIN 1200 MG: 600 TABLET, EXTENDED RELEASE ORAL at 22:05

## 2021-09-28 RX ADMIN — IPRATROPIUM BROMIDE AND ALBUTEROL SULFATE 3 ML: 2.5; .5 SOLUTION RESPIRATORY (INHALATION) at 10:21

## 2021-09-28 RX ADMIN — IOPAMIDOL 100 ML: 612 INJECTION, SOLUTION INTRAVENOUS at 07:45

## 2021-09-28 RX ADMIN — DOCUSATE SODIUM 100 MG: 100 CAPSULE ORAL at 10:07

## 2021-09-28 RX ADMIN — VANCOMYCIN HYDROCHLORIDE 1250 MG: 10 INJECTION, POWDER, LYOPHILIZED, FOR SOLUTION INTRAVENOUS at 22:06

## 2021-09-28 RX ADMIN — FAMOTIDINE 20 MG: 20 TABLET, FILM COATED ORAL at 22:05

## 2021-09-28 RX ADMIN — HYDROCODONE BITARTRATE AND ACETAMINOPHEN 1 TABLET: 7.5; 325 TABLET ORAL at 21:10

## 2021-09-28 RX ADMIN — ALBUTEROL SULFATE 2 PUFF: 108 AEROSOL, METERED RESPIRATORY (INHALATION) at 06:41

## 2021-09-28 RX ADMIN — VANCOMYCIN HYDROCHLORIDE 1250 MG: 10 INJECTION, POWDER, LYOPHILIZED, FOR SOLUTION INTRAVENOUS at 05:55

## 2021-09-28 RX ADMIN — GUAIFENESIN 1200 MG: 600 TABLET, EXTENDED RELEASE ORAL at 10:06

## 2021-09-28 RX ADMIN — IPRATROPIUM BROMIDE AND ALBUTEROL SULFATE 3 ML: 2.5; .5 SOLUTION RESPIRATORY (INHALATION) at 13:58

## 2021-09-28 RX ADMIN — VANCOMYCIN HYDROCHLORIDE 1250 MG: 10 INJECTION, POWDER, LYOPHILIZED, FOR SOLUTION INTRAVENOUS at 14:50

## 2021-09-29 ENCOUNTER — APPOINTMENT (OUTPATIENT)
Dept: GENERAL RADIOLOGY | Facility: HOSPITAL | Age: 21
End: 2021-09-29

## 2021-09-29 ENCOUNTER — ANESTHESIA (OUTPATIENT)
Dept: PERIOP | Facility: HOSPITAL | Age: 21
End: 2021-09-29

## 2021-09-29 ENCOUNTER — ANESTHESIA EVENT (OUTPATIENT)
Dept: PERIOP | Facility: HOSPITAL | Age: 21
End: 2021-09-29

## 2021-09-29 LAB
ANION GAP SERPL CALCULATED.3IONS-SCNC: 6 MMOL/L (ref 5–15)
B-HCG UR QL: NEGATIVE
BUN SERPL-MCNC: 8 MG/DL (ref 6–20)
BUN/CREAT SERPL: 25 (ref 7–25)
CALCIUM SPEC-SCNC: 8.6 MG/DL (ref 8.6–10.5)
CHLORIDE SERPL-SCNC: 101 MMOL/L (ref 98–107)
CO2 SERPL-SCNC: 28 MMOL/L (ref 22–29)
CREAT SERPL-MCNC: 0.32 MG/DL (ref 0.57–1)
DEPRECATED RDW RBC AUTO: 47.6 FL (ref 37–54)
ERYTHROCYTE [DISTWIDTH] IN BLOOD BY AUTOMATED COUNT: 15.4 % (ref 12.3–15.4)
GFR SERPL CREATININE-BSD FRML MDRD: >150 ML/MIN/1.73
GLUCOSE SERPL-MCNC: 106 MG/DL (ref 65–99)
HCT VFR BLD AUTO: 34.6 % (ref 34–46.6)
HGB BLD-MCNC: 10.7 G/DL (ref 12–15.9)
MCH RBC QN AUTO: 26.5 PG (ref 26.6–33)
MCHC RBC AUTO-ENTMCNC: 30.9 G/DL (ref 31.5–35.7)
MCV RBC AUTO: 85.6 FL (ref 79–97)
PLATELET # BLD AUTO: 368 10*3/MM3 (ref 140–450)
PMV BLD AUTO: 10 FL (ref 6–12)
POTASSIUM SERPL-SCNC: 3.9 MMOL/L (ref 3.5–5.2)
RBC # BLD AUTO: 4.04 10*6/MM3 (ref 3.77–5.28)
SODIUM SERPL-SCNC: 135 MMOL/L (ref 136–145)
VANCOMYCIN TROUGH SERPL-MCNC: 5.7 MCG/ML (ref 5–20)
WBC # BLD AUTO: 7.38 10*3/MM3 (ref 3.4–10.8)

## 2021-09-29 PROCEDURE — 99231 SBSQ HOSP IP/OBS SF/LOW 25: CPT | Performed by: NURSE PRACTITIONER

## 2021-09-29 PROCEDURE — 25010000002 FENTANYL CITRATE (PF) 100 MCG/2ML SOLUTION: Performed by: NURSE ANESTHETIST, CERTIFIED REGISTERED

## 2021-09-29 PROCEDURE — 80048 BASIC METABOLIC PNL TOTAL CA: CPT | Performed by: FAMILY MEDICINE

## 2021-09-29 PROCEDURE — 80202 ASSAY OF VANCOMYCIN: CPT | Performed by: FAMILY MEDICINE

## 2021-09-29 PROCEDURE — 0BC58ZZ EXTIRPATION OF MATTER FROM RIGHT MIDDLE LOBE BRONCHUS, VIA NATURAL OR ARTIFICIAL OPENING ENDOSCOPIC: ICD-10-PCS | Performed by: THORACIC SURGERY (CARDIOTHORACIC VASCULAR SURGERY)

## 2021-09-29 PROCEDURE — 25010000002 MIDAZOLAM PER 1 MG: Performed by: ANESTHESIOLOGY

## 2021-09-29 PROCEDURE — 25010000002 DEXAMETHASONE PER 1 MG: Performed by: ANESTHESIOLOGY

## 2021-09-29 PROCEDURE — 71045 X-RAY EXAM CHEST 1 VIEW: CPT

## 2021-09-29 PROCEDURE — 25010000002 VANCOMYCIN 10 G RECONSTITUTED SOLUTION: Performed by: FAMILY MEDICINE

## 2021-09-29 PROCEDURE — 94799 UNLISTED PULMONARY SVC/PX: CPT

## 2021-09-29 PROCEDURE — 31624 DX BRONCHOSCOPE/LAVAGE: CPT | Performed by: THORACIC SURGERY (CARDIOTHORACIC VASCULAR SURGERY)

## 2021-09-29 PROCEDURE — 81025 URINE PREGNANCY TEST: CPT | Performed by: NURSE PRACTITIONER

## 2021-09-29 PROCEDURE — 25010000002 VANCOMYCIN 10 G RECONSTITUTED SOLUTION: Performed by: INTERNAL MEDICINE

## 2021-09-29 PROCEDURE — 0BC48ZZ EXTIRPATION OF MATTER FROM RIGHT UPPER LOBE BRONCHUS, VIA NATURAL OR ARTIFICIAL OPENING ENDOSCOPIC: ICD-10-PCS | Performed by: THORACIC SURGERY (CARDIOTHORACIC VASCULAR SURGERY)

## 2021-09-29 PROCEDURE — 25010000002 PROPOFOL 10 MG/ML EMULSION: Performed by: NURSE ANESTHETIST, CERTIFIED REGISTERED

## 2021-09-29 PROCEDURE — 85027 COMPLETE CBC AUTOMATED: CPT | Performed by: FAMILY MEDICINE

## 2021-09-29 PROCEDURE — 99233 SBSQ HOSP IP/OBS HIGH 50: CPT | Performed by: INTERNAL MEDICINE

## 2021-09-29 PROCEDURE — 25010000002 ONDANSETRON PER 1 MG: Performed by: NURSE ANESTHETIST, CERTIFIED REGISTERED

## 2021-09-29 PROCEDURE — 0BC68ZZ EXTIRPATION OF MATTER FROM RIGHT LOWER LOBE BRONCHUS, VIA NATURAL OR ARTIFICIAL OPENING ENDOSCOPIC: ICD-10-PCS | Performed by: THORACIC SURGERY (CARDIOTHORACIC VASCULAR SURGERY)

## 2021-09-29 RX ORDER — LIDOCAINE HYDROCHLORIDE 10 MG/ML
0.5 INJECTION, SOLUTION EPIDURAL; INFILTRATION; INTRACAUDAL; PERINEURAL ONCE AS NEEDED
Status: DISCONTINUED | OUTPATIENT
Start: 2021-09-29 | End: 2021-09-29 | Stop reason: HOSPADM

## 2021-09-29 RX ORDER — PROPOFOL 10 MG/ML
VIAL (ML) INTRAVENOUS AS NEEDED
Status: DISCONTINUED | OUTPATIENT
Start: 2021-09-29 | End: 2021-09-29 | Stop reason: SURG

## 2021-09-29 RX ORDER — OXYCODONE AND ACETAMINOPHEN 10; 325 MG/1; MG/1
1 TABLET ORAL ONCE AS NEEDED
Status: DISCONTINUED | OUTPATIENT
Start: 2021-09-29 | End: 2021-09-29 | Stop reason: HOSPADM

## 2021-09-29 RX ORDER — ONDANSETRON 2 MG/ML
4 INJECTION INTRAMUSCULAR; INTRAVENOUS AS NEEDED
Status: DISCONTINUED | OUTPATIENT
Start: 2021-09-29 | End: 2021-09-29 | Stop reason: HOSPADM

## 2021-09-29 RX ORDER — MIDAZOLAM HYDROCHLORIDE 1 MG/ML
1 INJECTION INTRAMUSCULAR; INTRAVENOUS
Status: DISCONTINUED | OUTPATIENT
Start: 2021-09-29 | End: 2021-09-29 | Stop reason: HOSPADM

## 2021-09-29 RX ORDER — SODIUM CHLORIDE 0.9 % (FLUSH) 0.9 %
3 SYRINGE (ML) INJECTION EVERY 12 HOURS SCHEDULED
Status: DISCONTINUED | OUTPATIENT
Start: 2021-09-29 | End: 2021-09-29 | Stop reason: HOSPADM

## 2021-09-29 RX ORDER — FENTANYL CITRATE 50 UG/ML
25 INJECTION, SOLUTION INTRAMUSCULAR; INTRAVENOUS
Status: DISCONTINUED | OUTPATIENT
Start: 2021-09-29 | End: 2021-09-29 | Stop reason: HOSPADM

## 2021-09-29 RX ORDER — FLUMAZENIL 0.1 MG/ML
0.2 INJECTION INTRAVENOUS AS NEEDED
Status: DISCONTINUED | OUTPATIENT
Start: 2021-09-29 | End: 2021-09-29 | Stop reason: HOSPADM

## 2021-09-29 RX ORDER — SODIUM CHLORIDE, SODIUM LACTATE, POTASSIUM CHLORIDE, CALCIUM CHLORIDE 600; 310; 30; 20 MG/100ML; MG/100ML; MG/100ML; MG/100ML
100 INJECTION, SOLUTION INTRAVENOUS CONTINUOUS
Status: DISCONTINUED | OUTPATIENT
Start: 2021-09-29 | End: 2021-09-30

## 2021-09-29 RX ORDER — HYDROMORPHONE HYDROCHLORIDE 1 MG/ML
0.5 INJECTION, SOLUTION INTRAMUSCULAR; INTRAVENOUS; SUBCUTANEOUS
Status: DISCONTINUED | OUTPATIENT
Start: 2021-09-29 | End: 2021-09-29 | Stop reason: HOSPADM

## 2021-09-29 RX ORDER — SODIUM CHLORIDE 0.9 % (FLUSH) 0.9 %
3-10 SYRINGE (ML) INJECTION AS NEEDED
Status: DISCONTINUED | OUTPATIENT
Start: 2021-09-29 | End: 2021-09-29 | Stop reason: HOSPADM

## 2021-09-29 RX ORDER — DEXAMETHASONE SODIUM PHOSPHATE 4 MG/ML
4 INJECTION, SOLUTION INTRA-ARTICULAR; INTRALESIONAL; INTRAMUSCULAR; INTRAVENOUS; SOFT TISSUE ONCE AS NEEDED
Status: COMPLETED | OUTPATIENT
Start: 2021-09-29 | End: 2021-09-29

## 2021-09-29 RX ORDER — NEOSTIGMINE METHYLSULFATE 5 MG/5 ML
SYRINGE (ML) INTRAVENOUS AS NEEDED
Status: DISCONTINUED | OUTPATIENT
Start: 2021-09-29 | End: 2021-09-29 | Stop reason: SURG

## 2021-09-29 RX ORDER — ONDANSETRON 2 MG/ML
INJECTION INTRAMUSCULAR; INTRAVENOUS AS NEEDED
Status: DISCONTINUED | OUTPATIENT
Start: 2021-09-29 | End: 2021-09-29 | Stop reason: SURG

## 2021-09-29 RX ORDER — LABETALOL HYDROCHLORIDE 5 MG/ML
5 INJECTION, SOLUTION INTRAVENOUS
Status: DISCONTINUED | OUTPATIENT
Start: 2021-09-29 | End: 2021-09-29 | Stop reason: HOSPADM

## 2021-09-29 RX ORDER — LIDOCAINE HYDROCHLORIDE 40 MG/ML
SOLUTION TOPICAL AS NEEDED
Status: DISCONTINUED | OUTPATIENT
Start: 2021-09-29 | End: 2021-09-29 | Stop reason: SURG

## 2021-09-29 RX ORDER — NALOXONE HCL 0.4 MG/ML
0.04 VIAL (ML) INJECTION AS NEEDED
Status: DISCONTINUED | OUTPATIENT
Start: 2021-09-29 | End: 2021-09-29 | Stop reason: HOSPADM

## 2021-09-29 RX ORDER — LIDOCAINE HYDROCHLORIDE 20 MG/ML
INJECTION, SOLUTION EPIDURAL; INFILTRATION; INTRACAUDAL; PERINEURAL AS NEEDED
Status: DISCONTINUED | OUTPATIENT
Start: 2021-09-29 | End: 2021-09-29 | Stop reason: SURG

## 2021-09-29 RX ORDER — FENTANYL CITRATE 50 UG/ML
INJECTION, SOLUTION INTRAMUSCULAR; INTRAVENOUS AS NEEDED
Status: DISCONTINUED | OUTPATIENT
Start: 2021-09-29 | End: 2021-09-29 | Stop reason: SURG

## 2021-09-29 RX ADMIN — MIDAZOLAM HYDROCHLORIDE 1 MG: 1 INJECTION, SOLUTION INTRAMUSCULAR; INTRAVENOUS at 09:00

## 2021-09-29 RX ADMIN — DEXAMETHASONE SODIUM PHOSPHATE 4 MG: 4 INJECTION, SOLUTION INTRA-ARTICULAR; INTRALESIONAL; INTRAMUSCULAR; INTRAVENOUS; SOFT TISSUE at 09:00

## 2021-09-29 RX ADMIN — FAMOTIDINE 20 MG: 20 TABLET, FILM COATED ORAL at 14:27

## 2021-09-29 RX ADMIN — IPRATROPIUM BROMIDE AND ALBUTEROL SULFATE 3 ML: 2.5; .5 SOLUTION RESPIRATORY (INHALATION) at 18:07

## 2021-09-29 RX ADMIN — GLYCOPYRROLATE 0.4 MG: 0.2 INJECTION, SOLUTION INTRAMUSCULAR; INTRAVENOUS at 10:16

## 2021-09-29 RX ADMIN — Medication 3 MG: at 10:16

## 2021-09-29 RX ADMIN — FAMOTIDINE 20 MG: 20 TABLET, FILM COATED ORAL at 21:11

## 2021-09-29 RX ADMIN — IPRATROPIUM BROMIDE AND ALBUTEROL SULFATE 3 ML: 2.5; .5 SOLUTION RESPIRATORY (INHALATION) at 14:18

## 2021-09-29 RX ADMIN — LIDOCAINE HYDROCHLORIDE 1 EACH: 40 SOLUTION TOPICAL at 09:50

## 2021-09-29 RX ADMIN — SODIUM CHLORIDE, PRESERVATIVE FREE 10 ML: 5 INJECTION INTRAVENOUS at 14:28

## 2021-09-29 RX ADMIN — SODIUM CHLORIDE, POTASSIUM CHLORIDE, SODIUM LACTATE AND CALCIUM CHLORIDE 100 ML/HR: 600; 310; 30; 20 INJECTION, SOLUTION INTRAVENOUS at 08:59

## 2021-09-29 RX ADMIN — BUDESONIDE AND FORMOTEROL FUMARATE DIHYDRATE 2 PUFF: 160; 4.5 AEROSOL RESPIRATORY (INHALATION) at 18:15

## 2021-09-29 RX ADMIN — GUAIFENESIN 1200 MG: 600 TABLET, EXTENDED RELEASE ORAL at 21:11

## 2021-09-29 RX ADMIN — VANCOMYCIN HYDROCHLORIDE 1500 MG: 10 INJECTION, POWDER, LYOPHILIZED, FOR SOLUTION INTRAVENOUS at 18:30

## 2021-09-29 RX ADMIN — LIDOCAINE HYDROCHLORIDE 100 MG: 20 INJECTION, SOLUTION EPIDURAL; INFILTRATION; INTRACAUDAL; PERINEURAL at 09:49

## 2021-09-29 RX ADMIN — PROPOFOL 150 MG: 10 INJECTION, EMULSION INTRAVENOUS at 09:49

## 2021-09-29 RX ADMIN — ONDANSETRON 4 MG: 2 INJECTION INTRAMUSCULAR; INTRAVENOUS at 10:16

## 2021-09-29 RX ADMIN — FENTANYL CITRATE 100 MCG: 50 INJECTION, SOLUTION INTRAMUSCULAR; INTRAVENOUS at 09:49

## 2021-09-29 RX ADMIN — VANCOMYCIN HYDROCHLORIDE 1250 MG: 10 INJECTION, POWDER, LYOPHILIZED, FOR SOLUTION INTRAVENOUS at 06:18

## 2021-09-29 NOTE — ANESTHESIA PREPROCEDURE EVALUATION
Anesthesia Evaluation     Patient summary reviewed   no history of anesthetic complications:  NPO Solid Status: > 8 hours  NPO Liquid Status: > 8 hours           Airway   Mallampati: I  TM distance: >3 FB  Neck ROM: full  No difficulty expected  Dental      Pulmonary    (+) pneumonia , pleural effusion, recent URI,     ROS comment: COVID positive 09/12/21    Mucous plugging    Right sided pneumothorax s/p chest tube  Cardiovascular     (-) hypertension, past MI, CAD, dysrhythmias, cardiac stents, hyperlipidemia    ROS comment: Echo:  · Left ventricular wall thickness is consistent with borderline concentric hypertrophy.  · Estimated left ventricular EF = 55% Left ventricular systolic function is normal.  · Left ventricular diastolic function was normal.    Neuro/Psych  (-) seizures, TIA, CVA  GI/Hepatic/Renal/Endo    (-) liver disease, no renal disease, diabetes    Musculoskeletal     Abdominal    Substance History      OB/GYN          Other                        Anesthesia Plan    ASA 3     general     intravenous induction     Anesthetic plan, all risks, benefits, and alternatives have been provided, discussed and informed consent has been obtained with: patient.

## 2021-09-29 NOTE — ANESTHESIA POSTPROCEDURE EVALUATION
"Patient: Lamar Rodriguez    Procedure Summary     Date: 09/29/21 Room / Location:  [facility] OR 06 /  [facility] OR    Anesthesia Start: 0943 Anesthesia Stop: 1023    Procedure: THERAPEUTIC BRONCHOSCOPY WITH WASHOUT (N/A Bronchus) Diagnosis:       Mucus plugging of bronchi      (Mucus plugging of bronchi [J98.09])    Surgeons: Jarret Isidro MD Provider: Alfredo Aaron CRNA    Anesthesia Type: general ASA Status: 3          Anesthesia Type: general    Vitals  Vitals Value Taken Time   BP 94/47 09/29/21 1110   Temp 98.8 °F (37.1 °C) 09/29/21 1110   Pulse 67 09/29/21 1112   Resp 20 09/29/21 1110   SpO2 95 % 09/29/21 1112   Vitals shown include unvalidated device data.        Post Anesthesia Care and Evaluation    PONV Status: none  Comments: Patient d/c from PACU prior to anes eval based on Sammi score.  Please see RN notes for details of d/c criteria.    Blood pressure 94/56, pulse 72, temperature 98.6 °F (37 °C), temperature source Oral, resp. rate 18, height 177.8 cm (70\"), weight 65.1 kg (143 lb 8.3 oz), last menstrual period 09/22/2021, SpO2 93 %, not currently breastfeeding.          "

## 2021-09-29 NOTE — ANESTHESIA PROCEDURE NOTES
Airway  Urgency: elective    Airway not difficult    General Information and Staff    Patient location during procedure: OR  CRNA: Alfredo Aaron CRNA    Indications and Patient Condition  Indications for airway management: airway protection    Preoxygenated: yes  Mask difficulty assessment: 1 - vent by mask    Final Airway Details  Final airway type: endotracheal airway      Successful airway: ETT  Cuffed: yes   Successful intubation technique: direct laryngoscopy  Endotracheal tube insertion site: oral  Blade: Card[place]  Blade size: 2  ETT size (mm): 7.5  Cormack-Lehane Classification: grade I - full view of glottis  Placement verified by: chest auscultation and capnometry   Cuff volume (mL): 8  Measured from: teeth  ETT/EBT  to teeth (cm): 22  Number of attempts at approach: 1  Assessment: lips, teeth, and gum same as pre-op and atraumatic intubation

## 2021-09-30 ENCOUNTER — APPOINTMENT (OUTPATIENT)
Dept: GENERAL RADIOLOGY | Facility: HOSPITAL | Age: 21
End: 2021-09-30

## 2021-09-30 LAB
ANION GAP SERPL CALCULATED.3IONS-SCNC: 8 MMOL/L (ref 5–15)
BUN SERPL-MCNC: 10 MG/DL (ref 6–20)
BUN/CREAT SERPL: 30.3 (ref 7–25)
CALCIUM SPEC-SCNC: 8.6 MG/DL (ref 8.6–10.5)
CHLORIDE SERPL-SCNC: 105 MMOL/L (ref 98–107)
CO2 SERPL-SCNC: 29 MMOL/L (ref 22–29)
CREAT SERPL-MCNC: 0.33 MG/DL (ref 0.57–1)
DEPRECATED RDW RBC AUTO: 48.2 FL (ref 37–54)
ERYTHROCYTE [DISTWIDTH] IN BLOOD BY AUTOMATED COUNT: 15.5 % (ref 12.3–15.4)
GFR SERPL CREATININE-BSD FRML MDRD: >150 ML/MIN/1.73
GLUCOSE SERPL-MCNC: 104 MG/DL (ref 65–99)
HCT VFR BLD AUTO: 31.4 % (ref 34–46.6)
HGB BLD-MCNC: 9.5 G/DL (ref 12–15.9)
MCH RBC QN AUTO: 26 PG (ref 26.6–33)
MCHC RBC AUTO-ENTMCNC: 30.3 G/DL (ref 31.5–35.7)
MCV RBC AUTO: 85.8 FL (ref 79–97)
PLATELET # BLD AUTO: 373 10*3/MM3 (ref 140–450)
PMV BLD AUTO: 9.9 FL (ref 6–12)
POTASSIUM SERPL-SCNC: 3.8 MMOL/L (ref 3.5–5.2)
RBC # BLD AUTO: 3.66 10*6/MM3 (ref 3.77–5.28)
SODIUM SERPL-SCNC: 142 MMOL/L (ref 136–145)
WBC # BLD AUTO: 7.86 10*3/MM3 (ref 3.4–10.8)

## 2021-09-30 PROCEDURE — 94799 UNLISTED PULMONARY SVC/PX: CPT

## 2021-09-30 PROCEDURE — 25010000002 VANCOMYCIN 10 G RECONSTITUTED SOLUTION: Performed by: FAMILY MEDICINE

## 2021-09-30 PROCEDURE — 71045 X-RAY EXAM CHEST 1 VIEW: CPT

## 2021-09-30 PROCEDURE — 94669 MECHANICAL CHEST WALL OSCILL: CPT

## 2021-09-30 PROCEDURE — 99231 SBSQ HOSP IP/OBS SF/LOW 25: CPT | Performed by: THORACIC SURGERY (CARDIOTHORACIC VASCULAR SURGERY)

## 2021-09-30 PROCEDURE — 99233 SBSQ HOSP IP/OBS HIGH 50: CPT | Performed by: INTERNAL MEDICINE

## 2021-09-30 PROCEDURE — 85027 COMPLETE CBC AUTOMATED: CPT | Performed by: NURSE PRACTITIONER

## 2021-09-30 PROCEDURE — 80048 BASIC METABOLIC PNL TOTAL CA: CPT | Performed by: NURSE PRACTITIONER

## 2021-09-30 RX ADMIN — IPRATROPIUM BROMIDE AND ALBUTEROL SULFATE 3 ML: 2.5; .5 SOLUTION RESPIRATORY (INHALATION) at 10:38

## 2021-09-30 RX ADMIN — FAMOTIDINE 20 MG: 20 TABLET, FILM COATED ORAL at 20:51

## 2021-09-30 RX ADMIN — VANCOMYCIN HYDROCHLORIDE 1500 MG: 10 INJECTION, POWDER, LYOPHILIZED, FOR SOLUTION INTRAVENOUS at 10:35

## 2021-09-30 RX ADMIN — VANCOMYCIN HYDROCHLORIDE 1500 MG: 10 INJECTION, POWDER, LYOPHILIZED, FOR SOLUTION INTRAVENOUS at 17:54

## 2021-09-30 RX ADMIN — IPRATROPIUM BROMIDE AND ALBUTEROL SULFATE 3 ML: 2.5; .5 SOLUTION RESPIRATORY (INHALATION) at 06:37

## 2021-09-30 RX ADMIN — IPRATROPIUM BROMIDE AND ALBUTEROL SULFATE 3 ML: 2.5; .5 SOLUTION RESPIRATORY (INHALATION) at 14:21

## 2021-09-30 RX ADMIN — IPRATROPIUM BROMIDE AND ALBUTEROL SULFATE 3 ML: 2.5; .5 SOLUTION RESPIRATORY (INHALATION) at 19:58

## 2021-09-30 RX ADMIN — GUAIFENESIN 1200 MG: 600 TABLET, EXTENDED RELEASE ORAL at 10:29

## 2021-09-30 RX ADMIN — GUAIFENESIN 1200 MG: 600 TABLET, EXTENDED RELEASE ORAL at 20:51

## 2021-09-30 RX ADMIN — VANCOMYCIN HYDROCHLORIDE 1500 MG: 10 INJECTION, POWDER, LYOPHILIZED, FOR SOLUTION INTRAVENOUS at 01:54

## 2021-09-30 RX ADMIN — ACETAMINOPHEN 650 MG: 325 TABLET, FILM COATED ORAL at 20:10

## 2021-09-30 RX ADMIN — FAMOTIDINE 20 MG: 20 TABLET, FILM COATED ORAL at 10:30

## 2021-09-30 RX ADMIN — BUDESONIDE AND FORMOTEROL FUMARATE DIHYDRATE 2 PUFF: 160; 4.5 AEROSOL RESPIRATORY (INHALATION) at 06:38

## 2021-09-30 RX ADMIN — BUDESONIDE AND FORMOTEROL FUMARATE DIHYDRATE 2 PUFF: 160; 4.5 AEROSOL RESPIRATORY (INHALATION) at 19:58

## 2021-09-30 RX ADMIN — DOCUSATE SODIUM 100 MG: 100 CAPSULE ORAL at 10:30

## 2021-10-01 ENCOUNTER — APPOINTMENT (OUTPATIENT)
Dept: GENERAL RADIOLOGY | Facility: HOSPITAL | Age: 21
End: 2021-10-01

## 2021-10-01 LAB
ANION GAP SERPL CALCULATED.3IONS-SCNC: 8 MMOL/L (ref 5–15)
BUN SERPL-MCNC: 8 MG/DL (ref 6–20)
BUN/CREAT SERPL: 23.5 (ref 7–25)
CALCIUM SPEC-SCNC: 8.5 MG/DL (ref 8.6–10.5)
CHLORIDE SERPL-SCNC: 104 MMOL/L (ref 98–107)
CO2 SERPL-SCNC: 26 MMOL/L (ref 22–29)
CREAT SERPL-MCNC: 0.34 MG/DL (ref 0.57–1)
DEPRECATED RDW RBC AUTO: 48.8 FL (ref 37–54)
ERYTHROCYTE [DISTWIDTH] IN BLOOD BY AUTOMATED COUNT: 15.7 % (ref 12.3–15.4)
GFR SERPL CREATININE-BSD FRML MDRD: >150 ML/MIN/1.73
GLUCOSE SERPL-MCNC: 103 MG/DL (ref 65–99)
HCT VFR BLD AUTO: 29.9 % (ref 34–46.6)
HGB BLD-MCNC: 9.4 G/DL (ref 12–15.9)
MCH RBC QN AUTO: 27 PG (ref 26.6–33)
MCHC RBC AUTO-ENTMCNC: 31.4 G/DL (ref 31.5–35.7)
MCV RBC AUTO: 85.9 FL (ref 79–97)
PLATELET # BLD AUTO: 309 10*3/MM3 (ref 140–450)
PMV BLD AUTO: 9.7 FL (ref 6–12)
POTASSIUM SERPL-SCNC: 3.8 MMOL/L (ref 3.5–5.2)
RBC # BLD AUTO: 3.48 10*6/MM3 (ref 3.77–5.28)
SODIUM SERPL-SCNC: 138 MMOL/L (ref 136–145)
WBC # BLD AUTO: 6.92 10*3/MM3 (ref 3.4–10.8)

## 2021-10-01 PROCEDURE — 85027 COMPLETE CBC AUTOMATED: CPT | Performed by: NURSE PRACTITIONER

## 2021-10-01 PROCEDURE — 94669 MECHANICAL CHEST WALL OSCILL: CPT

## 2021-10-01 PROCEDURE — 71045 X-RAY EXAM CHEST 1 VIEW: CPT

## 2021-10-01 PROCEDURE — 25010000002 VANCOMYCIN 10 G RECONSTITUTED SOLUTION: Performed by: INTERNAL MEDICINE

## 2021-10-01 PROCEDURE — 94799 UNLISTED PULMONARY SVC/PX: CPT

## 2021-10-01 PROCEDURE — 80048 BASIC METABOLIC PNL TOTAL CA: CPT | Performed by: NURSE PRACTITIONER

## 2021-10-01 PROCEDURE — 99231 SBSQ HOSP IP/OBS SF/LOW 25: CPT | Performed by: THORACIC SURGERY (CARDIOTHORACIC VASCULAR SURGERY)

## 2021-10-01 PROCEDURE — 25010000002 VANCOMYCIN 10 G RECONSTITUTED SOLUTION: Performed by: FAMILY MEDICINE

## 2021-10-01 RX ADMIN — VANCOMYCIN HYDROCHLORIDE 1500 MG: 10 INJECTION, POWDER, LYOPHILIZED, FOR SOLUTION INTRAVENOUS at 09:37

## 2021-10-01 RX ADMIN — ACETAMINOPHEN 650 MG: 325 TABLET, FILM COATED ORAL at 05:58

## 2021-10-01 RX ADMIN — VANCOMYCIN HYDROCHLORIDE 1500 MG: 10 INJECTION, POWDER, LYOPHILIZED, FOR SOLUTION INTRAVENOUS at 01:23

## 2021-10-01 RX ADMIN — IPRATROPIUM BROMIDE AND ALBUTEROL SULFATE 3 ML: 2.5; .5 SOLUTION RESPIRATORY (INHALATION) at 07:27

## 2021-10-01 RX ADMIN — GUAIFENESIN 1200 MG: 600 TABLET, EXTENDED RELEASE ORAL at 09:38

## 2021-10-01 RX ADMIN — FAMOTIDINE 20 MG: 20 TABLET, FILM COATED ORAL at 09:37

## 2021-10-01 RX ADMIN — GUAIFENESIN 1200 MG: 600 TABLET, EXTENDED RELEASE ORAL at 20:52

## 2021-10-01 RX ADMIN — BUDESONIDE AND FORMOTEROL FUMARATE DIHYDRATE 2 PUFF: 160; 4.5 AEROSOL RESPIRATORY (INHALATION) at 18:30

## 2021-10-01 RX ADMIN — IPRATROPIUM BROMIDE AND ALBUTEROL SULFATE 3 ML: 2.5; .5 SOLUTION RESPIRATORY (INHALATION) at 11:07

## 2021-10-01 RX ADMIN — DOCUSATE SODIUM 100 MG: 100 CAPSULE ORAL at 09:38

## 2021-10-01 RX ADMIN — BUDESONIDE AND FORMOTEROL FUMARATE DIHYDRATE 2 PUFF: 160; 4.5 AEROSOL RESPIRATORY (INHALATION) at 07:26

## 2021-10-01 RX ADMIN — IPRATROPIUM BROMIDE AND ALBUTEROL SULFATE 3 ML: 2.5; .5 SOLUTION RESPIRATORY (INHALATION) at 14:07

## 2021-10-01 RX ADMIN — IPRATROPIUM BROMIDE AND ALBUTEROL SULFATE 3 ML: 2.5; .5 SOLUTION RESPIRATORY (INHALATION) at 18:30

## 2021-10-01 RX ADMIN — VANCOMYCIN HYDROCHLORIDE 1500 MG: 10 INJECTION, POWDER, LYOPHILIZED, FOR SOLUTION INTRAVENOUS at 17:26

## 2021-10-01 RX ADMIN — FAMOTIDINE 20 MG: 20 TABLET, FILM COATED ORAL at 20:52

## 2021-10-01 NOTE — CASE MANAGEMENT/SOCIAL WORK
Continued Stay Note  JOSE Trejo     Patient Name: Lamar Rodriguez  MRN: [id number]  Today's Date: 10/1/2021    Admit Date: 9/21/2021    Discharge Plan     Row Name 10/01/21 1126       Plan    Plan  Home    Plan Comments  Pt still has a chest tube. She will go home at d/c but will follow in case pt will need home health or anything else at d/c.        Discharge Codes    No documentation.             RICK Paulino

## 2021-10-01 NOTE — PLAN OF CARE
Goal Outcome Evaluation:  Plan of Care Reviewed With: patient        Progress: no change  Outcome Summary: CT to waterseal drainage, continues. Has started to produce sputum, clear/yellow-tinged. No further complaints, really wanting chest tube to be discontinued.

## 2021-10-01 NOTE — PROGRESS NOTES
"Infectious Diseases Progress Note    Patient:  Lamar Rodriguez  YOB: 2000  MRN: [id number]   Admit date: 9/21/2021   Admitting Physician: Tim Neil MD  Primary Care Physician: Provider, No Known    Chief Complaint/Interval History: She is the chest physical therapy vest earlier today.  She is without fever.  No pain at IV site.  She did walk some today.      Intake/Output Summary (Last 24 hours) at 10/1/2021 1442  Last data filed at 10/1/2021 1138  Gross per 24 hour   Intake --   Output 1600 ml   Net -1600 ml     Allergies: No Known Allergies  Current Scheduled Medications:   budesonide-formoterol, 2 puff, Inhalation, BID - RT  docusate sodium, 100 mg, Oral, Daily  famotidine, 20 mg, Oral, BID  guaiFENesin, 1,200 mg, Oral, BID  ipratropium-albuterol, 3 mL, Nebulization, 4x Daily - RT  vancomycin, 1,500 mg, Intravenous, Q8H      Current PRN Medications:  •  acetaminophen  •  ondansetron  •  sodium chloride    Review of Systems see HPI    Vital Signs:  BP 90/46 (BP Location: Left arm, Patient Position: Lying)   Pulse 86   Temp 98.7 °F (37.1 °C) (Oral)   Resp 14   Ht 177.8 cm (70\")   Wt 65.1 kg (143 lb 8.3 oz)   LMP 09/22/2021 (Approximate)   SpO2 96%   Breastfeeding No   BMI 20.59 kg/m²     Physical Exam  Vital signs - reviewed.  Line/IV site - No erythema, warmth, induration, or tenderness.  Lungs still with decreased breath sounds right base.  May have had a tiny bit of airflow to the more superior of the right lower lobe area.    Lab Results:  CBC:   Results from last 7 days   Lab Units 10/01/21  0616 09/30/21  0620 09/29/21  0643 09/27/21  2355   WBC 10*3/mm3 6.92 7.86 7.38 10.74   HEMOGLOBIN g/dL 9.4* 9.5* 10.7* 10.5*   HEMATOCRIT % 29.9* 31.4* 34.6 31.6*   PLATELETS 10*3/mm3 309 373 368 425     BMP:  Results from last 7 days   Lab Units 10/01/21  0616 09/30/21  0620 09/30/21  0620 09/29/21  0643 09/29/21  0643 09/27/21  2355 09/27/21  2355   SODIUM mmol/L 138  --  142  --  135*  --  136 "   POTASSIUM mmol/L 3.8  --  3.8  --  3.9  --  3.9   CHLORIDE mmol/L 104  --  105  --  101  --  102   CO2 mmol/L 26.0  --  29.0  --  28.0  --  26.0   BUN mg/dL 8  --  10  --  8  --  9   CREATININE mg/dL 0.34*  --  0.33*  --  0.32*  --  0.30*   GLUCOSE mg/dL 103*   < > 104*   < > 106*   < > 103*   CALCIUM mg/dL 8.5*  --  8.6  --  8.6  --  8.2*    < > = values in this interval not displayed.     Culture Results:   Respiratory Culture   Date Value Ref Range Status   09/24/2021 Test Cancelled  Final     Radiology:  Chest x-ray today personally reviewed:  FINDINGS:   Consolidation in the right lung base is again noted. This is similar to  September 30, 2021. Right chest tube is present. A loculated pleural  complex is present in the right upper chest. This is unchanged cardiac  silhouette is normal. Left lung is radiographically clear..   The osseous structures and surrounding soft tissues demonstrate no acute  abnormality.  IMPRESSION:  1. Stable chest with persistent consolidation in the right lung base.  2. Loculated right pleural complex is also noted..  This report was finalized on 10/01/2021 07:59 by Dr. Joselo Wright MD.        Additional Studies Reviewed: None    Impression:   Right middle and lower lobe pneumonia.  Mucus plugging.  Heavy growth of MRSA on BAL quantitative culture.    Recommendations:   Continue vancomycin today  Will transition to oral linezolid  Encouraging walking, incentive spirometry, and chest physical therapy    Cesar Guevara MD

## 2021-10-01 NOTE — PROGRESS NOTES
Pharmacy Dosing Service  Antimicrobial  Vancomycin    Assessment/Plan:  Current dosage: Vancomycin 1500 mg IVPB every 8 hours  Indication: pneumonia  Start date: 09/21/21  Current end date: 10/04/21    Dose adjusted on 09/29 based on trough level evaluation. Follow-up trough ordered for 10/02/21 prior to 09:00 dose. Pharmacy will continue to follow daily.     Subjective:  Lamar Rodriguez is a 21 y.o. female admitted 9/21/2021  7:13 AM.    (J96.01) Acute respiratory failure with hypoxia (CMS/HCC) - Type II respiratory failure    (J93.9) Pneumothorax on right    (J18.9) Multifocal pneumonia    (U07.1) COVID-19    (J18.9) Pneumonia of right lower lobe due to infectious organism - Plan: Case Request, Case Request, Fungus Culture - Lavage, Lung, Right Lower Lobe, Fungus Culture - Lavage, Lung, Right Lower Lobe, Respiratory Culture - Lavage, Lung, Right Lower Lobe, Respiratory Culture - Lavage, Lung, Right Lower Lobe, BAL Culture, Quantitative - Lavage, Lung, Right Lower Lobe, BAL Culture, Quantitative - Lavage, Lung, Right Lower Lobe    (J98.09) Mucus plugging of bronchi - Plan: Case request, Case request    Anti-Infectives (From admission, onward)      Ordered     Dose/Rate Route Frequency Start Stop    09/29/21 1615  vancomycin 1500 mg/500 mL 0.9% NS IVPB ([facility])     Elan Baltazar, PharmD reviewed the order on 10/01/21 1158.   Ordering Provider: Cesar Norton MD    1,500 mg  over 180 Minutes Intravenous Every 8 Hours 09/29/21 1700 10/04/21 1659    09/21/21 1622  meropenem (MERREM) 1 g in sodium chloride 0.9 % 100 mL IVPB-VTB     Ordering Provider: Chepe Lewis MD    1 g  over 30 Minutes Intravenous Once 09/21/21 1800 09/21/21 1941    09/21/21 1612  vancomycin 1250 mg/250 mL 0.9% NS IVPB ([facility])     Ordering Provider: Chepe Lewis MD    1,250 mg  over 90 Minutes Intravenous Once 09/21/21 1700 09/21/21 1952    09/21/21 0900  piperacillin-tazobactam (ZOSYN) IVPB 4.5 g in 100 mL NS VTB     Ordering  "Provider: Slade Willoughby MD    4.5 g  over 30 Minutes Intravenous Once 09/21/21 0902 09/21/21 1132            Past Medical / Surgical / Social History reviewed.     Objective:  Ht: 177.8 cm (70\"); Wt: 65.1 kg (143 lb 8.3 oz) Body mass index is 20.59 kg/m².  Estimated Creatinine Clearance: >100 mL/min (A) (by C-G formula based on SCr of 0.34 mg/dL (L)).    BUN   Date Value Ref Range Status   10/01/2021 8 6 - 20 mg/dL Final   09/30/2021 10 6 - 20 mg/dL Final   09/29/2021 8 6 - 20 mg/dL Final      Creatinine   Date Value Ref Range Status   10/01/2021 0.34 (L) 0.57 - 1.00 mg/dL Final   09/30/2021 0.33 (L) 0.57 - 1.00 mg/dL Final   09/29/2021 0.32 (L) 0.57 - 1.00 mg/dL Final      Lab Results   Component Value Date    WBC 6.92 10/01/2021    WBC 7.86 09/30/2021    WBC 7.38 09/29/2021      Lab Results   Component Value Date    ALBUMIN 2.60 (L) 09/22/2021       Lab Results   Component Value Date    VANCOTROUGH 5.70 09/29/2021         Culture Results:  Microbiology Results (last 10 days)       Procedure Component Value - Date/Time    COVID PRE-OP / PRE-PROCEDURE SCREENING ORDER (NO ISOLATION) - Swab, Nasal Cavity [[id number]]  (Normal) Collected: 09/28/21 2104    Lab Status: Final result Specimen: Swab from Nasal Cavity Updated: 09/28/21 2200    Narrative:      The following orders were created for panel order COVID PRE-OP / PRE-PROCEDURE SCREENING ORDER (NO ISOLATION) - Swab, Nasal Cavity.  Procedure                               Abnormality         Status                     ---------                               -----------         ------                     COVID-19,[facility]...[[id number]]  Normal              Final result                 Please view results for these tests on the individual orders.    COVID-19,[facility] IN-HOUSE,Nasal Swab No Transport Media 3-4 HR TAT - Swab, Nasal Cavity [[id number]]  (Normal) Collected: 09/28/21 2104    Lab Status: Final result Specimen: Swab from Nasal Cavity Updated: 09/28/21 " 2200     COVID19 Not Detected    Narrative:      Fact sheet for providers: https://www.fda.gov/media/743711/download     Fact sheet for patients: https://www.fda.gov/media/993519/download    Test performed by PCR.    Consider negative results in combination with clinical observations, patient history, and epidemiological information.  Fact sheet for providers: https://www.fda.gov/media/029561/download     Fact sheet for patients: https://www.fda.gov/media/022076/download    Test performed by PCR.    Consider negative results in combination with clinical observations, patient history, and epidemiological information.    Fungus Culture - Lavage, Lung, Right Lower Lobe [[id number]] Collected: 09/24/21 1709    Lab Status: Preliminary result Specimen: Lavage from Lung, Right Lower Lobe Updated: 09/29/21 1801     Fungus Culture No fungus isolated at less than 1 week    Respiratory Culture - Lavage, Lung, Right Lower Lobe [[id number]] Collected: 09/24/21 1709    Lab Status: Final result Specimen: Lavage from Lung, Right Lower Lobe Updated: 09/26/21 1308     Respiratory Culture Test Cancelled     Gram Stain Greater than 25 WBCs per low power field      Rare (1+) Epithelial cells per low power field      Few (2+) Gram positive cocci    Narrative:      Same specimen culture ordered twice.    BAL Culture, Quantitative - Lavage, Lung, Right Lower Lobe [[id number]]  (Abnormal)  (Susceptibility) Collected: 09/24/21 1709    Lab Status: Final result Specimen: Lavage from Lung, Right Lower Lobe Updated: 09/27/21 0952     BAL Culture >100,000 CFU/mL Staphylococcus aureus, MRSA     Comment: Methicillin resistant Staphylococcus aureus, Patient may be an isolation risk.        Gram Stain Greater than 25 WBCs per low power field      No Epithelial cells per low power field      Few (2+) Gram positive cocci    Susceptibility        Staphylococcus aureus, MRSA      DORA      Clindamycin Susceptible      Linezolid Susceptible      Oxacillin  Resistant      Penicillin G Resistant      Tetracycline Susceptible      Trimethoprim + Sulfamethoxazole Susceptible      Vancomycin Susceptible                 Linear View                       Mycoplasma Pneumoniae PCR - Swab, Nasopharynx [[id number]] Collected: 09/22/21 1242    Lab Status: Final result Specimen: Swab from Nasopharynx Updated: 09/27/21 2306     Mycoplasma pneumo by PCR Negative     Comment: No Mycoplasma pneumoniae DNA detected.  This test was developed and its performance characteristics determined  by [facility].  It has not been cleared or approved by the Food and Drug  Administration.  The FDA has determined that such clearance or  approval is not necessary.       Narrative:      Performed at:  [address]  [id number]  : Jeff Barrientos MD, Phone:  [phone]    Legionella Antigen, Urine - Urine, Urine, Clean Catch [[id number]]  (Normal) Collected: 09/21/21 2250    Lab Status: Final result Specimen: Urine, Clean Catch Updated: 09/22/21 0205     LEGIONELLA ANTIGEN, URINE Negative    S. Pneumo Ag Urine or CSF - Urine, Urine, Clean Catch [[id number]]  (Normal) Collected: 09/21/21 2250    Lab Status: Final result Specimen: Urine, Clean Catch Updated: 09/22/21 1035     Strep Pneumo Ag Negative    COVID PRE-OP / PRE-PROCEDURE SCREENING ORDER (NO ISOLATION) - Swab, Nasal Cavity [[id number]]  (Abnormal) Collected: 09/21/21 1532    Lab Status: Final result Specimen: Swab from Nasal Cavity Updated: 09/21/21 1645    Narrative:      The following orders were created for panel order COVID PRE-OP / PRE-PROCEDURE SCREENING ORDER (NO ISOLATION) - Swab, Nasal Cavity.  Procedure                               Abnormality         Status                     ---------                               -----------         ------                     COVID-19,[facility] IN-[facility]...[[id number]]  Abnormal            Final result                 Please view results for these tests  on the individual orders.    COVID-19,[facility] IN-HOUSE,Nasal Swab No Transport Media 3-4 HR TAT - Swab, Nasal Cavity [[id number]]  (Abnormal) Collected: 09/21/21 1532    Lab Status: Final result Specimen: Swab from Nasal Cavity Updated: 09/21/21 1645     COVID19 Detected    Narrative:      Fact sheet for providers: https://www.fda.gov/media/306737/download     Fact sheet for patients: https://www.fda.gov/media/194642/download    Test performed by PCR.    Consider negative results in combination with clinical observations, patient history, and epidemiological information.  Fact sheet for providers: https://www.fda.gov/media/108841/download     Fact sheet for patients: https://www.fda.gov/media/231565/download    Test performed by PCR.    Consider negative results in combination with clinical observations, patient history, and epidemiological information.    MRSA Screen, PCR (Inpatient) - Swab, Nares [[id number]]  (Abnormal) Collected: 09/21/21 1532    Lab Status: Final result Specimen: Swab from Nares Updated: 09/21/21 1748     MRSA PCR MRSA Detected            Elan Baltazar PharmD  10/01/21 12:00 CDT

## 2021-10-01 NOTE — PLAN OF CARE
Goal Outcome Evaluation:           Progress: no change  Outcome Summary: C/o of pain x1. See mar. Patient up ad em. Voiding. CT dressing c/d/i. IV ABX. Safety Maintained.

## 2021-10-01 NOTE — PLAN OF CARE
Goal Outcome Evaluation:         Pt doing well, up ambulating in the room independently. Chest tube remains in place. Pt hoping to go home soon. Denies any pain.

## 2021-10-01 NOTE — PROGRESS NOTES
"Patient name: Lamar Rodriguez  Patient : 2000  VISIT # [id number]  MR #[id number]    Procedure: Right thoracostomy tube placement  Procedure Date: 2021  POD:10  Bronchoscopy #1  POD 7  Bronchoscopy #2  POD 2     Subjective   Chief Complaint   Patient presents with   • Chest Pain     Resting in bed. On room air with O2 sat 94%. Chest physiotherapy was performed overnight. She is mobilizing secretions slightly better. IS 1500 ml. Right chest tube to waterseal. T max 100F overnight.     Telemetry: sinus 80-90s    ROS: no sweats or chills       Objective     Visit Vitals  BP 94/48 (BP Location: Left arm, Patient Position: Lying)   Pulse 83   Temp 99.5 °F (37.5 °C) (Axillary)   Resp 16   Ht 177.8 cm (70\")   Wt 65.1 kg (143 lb 8.3 oz)   LMP 2021 (Approximate)   SpO2 94%   Breastfeeding No   BMI 20.59 kg/m²       Intake/Output Summary (Last 24 hours) at 10/1/2021 1131  Last data filed at 10/1/2021 0600  Gross per 24 hour   Intake 980 ml   Output 1000 ml   Net -20 ml     R CT: No significant output ml/24 hours; no air leak, on waterseal     Lab:     CBC:  Results from last 7 days   Lab Units 10/01/21  0616 21  0620 21  0643   WBC 10*3/mm3 6.92 7.86 7.38   HEMATOCRIT % 29.9* 31.4* 34.6   PLATELETS 10*3/mm3 309 373 368          BMP:  Results from last 7 days   Lab Units 10/01/21  0616 21  0620 21  0643   SODIUM mmol/L 138 142 135*   POTASSIUM mmol/L 3.8 3.8 3.9   CHLORIDE mmol/L 104 105 101   CO2 mmol/L 26.0 29.0 28.0   GLUCOSE mg/dL 103* 104* 106*   BUN mg/dL 8 10 8   CREATININE mg/dL 0.34* 0.33* 0.32*          COAG:        Invalid input(s): PT  Blood Culture   Lab   Staphylococcus hominis ssp hominisCritical   [facility]      Isolated from Aerobic and Anaerobic Bottles                Gram Stain  Critical    Lab   Aerobic Bottle Gram positive cocci in clusters  [facility]      Aerobic Bottle Gram positive cocci in clusters [facility]            Susceptibility     Staphylococcus hominis " ssp hominis     DORA     Oxacillin 2 ug/ml Resistant     Vancomycin <=0.5 ug/ml Susceptible         Culture results from last 30 days   Lab 09/24/21  1709   FUNGUSCX No fungus isolated at less than 1 week      BAL Culture   Lab   >100,000 CFU/mL Staphylococcus aureus, MRSAAbnormal    [facility]   Methicillin resistant Staphylococcus aureus, Patient may be an isolation risk.          Gram Stain   Lab   Greater than 25 WBCs per low power field [facility]      No Epithelial cells per low power field [facility]      Few (2+) Gram positive cocci [facility]                  Specimen Collected: 09/24/21 17:09 Last Resulted: 09/26/21 13:12            IMAGES:       Imaging Results (Last 24 Hours)     Procedure Component Value Units Date/Time    XR Chest 1 View [[id number]] Collected: 10/01/21 0756     Updated: 10/01/21 0802    Narrative:      EXAM: XR CHEST 1 VW- 10/1/2021 5:14 AM CDT     HISTORY: pneumothorax; J96.01-Acute respiratory failure with hypoxia;  J93.9-Pneumothorax, unspecified; J18.9-Pneumonia, unspecified organism;  U07.1-COVID-19; J18.9-Pneumonia, unspecified organism;  T17.500A-Unspecified foreign body in bronchus causing asphyxiation,  initial encounter       COMPARISON: September 30, 2021.     TECHNIQUE: Frontal radiograph of the chest     FINDINGS:   Consolidation in the right lung base is again noted. This is similar to  September 30, 2021. Right chest tube is present. A loculated pleural  complex is present in the right upper chest. This is unchanged cardiac  silhouette is normal. Left lung is radiographically clear..      The osseous structures and surrounding soft tissues demonstrate no acute  abnormality.          Impression:      1. Stable chest with persistent consolidation in the right lung base.        2. Loculated right pleural complex is also noted..        This report was finalized on 10/01/2021 07:59 by Dr. Joselo Wrigth MD.        CT: CT scan of the chest shows lateral superior portion of the  lung probable pneumatocele with air-fluid interface is noted.  She has previously well-known cavitary changes as well.  Her right lower lobe does have some aeration but remains incompletely expanded.  She has once again right lower lobe mucous plugging.  There are biased to the right lung changes.    CXR today: right chest tube in place, right basilar consolidation-mild improvement in aeration      Physical Exam:  General: No acute distress, in good spirits. Resting in bed.   Cardiovascular: RRR, no murmur, rubs, or gallops.    Pulmonary: Clear to auscultation but with right lung fields diminished.   Abdomen: Soft, non distended, and non tender.  Extremities: Warm, moves all extremities.  Neurologic:  No focal deficits, CN II-XII intact grossly.    Chest: Right chest tube to waterseal.   Neurologic:  Grossly intact with no focal deficits.            Impression:  Acute respiratory failure with hypoxia (CMS/HCC), resolved   Pneumothorax, s/p chest tube placement, no air leak on -20 cm H2O suction currently resolved  Pneumonia-MRSA  Right basilar consolidation persists  Recent COVID infection   Cavitary lung nodules, multiple   Pulmonary abscesses   Lactic acidosis, resolved   Bacteremia  Mucus plugging causing right lobar collapse       Plan:  Right chest tube to waterseal  Continue to push pulmonary toilet and ambulation. OK for chest physiotherapy.   Continue supportive care   Antibiotics per ID   DW patient and nursing  May need repeat bronchoscopy; repeat imaging in a few days         Tammi Loomis, APRN  10/01/21  11:31 CDT

## 2021-10-01 NOTE — PLAN OF CARE
Goal Outcome Evaluation:  Plan of Care Reviewed With: patient        Progress: improving  Outcome Summary: Nutrition follow up. Pt is on a regular diet. She reports her appetite is good. She has consumed 75% of the last 3 meals. Advised pt of alternate food selections if needed. She is ordered Boost Plus with lunch and she gets gatorade with all meals. She denies any unintentional weight loss recently. Cont to follow for further needs.

## 2021-10-01 NOTE — PAYOR COMM NOTE
"10/1/21 [facility] [phone]  [phone]    FAXING CLINICAL UPDATE FOR CONT STAY.            Lamar Montiel (21 y.o. Female)     Date of Birth Social Security Number Address Home Phone MRN    2000  [address] [place] [address] [phone] [id number]    Taoism Marital Status          Roman Catholic Single       Admission Date Admission Type Admitting Provider Attending Provider Department, Room/Bed    9/21/21 Emergency Tim Neil MD Truong, Khai C, MD [address], 362/1    Discharge Date Discharge Disposition Discharge Destination                       Attending Provider: Tim Neil MD    Allergies: No Known Allergies    Isolation: None   Infection: MRSA (09/21/21), COVID (History) (09/27/21)   Code Status: CPR    Ht: 177.8 cm (70\")   Wt: 65.1 kg (143 lb 8.3 oz)    Admission Cmt: None   Principal Problem: Pneumonia of right lower lobe due to infectious organism [J18.9] More...                 Active Insurance as of 9/21/2021     Primary Coverage     Payor Plan Insurance Group Employer/Plan Group    AETNA COMMERCIAL [facility] - ASA [id number]     Payor Plan Address Payor Plan Phone Number Payor Plan Fax Number Effective Dates    [address]   1/1/2016 - None Entered    [facility] [id number]       Subscriber Name Subscriber Birth Date Member ID       RENEE MONTIEL 4/18/1972 [id number]                 Emergency Contacts      (Rel.) Home Phone Work Phone Mobile Phone    Renee Montiel (Mother) [phone] -- --        [facility] Encounter Date/Time: 9/21/2021 0713   Hospital Account: [id number]    MRN: [id number]   Patient:  Lamar Montiel   Contact Serial #: [id number]   SSN:          ENCOUNTER             Patient Class: Inpatient   Unit: [address] Service: Medicine     Bed: 362/1   Admitting Provider: Tim Neil MD   Referring Physician:     Attending Provider: Tim Neil MD   Adm Diagnosis: Acute respiratory " failur*               PATIENT          Name: Lamar Montiel : 2000 (21 yrs)   Address: [address] Sex: Female   City: [place] [address]   County: [facility]   Marital Status: SINGLE Ethnicity: NOT                                                                              Race: WHITE   Primary Care Provider: Provider, No Known Patients Phone: Home Phone: [phone]     Mobile Phone: [phone]   EMERGENCY CONTACT   Contact Name Legal Guardian? Relationship to Patient Home Phone Work Phone   1. Renee Montiel  2. *No Contact Specified* No    Mother    [phone]              GUARANTOR            Guarantor: Lamar Montiel     : 2000   Address: [address] Sex: Female     [place] [address]     Relation to Patient: Self       Home Phone: [phone]   Guarantor ID: [id number]       Work Phone:     GUARANTOR EMPLOYER   Employer: [employer]         Status: FULL TIME   COVERAGE          PRIMARY INSURANCE   Payor: AET[employer] COMMERCIAL Plan: [facility] - ASA   Group Number: [id number] Insurance Type: INDEMNITY   Subscriber Name: RENEE MONTIEL Subscriber : 1974   Subscriber ID: [id number] Coverage Address: [id number] [place] [address]   Pat. Rel. to Subscriber: Child Coverage Phone:     SECONDARY INSURANCE   Payor: N/A Plan: N/A   Group Number:   Insurance Type:     Subscriber Name:   Subscriber :     Subscriber ID:   Coverage Address:     Pat. Rel. to Subscriber:   Coverage Phone:        Contact Serial # ([id number])  `       2021    Chart ID [id number])            Encounter Information     Department Encounter #   2021  7:13 AM  Pad 3c [id number]      Tammi Loomis, ZACH   Nurse Practitioner   Cardiothoracic Surgery   Progress Notes       Cosign Needed   Date of Service:  10/01/21 0831   Creation Time:  10/01/21 113            Cosign Needed        Expand AllCollapse All      Show:Clear all  [x]Manual[x]Template[x]Copied    Added by:  [x]Tammi Loomis,  "ZACH    []Beckie for details  Patient name: Lamar Rodriguez  Patient : 2000  VISIT # [id number]  MR #[id number]     Procedure: Right thoracostomy tube placement  Procedure Date: 2021  POD:10  Bronchoscopy #1  POD 7  Bronchoscopy #2  POD 2         Subjective          Chief Complaint   Patient presents with   • Chest Pain      Resting in bed. On room air with O2 sat 94%. Chest physiotherapy was performed overnight. She is mobilizing secretions slightly better. IS 1500 ml. Right chest tube to waterseal. T max 100F overnight.      Telemetry: sinus 80-90s     ROS: no sweats or chills              Objective         Visit Vitals  BP 94/48 (BP Location: Left arm, Patient Position: Lying)   Pulse 83   Temp 99.5 °F (37.5 °C) (Axillary)   Resp 16   Ht 177.8 cm (70\")   Wt 65.1 kg (143 lb 8.3 oz)   LMP 2021 (Approximate)   SpO2 94%   Breastfeeding No   BMI 20.59 kg/m²         Intake/Output Summary (Last 24 hours) at 10/1/2021 1131  Last data filed at 10/1/2021 0600      Gross per 24 hour   Intake 980 ml   Output 1000 ml   Net -20 ml      R CT: No significant output ml/24 hours; no air leak, on waterseal      Lab:     CBC:         Results from last 7 days   Lab Units 10/01/21  0616 21  0620 21  0643   WBC 10*3/mm3 6.92 7.86 7.38   HEMATOCRIT % 29.9* 31.4* 34.6   PLATELETS 10*3/mm3 309 373 368            BMP:         Results from last 7 days   Lab Units 10/01/21  0616 21  0620 21  0643   SODIUM mmol/L 138 142 135*   POTASSIUM mmol/L 3.8 3.8 3.9   CHLORIDE mmol/L 104 105 101   CO2 mmol/L 26.0 29.0 28.0   GLUCOSE mg/dL 103* 104* 106*   BUN mg/dL 8 10 8   CREATININE mg/dL 0.34* 0.33* 0.32*            COAG:         Invalid input(s): PT         Blood Culture     Lab   Staphylococcus hominis ssp hominisCritical    [facility]       Isolated from Aerobic and Anaerobic Bottles                       Gram Stain  Critical     Lab   Aerobic Bottle Gram positive cocci in clusters  [facility]       Aerobic " Bottle Gram positive cocci in clusters [facility]              Susceptibility              Staphylococcus hominis ssp hominis       DORA       Oxacillin 2 ug/ml Resistant       Vancomycin <=0.5 ug/ml Susceptible                 Culture results from last 30 days   Lab 09/24/21  1709   FUNGUSCX No fungus isolated at less than 1 week            BAL Culture     Lab   >100,000 CFU/mL Staphylococcus aureus, MRSAAbnormal    [facility]   Methicillin resistant Staphylococcus aureus, Patient may be an isolation risk.            Gram Stain    Lab   Greater than 25 WBCs per low power field [facility]       No Epithelial cells per low power field [facility]       Few (2+) Gram positive cocci [facility]                     Specimen Collected: 09/24/21 17:09 Last Resulted: 09/26/21 13:12               IMAGES:                Imaging Results (Last 24 Hours)      Procedure Component Value Units Date/Time     XR Chest 1 View [[id number]] Collected: 10/01/21 0756       Updated: 10/01/21 0802     Narrative:       EXAM: XR CHEST 1 VW- 10/1/2021 5:14 AM CDT     HISTORY: pneumothorax; J96.01-Acute respiratory failure with hypoxia;  J93.9-Pneumothorax, unspecified; J18.9-Pneumonia, unspecified organism;  U07.1-COVID-19; J18.9-Pneumonia, unspecified organism;  T17.500A-Unspecified foreign body in bronchus causing asphyxiation,  initial encounter       COMPARISON: September 30, 2021.     TECHNIQUE: Frontal radiograph of the chest     FINDINGS:   Consolidation in the right lung base is again noted. This is similar to  September 30, 2021. Right chest tube is present. A loculated pleural  complex is present in the right upper chest. This is unchanged cardiac  silhouette is normal. Left lung is radiographically clear..      The osseous structures and surrounding soft tissues demonstrate no acute  abnormality.           Impression:       1. Stable chest with persistent consolidation in the right lung base.        2. Loculated right pleural complex is  also noted..        This report was finalized on 10/01/2021 07:59 by Dr. Joselo Wright MD.          CT: CT scan of the chest shows lateral superior portion of the lung probable pneumatocele with air-fluid interface is noted.  She has previously well-known cavitary changes as well.  Her right lower lobe does have some aeration but remains incompletely expanded.  She has once again right lower lobe mucous plugging.  There are biased to the right lung changes.     CXR today: right chest tube in place, right basilar consolidation-mild improvement in aeration        Physical Exam:  General: No acute distress, in good spirits. Resting in bed.   Cardiovascular: RRR, no murmur, rubs, or gallops.    Pulmonary: Clear to auscultation but with right lung fields diminished.   Abdomen: Soft, non distended, and non tender.  Extremities: Warm, moves all extremities.  Neurologic:  No focal deficits, CN II-XII intact grossly.    Chest: Right chest tube to waterseal.   Neurologic:  Grossly intact with no focal deficits.                 Impression:  Acute respiratory failure with hypoxia (CMS/HCC), resolved   Pneumothorax, s/p chest tube placement, no air leak on -20 cm H2O suction currently resolved  Pneumonia-MRSA  Right basilar consolidation persists  Recent COVID infection   Cavitary lung nodules, multiple   Pulmonary abscesses   Lactic acidosis, resolved   Bacteremia  Mucus plugging causing right lobar collapse         Plan:  Right chest tube to waterseal  Continue to push pulmonary toilet and ambulation. OK for chest physiotherapy.   Continue supportive care   Antibiotics per ID   DW patient and nursing  May need repeat bronchoscopy; repeat imaging in a few days            Tammi Loomis, APRN  10/01/21  11:31 CDT                   ED to Hosp-Admission (Current) on 9/21/2021        Current Facility-Administered Medications   Medication Dose Route Frequency Provider Last Rate Last Admin   • acetaminophen (TYLENOL) tablet 650  mg  650 mg Oral Q6H PRN Tammi Loomis APRN   650 mg at 10/01/21 0558   • budesonide-formoterol (SYMBICORT) 160-4.5 MCG/ACT inhaler 2 puff  2 puff Inhalation BID - RT Tammi Loomis APRN   2 puff at 10/01/21 0726   • docusate sodium (COLACE) capsule 100 mg  100 mg Oral Daily Tammi Loomis APRN   100 mg at 10/01/21 0938   • famotidine (PEPCID) tablet 20 mg  20 mg Oral BID Tammi Loomis APRN   20 mg at 10/01/21 0937   • guaiFENesin (MUCINEX) 12 hr tablet 1,200 mg  1,200 mg Oral BID Tammi Loomis APRN   1,200 mg at 10/01/21 0938   • ipratropium-albuterol (DUO-NEB) nebulizer solution 3 mL  3 mL Nebulization 4x Daily - RT Tammi Loomis APRN   3 mL at 10/01/21 1107   • ondansetron (ZOFRAN) injection 4 mg  4 mg Intravenous Q6H PRN Tammi Loomis APRN   4 mg at 09/24/21 0946   • sodium chloride 0.9 % flush 10 mL  10 mL Intravenous PRN Tammi Loomis APRN   10 mL at 09/29/21 1428   • vancomycin 1500 mg/500 mL 0.9% NS IVPB ([facility])  1,500 mg Intravenous Q8H Cesar Norton MD 0 mL/hr at 09/30/21 1335 1,500 mg at 10/01/21 0937

## 2021-10-01 NOTE — PROGRESS NOTES
[facility] Medicine Services  INPATIENT PROGRESS NOTE    Length of Stay: 10  Date of Admission: 9/21/2021  Primary Care Physician: Provider, No Known    Subjective   Chief Complaint: Covid pneumonia/MRSA pneumonia/pneumothorax/mucous plug/cavitary lesion    HPI   T-max 100.8.  T-current 98.3.  Blood pressures is low, patient is asymptomatic.  Patient eating well.    Review of Systems   Constitutional: Positive for activity change and appetite change. Negative for chills and fever.   HENT: Negative for hearing loss, nosebleeds, tinnitus and trouble swallowing.    Eyes: Negative for visual disturbance.   Respiratory: Positive for cough. Negative for chest tightness, shortness of breath and wheezing.    Cardiovascular: Negative for chest pain, palpitations and leg swelling.   Gastrointestinal: Negative for abdominal distention, abdominal pain, blood in stool, constipation, diarrhea, nausea and vomiting.   Endocrine: Negative for cold intolerance, heat intolerance, polydipsia, polyphagia and polyuria.   Genitourinary: Negative for decreased urine volume, difficulty urinating, dysuria, flank pain, frequency and hematuria.   Musculoskeletal: Negative for arthralgias, joint swelling and myalgias.   Skin: Negative for rash.   Allergic/Immunologic: Negative for immunocompromised state.   Neurological: Positive for weakness. Negative for dizziness, syncope, light-headedness and headaches.   Hematological: Negative for adenopathy. Does not bruise/bleed easily.   Psychiatric/Behavioral: Negative for confusion and sleep disturbance. The patient is not nervous/anxious.           All pertinent negatives and positives are as above. All other systems have been reviewed and are negative unless otherwise stated.     Objective    Temp:  [98.1 °F (36.7 °C)-100.8 °F (38.2 °C)] 98.7 °F (37.1 °C)  Heart Rate:  [] 80  Resp:  [14-16] 14  BP: ()/(40-48) 90/46    Intake/Output Summary (Last 24  hours) at 10/1/2021 1333  Last data filed at 10/1/2021 1138  Gross per 24 hour   Intake 500 ml   Output 2000 ml   Net -1500 ml     Physical Exam  Vitals and nursing note reviewed.   Constitutional:       Appearance: She is well-developed.   HENT:      Head: Normocephalic.   Eyes:      Conjunctiva/sclera: Conjunctivae normal.      Pupils: Pupils are equal, round, and reactive to light.   Neck:      Vascular: No JVD.   Cardiovascular:      Rate and Rhythm: Normal rate and regular rhythm.      Heart sounds: Normal heart sounds. No murmur heard.   No friction rub. No gallop.    Pulmonary:      Effort: No respiratory distress.      Breath sounds: Rhonchi right lower lobe.     Comments: Chest tube to right chest.  Diminished breath sound bilateral, more diminished right lower lobe.  Chest:      Chest wall: No tenderness.   Abdominal:      General: Bowel sounds are normal. There is no distension.      Palpations: Abdomen is soft.      Tenderness: There is no abdominal tenderness. There is no guarding or rebound.   Musculoskeletal:         General: No tenderness or deformity. Normal range of motion.      Cervical back: Neck supple.   Skin:     General: Skin is warm and dry.      Capillary Refill: Capillary refill takes 2 to 3 seconds.      Findings: No rash.   Neurological:      Mental Status: She is alert and oriented to person, place, and time.      Cranial Nerves: No cranial nerve deficit.      Motor: Weakness present. No abnormal muscle tone.      Deep Tendon Reflexes: Reflexes normal.   Psychiatric:         Behavior: Behavior normal.         Thought Content: Thought content normal.   Results Review:  Lab Results (last 24 hours)     Procedure Component Value Units Date/Time    Basic Metabolic Panel [[id number]]  (Abnormal) Collected: 10/01/21 0616    Specimen: Blood Updated: 10/01/21 0652     Glucose 103 mg/dL      BUN 8 mg/dL      Creatinine 0.34 mg/dL      Sodium 138 mmol/L      Potassium 3.8 mmol/L      Chloride 104  mmol/L      CO2 26.0 mmol/L      Calcium 8.5 mg/dL      eGFR Non African Amer >150 mL/min/1.73      BUN/Creatinine Ratio 23.5     Anion Gap 8.0 mmol/L     Narrative:      GFR Normal >60  Chronic Kidney Disease <60  Kidney Failure <15      CBC (No Diff) [[id number]]  (Abnormal) Collected: 10/01/21 0616    Specimen: Blood Updated: 10/01/21 0636     WBC 6.92 10*3/mm3      RBC 3.48 10*6/mm3      Hemoglobin 9.4 g/dL      Hematocrit 29.9 %      MCV 85.9 fL      MCH 27.0 pg      MCHC 31.4 g/dL      RDW 15.7 %      RDW-SD 48.8 fl      MPV 9.7 fL      Platelets 309 10*3/mm3            Cultures:  Respiratory Culture   Date Value Ref Range Status   09/24/2021 Test Cancelled  Final       Radiology Data:    Imaging Results (Last 24 Hours)     Procedure Component Value Units Date/Time    XR Chest 1 View [[id number]] Collected: 10/01/21 0756     Updated: 10/01/21 0802    Narrative:      EXAM: XR CHEST 1 VW- 10/1/2021 5:14 AM CDT     HISTORY: pneumothorax; J96.01-Acute respiratory failure with hypoxia;  J93.9-Pneumothorax, unspecified; J18.9-Pneumonia, unspecified organism;  U07.1-COVID-19; J18.9-Pneumonia, unspecified organism;  T17.500A-Unspecified foreign body in bronchus causing asphyxiation,  initial encounter       COMPARISON: September 30, 2021.     TECHNIQUE: Frontal radiograph of the chest     FINDINGS:   Consolidation in the right lung base is again noted. This is similar to  September 30, 2021. Right chest tube is present. A loculated pleural  complex is present in the right upper chest. This is unchanged cardiac  silhouette is normal. Left lung is radiographically clear..      The osseous structures and surrounding soft tissues demonstrate no acute  abnormality.          Impression:      1. Stable chest with persistent consolidation in the right lung base.        2. Loculated right pleural complex is also noted..        This report was finalized on 10/01/2021 07:59 by Dr. Joselo Wright MD.          No Known  Allergies    Scheduled meds:   budesonide-formoterol, 2 puff, Inhalation, BID - RT  docusate sodium, 100 mg, Oral, Daily  famotidine, 20 mg, Oral, BID  guaiFENesin, 1,200 mg, Oral, BID  ipratropium-albuterol, 3 mL, Nebulization, 4x Daily - RT  vancomycin, 1,500 mg, Intravenous, Q8H        PRN meds:  •  acetaminophen  •  ondansetron  •  sodium chloride    Assessment/Plan       Pneumonia of right lower lobe due to infectious organism    Acute respiratory failure with hypoxia (HCC)    Pneumothorax    Mucus plugging of bronchi      Plan:  Pneumothorax/Covid positive/hypoxia/right-sided cavitary pneumonia versus abscess.  Patient has not been vaccinated for Covid.  Infectious consult.  CT surgeon consult.  Pulmonary consulted.  Continue vancomycin.  Albuterol inhaler.  Symbicort.  Mucinex.  Flutter valve.  Incentive spirometer.  Patient completed dexamethasone.  Echocardiogram-ejection fraction 55%, borderline concentric hypertrophy, diastolic dysfunction is normal.   Status post bronchoscope with a mucous plug removed 9/24/2021.  Chest x-ray-No significant interval change, Dense consolidation in the right lower lobe appears stable, Right-sided chest tube is in stable position, Right-sided pneumothorax is essentially resolved.  CT of the chest-densely consolidated area within the right lung base is slightly smaller, small right pleural effusion is unchanged, Despite enlargement of 2 right upper lobe cysts or cavities with mildly increased internal fluid the overall appearance of the right upper lobe is that of improved expansion and partial clearing of parenchymal infiltrate.  Plan for bronchoscope by Dr. Isidro today 9/29/2021.  Right-sided chest tube in place.  Patient currently on room air.    Hypotension.  Asymptomatic.  Will follow.    Pain . Norco as needed.     Constipation.  Colace.     Lovenox prophylaxis.     Reflux.  Zofran as needed.  Pepcid.     Nutrition.  Regular diet.     Lavage culture-MRSA positive.   Fungal culture-no fungus less than a week.  Legionella-negative.  MRSA screening-positive. Strep pneumo-negative.  Covid-19-positive.  Mycoplasma pneumonia pending.  Blood culture-Staphylococcus hominis 1 out of 2 bottle.      Discharge Planning: Per CT surgeon.    Electronically signed by Tim Neil MD, 10/01/21, 1:33 PM CDT.

## 2021-10-02 ENCOUNTER — APPOINTMENT (OUTPATIENT)
Dept: GENERAL RADIOLOGY | Facility: HOSPITAL | Age: 21
End: 2021-10-02

## 2021-10-02 LAB
ANION GAP SERPL CALCULATED.3IONS-SCNC: 9 MMOL/L (ref 5–15)
BUN SERPL-MCNC: 8 MG/DL (ref 6–20)
BUN/CREAT SERPL: 26.7 (ref 7–25)
CALCIUM SPEC-SCNC: 8.5 MG/DL (ref 8.6–10.5)
CHLORIDE SERPL-SCNC: 104 MMOL/L (ref 98–107)
CO2 SERPL-SCNC: 25 MMOL/L (ref 22–29)
CREAT SERPL-MCNC: 0.3 MG/DL (ref 0.57–1)
DEPRECATED RDW RBC AUTO: 47.7 FL (ref 37–54)
ERYTHROCYTE [DISTWIDTH] IN BLOOD BY AUTOMATED COUNT: 15.8 % (ref 12.3–15.4)
GFR SERPL CREATININE-BSD FRML MDRD: >150 ML/MIN/1.73
GLUCOSE SERPL-MCNC: 103 MG/DL (ref 65–99)
HCT VFR BLD AUTO: 28.9 % (ref 34–46.6)
HGB BLD-MCNC: 9 G/DL (ref 12–15.9)
MAGNESIUM SERPL-MCNC: 1.7 MG/DL (ref 1.6–2.6)
MCH RBC QN AUTO: 26.1 PG (ref 26.6–33)
MCHC RBC AUTO-ENTMCNC: 31.1 G/DL (ref 31.5–35.7)
MCV RBC AUTO: 83.8 FL (ref 79–97)
PLATELET # BLD AUTO: 266 10*3/MM3 (ref 140–450)
PMV BLD AUTO: 9.9 FL (ref 6–12)
POTASSIUM SERPL-SCNC: 3.4 MMOL/L (ref 3.5–5.2)
RBC # BLD AUTO: 3.45 10*6/MM3 (ref 3.77–5.28)
SODIUM SERPL-SCNC: 138 MMOL/L (ref 136–145)
VANCOMYCIN TROUGH SERPL-MCNC: 16.9 MCG/ML (ref 5–20)
WBC # BLD AUTO: 6.27 10*3/MM3 (ref 3.4–10.8)

## 2021-10-02 PROCEDURE — 25010000002 VANCOMYCIN 10 G RECONSTITUTED SOLUTION: Performed by: INTERNAL MEDICINE

## 2021-10-02 PROCEDURE — 94799 UNLISTED PULMONARY SVC/PX: CPT

## 2021-10-02 PROCEDURE — 85027 COMPLETE CBC AUTOMATED: CPT | Performed by: NURSE PRACTITIONER

## 2021-10-02 PROCEDURE — 94669 MECHANICAL CHEST WALL OSCILL: CPT

## 2021-10-02 PROCEDURE — 80048 BASIC METABOLIC PNL TOTAL CA: CPT | Performed by: NURSE PRACTITIONER

## 2021-10-02 PROCEDURE — 71045 X-RAY EXAM CHEST 1 VIEW: CPT

## 2021-10-02 PROCEDURE — 83735 ASSAY OF MAGNESIUM: CPT | Performed by: FAMILY MEDICINE

## 2021-10-02 PROCEDURE — 80202 ASSAY OF VANCOMYCIN: CPT | Performed by: INTERNAL MEDICINE

## 2021-10-02 PROCEDURE — 99231 SBSQ HOSP IP/OBS SF/LOW 25: CPT | Performed by: THORACIC SURGERY (CARDIOTHORACIC VASCULAR SURGERY)

## 2021-10-02 RX ORDER — POTASSIUM CHLORIDE 750 MG/1
40 CAPSULE, EXTENDED RELEASE ORAL 2 TIMES DAILY
Status: COMPLETED | OUTPATIENT
Start: 2021-10-02 | End: 2021-10-02

## 2021-10-02 RX ORDER — LINEZOLID 600 MG/1
600 TABLET, FILM COATED ORAL EVERY 12 HOURS SCHEDULED
Status: DISCONTINUED | OUTPATIENT
Start: 2021-10-03 | End: 2021-10-06 | Stop reason: HOSPADM

## 2021-10-02 RX ADMIN — IPRATROPIUM BROMIDE AND ALBUTEROL SULFATE 3 ML: 2.5; .5 SOLUTION RESPIRATORY (INHALATION) at 18:19

## 2021-10-02 RX ADMIN — POTASSIUM CHLORIDE 40 MEQ: 10 CAPSULE, COATED, EXTENDED RELEASE ORAL at 08:22

## 2021-10-02 RX ADMIN — GUAIFENESIN 1200 MG: 600 TABLET, EXTENDED RELEASE ORAL at 08:19

## 2021-10-02 RX ADMIN — IPRATROPIUM BROMIDE AND ALBUTEROL SULFATE 3 ML: 2.5; .5 SOLUTION RESPIRATORY (INHALATION) at 14:30

## 2021-10-02 RX ADMIN — VANCOMYCIN HYDROCHLORIDE 1500 MG: 10 INJECTION, POWDER, LYOPHILIZED, FOR SOLUTION INTRAVENOUS at 17:22

## 2021-10-02 RX ADMIN — DOCUSATE SODIUM 100 MG: 100 CAPSULE ORAL at 08:19

## 2021-10-02 RX ADMIN — POTASSIUM CHLORIDE 40 MEQ: 10 CAPSULE, COATED, EXTENDED RELEASE ORAL at 20:27

## 2021-10-02 RX ADMIN — FAMOTIDINE 20 MG: 20 TABLET, FILM COATED ORAL at 08:19

## 2021-10-02 RX ADMIN — VANCOMYCIN HYDROCHLORIDE 1500 MG: 10 INJECTION, POWDER, LYOPHILIZED, FOR SOLUTION INTRAVENOUS at 01:28

## 2021-10-02 RX ADMIN — FAMOTIDINE 20 MG: 20 TABLET, FILM COATED ORAL at 20:27

## 2021-10-02 RX ADMIN — IPRATROPIUM BROMIDE AND ALBUTEROL SULFATE 3 ML: 2.5; .5 SOLUTION RESPIRATORY (INHALATION) at 06:34

## 2021-10-02 RX ADMIN — BUDESONIDE AND FORMOTEROL FUMARATE DIHYDRATE 2 PUFF: 160; 4.5 AEROSOL RESPIRATORY (INHALATION) at 06:36

## 2021-10-02 RX ADMIN — GUAIFENESIN 1200 MG: 600 TABLET, EXTENDED RELEASE ORAL at 20:27

## 2021-10-02 RX ADMIN — IPRATROPIUM BROMIDE AND ALBUTEROL SULFATE 3 ML: 2.5; .5 SOLUTION RESPIRATORY (INHALATION) at 10:51

## 2021-10-02 RX ADMIN — VANCOMYCIN HYDROCHLORIDE 1500 MG: 10 INJECTION, POWDER, LYOPHILIZED, FOR SOLUTION INTRAVENOUS at 10:15

## 2021-10-02 RX ADMIN — BUDESONIDE AND FORMOTEROL FUMARATE DIHYDRATE 2 PUFF: 160; 4.5 AEROSOL RESPIRATORY (INHALATION) at 18:19

## 2021-10-02 NOTE — PROGRESS NOTES
Pharmacy Dosing Service  Antimicrobial  Vancomycin    Assessment/Plan:  Current dosage: Vancomycin 1500 mg IVPB every 8 hours  Indication: pneumonia  Start date: 09/21/21  Current end date: 10/04/21    Level(s): 10/02/21 trough = 16.9 mcg/mL, ~6.75 hours post-dose 1500 mg    Model: Adult / general ([place] 2009  Regimen: 1500 mg IV every 8 hours.  Exposure target: AUC24 (range)400-600 mg/L.hr   AUC24,ss: 528 mg/L.hr  PAUC*: 91 %  Ctrough,ss: 12.8 mg/L  Pconc*: 7 %  Tox.: 8 %    10/02/21 Vancomycin trough returned with therapeutic range and correlates with drug exposure target. No dosage adjustment needed at this time. Pharmacy will continue to follow daily.     Subjective:  Lamar Rodriguez is a 21 y.o. female admitted 9/21/2021  7:13 AM.    (J96.01) Acute respiratory failure with hypoxia (CMS/HCC) - Type II respiratory failure    (J93.9) Pneumothorax on right    (J18.9) Multifocal pneumonia    (U07.1) COVID-19    (J18.9) Pneumonia of right lower lobe due to infectious organism - Plan: Case Request, Case Request, Fungus Culture - Lavage, Lung, Right Lower Lobe, Fungus Culture - Lavage, Lung, Right Lower Lobe, Respiratory Culture - Lavage, Lung, Right Lower Lobe, Respiratory Culture - Lavage, Lung, Right Lower Lobe, BAL Culture, Quantitative - Lavage, Lung, Right Lower Lobe, BAL Culture, Quantitative - Lavage, Lung, Right Lower Lobe    (J98.09) Mucus plugging of bronchi - Plan: Case request, Case request    Anti-Infectives (From admission, onward)      Ordered     Dose/Rate Route Frequency Start Stop    09/29/21 1615  vancomycin 1500 mg/500 mL 0.9% NS IVPB ([facility])     Elan Baltazar, PharmD reviewed the order on 10/01/21 1158.   Ordering Provider: Cesar Norton MD    1,500 mg  over 180 Minutes Intravenous Every 8 Hours 09/29/21 1700 10/04/21 1659    09/21/21 1622  meropenem (MERREM) 1 g in sodium chloride 0.9 % 100 mL IVPB-VTB     Ordering Provider: Chepe Lewis MD    1 g  over 30 Minutes Intravenous Once  "09/21/21 1800 09/21/21 1941    09/21/21 1612  vancomycin 1250 mg/250 mL 0.9% NS IVPB ([facility])     Ordering Provider: Chepe Lewis MD    1,250 mg  over 90 Minutes Intravenous Once 09/21/21 1700 09/21/21 1952 09/21/21 0900  piperacillin-tazobactam (ZOSYN) IVPB 4.5 g in 100 mL NS VTB     Ordering Provider: Slade Willoughby MD    4.5 g  over 30 Minutes Intravenous Once 09/21/21 0902 09/21/21 1132          Past Medical / Surgical / Social History reviewed.     Objective:  Ht: 177.8 cm (70\"); Wt: 65.1 kg (143 lb 8.3 oz) Body mass index is 20.59 kg/m².      BUN   Date Value Ref Range Status   10/02/2021 8 6 - 20 mg/dL Final   10/01/2021 8 6 - 20 mg/dL Final   09/30/2021 10 6 - 20 mg/dL Final      Creatinine   Date Value Ref Range Status   10/02/2021 0.30 (L) 0.57 - 1.00 mg/dL Final   10/01/2021 0.34 (L) 0.57 - 1.00 mg/dL Final   09/30/2021 0.33 (L) 0.57 - 1.00 mg/dL Final      Lab Results   Component Value Date    WBC 6.27 10/02/2021    WBC 6.92 10/01/2021    WBC 7.86 09/30/2021      Lab Results   Component Value Date    ALBUMIN 2.60 (L) 09/22/2021       Lab Results   Component Value Date    VANCOTROUGH 16.90 10/02/2021         Culture Results:  Microbiology Results (last 10 days)       Procedure Component Value - Date/Time    COVID PRE-OP / PRE-PROCEDURE SCREENING ORDER (NO ISOLATION) - Swab, Nasal Cavity [[id number]]  (Normal) Collected: 09/28/21 2104    Lab Status: Final result Specimen: Swab from Nasal Cavity Updated: 09/28/21 2200    Narrative:      The following orders were created for panel order COVID PRE-OP / PRE-PROCEDURE SCREENING ORDER (NO ISOLATION) - Swab, Nasal Cavity.  Procedure                               Abnormality         Status                     ---------                               -----------         ------                     COVID-19,[facility]...[[id number]]  Normal              Final result                 Please view results for these tests on the individual orders.    " COVID-19,[facility] IN-HOUSE,Nasal Swab No Transport Media 3-4 HR TAT - Swab, Nasal Cavity [[id number]]  (Normal) Collected: 09/28/21 2104    Lab Status: Final result Specimen: Swab from Nasal Cavity Updated: 09/28/21 2200     COVID19 Not Detected    Narrative:      Fact sheet for providers: https://www.fda.gov/media/523724/download     Fact sheet for patients: https://www.fda.gov/media/148617/download    Test performed by PCR.    Consider negative results in combination with clinical observations, patient history, and epidemiological information.  Fact sheet for providers: https://www.fda.gov/media/764405/download     Fact sheet for patients: https://www.[employer].gov/media/639863/download    Test performed by PCR.    Consider negative results in combination with clinical observations, patient history, and epidemiological information.    Fungus Culture - Lavage, Lung, Right Lower Lobe [[id number]] Collected: 09/24/21 1709    Lab Status: Preliminary result Specimen: Lavage from Lung, Right Lower Lobe Updated: 10/01/21 1801     Fungus Culture No fungus isolated at 1 week    Respiratory Culture - Lavage, Lung, Right Lower Lobe [[id number]] Collected: 09/24/21 1709    Lab Status: Final result Specimen: Lavage from Lung, Right Lower Lobe Updated: 09/26/21 1308     Respiratory Culture Test Cancelled     Gram Stain Greater than 25 WBCs per low power field      Rare (1+) Epithelial cells per low power field      Few (2+) Gram positive cocci    Narrative:      Same specimen culture ordered twice.    BAL Culture, Quantitative - Lavage, Lung, Right Lower Lobe [[id number]]  (Abnormal)  (Susceptibility) Collected: 09/24/21 1709    Lab Status: Final result Specimen: Lavage from Lung, Right Lower Lobe Updated: 09/27/21 0952     BAL Culture >100,000 CFU/mL Staphylococcus aureus, MRSA     Comment: Methicillin resistant Staphylococcus aureus, Patient may be an isolation risk.        Gram Stain Greater than 25 WBCs per low power field      No  Epithelial cells per low power field      Few (2+) Gram positive cocci    Susceptibility        Staphylococcus aureus, MRSA      DORA      Clindamycin Susceptible      Linezolid Susceptible      Oxacillin Resistant      Penicillin G Resistant      Tetracycline Susceptible      Trimethoprim + Sulfamethoxazole Susceptible      Vancomycin Susceptible                 Linear View                       Mycoplasma Pneumoniae PCR - Swab, Nasopharynx [[id number]] Collected: 09/22/21 1242    Lab Status: Final result Specimen: Swab from Nasopharynx Updated: 09/27/21 2306     Mycoplasma pneumo by PCR Negative     Comment: No Mycoplasma pneumoniae DNA detected.  This test was developed and its performance characteristics determined  by [facility].  It has not been cleared or approved by the Food and Drug  Administration.  The FDA has determined that such clearance or  approval is not necessary.       Narrative:      Performed at:  [address]  [id number]  : Jeff Barrientos MD, Phone:  [phone]            Elan Baltazar, PharmD  10/02/21 09:11 CDT

## 2021-10-02 NOTE — PROGRESS NOTES
[facility] Medicine Services  INPATIENT PROGRESS NOTE    Length of Stay: 11  Date of Admission: 9/21/2021  Primary Care Physician: Provider, No Known    Subjective   Chief Complaint:Covid pneumonia/MRSA pneumonia/pneumothorax/mucous plug/cavitary lesion    HPI   T-max 99.9.  T-current 98.9.  Blood pressure remained low side, patient is asymptomatic.  Patient currently doing chest percussion.  Chest tube in place.    Review of Systems   Constitutional: Positive for activity change and appetite change. Negative for chills and fever.   HENT: Negative for hearing loss, nosebleeds, tinnitus and trouble swallowing.    Eyes: Negative for visual disturbance.   Respiratory: Positive for cough. Negative for chest tightness, shortness of breath and wheezing.    Cardiovascular: Negative for chest pain, palpitations and leg swelling.   Gastrointestinal: Negative for abdominal distention, abdominal pain, blood in stool, constipation, diarrhea, nausea and vomiting.   Endocrine: Negative for cold intolerance, heat intolerance, polydipsia, polyphagia and polyuria.   Genitourinary: Negative for decreased urine volume, difficulty urinating, dysuria, flank pain, frequency and hematuria.   Musculoskeletal: Negative for arthralgias, joint swelling and myalgias.   Skin: Negative for rash.   Allergic/Immunologic: Negative for immunocompromised state.   Neurological: Positive for weakness. Negative for dizziness, syncope, light-headedness and headaches.   Hematological: Negative for adenopathy. Does not bruise/bleed easily.   Psychiatric/Behavioral: Negative for confusion and sleep disturbance. The patient is not nervous/anxious.      All pertinent negatives and positives are as above. All other systems have been reviewed and are negative unless otherwise stated.     Objective    Temp:  [98.5 °F (36.9 °C)-99.9 °F (37.7 °C)] 98.7 °F (37.1 °C)  Heart Rate:  [] 89  Resp:  [16] 16  BP: ()/(42-55)  91/48    Intake/Output Summary (Last 24 hours) at 10/2/2021 1519  Last data filed at 10/1/2021 2052  Gross per 24 hour   Intake 500 ml   Output 701 ml   Net -201 ml     Physical Exam  Vitals and nursing note reviewed.   Constitutional:       Appearance: She is well-developed.   HENT:      Head: Normocephalic.   Eyes:      Conjunctiva/sclera: Conjunctivae normal.      Pupils: Pupils are equal, round, and reactive to light.   Neck:      Vascular: No JVD.   Cardiovascular:      Rate and Rhythm: Normal rate and regular rhythm.      Heart sounds: Normal heart sounds. No murmur heard.   No friction rub. No gallop.    Pulmonary:      Effort: No respiratory distress.      Breath sounds: Rhonchi right lower lobe.     Comments: Chest tube to right chest.  Diminished breath sound bilateral, more diminished right lower lobe.  Chest:      Chest wall: No tenderness.   Abdominal:      General: Bowel sounds are normal. There is no distension.      Palpations: Abdomen is soft.      Tenderness: There is no abdominal tenderness. There is no guarding or rebound.   Musculoskeletal:         General: No tenderness or deformity. Normal range of motion.      Cervical back: Neck supple.   Skin:     General: Skin is warm and dry.      Capillary Refill: Capillary refill takes 2 to 3 seconds.      Findings: No rash.   Neurological:      Mental Status: She is alert and oriented to person, place, and time.      Cranial Nerves: No cranial nerve deficit.      Motor: Weakness present. No abnormal muscle tone.      Deep Tendon Reflexes: Reflexes normal.   Psychiatric:         Behavior: Behavior normal.         Thought Content: Thought content normal.   Results Review:  Lab Results (last 24 hours)     Procedure Component Value Units Date/Time    Magnesium [[id number]]  (Normal) Collected: 10/02/21 0431    Specimen: Blood Updated: 10/02/21 1041     Magnesium 1.7 mg/dL     Vancomycin, Trough [[id number]]  (Normal) Collected: 10/02/21 0814    Specimen:  Blood Updated: 10/02/21 0901     Vancomycin Trough 16.90 mcg/mL     Basic Metabolic Panel [[id number]]  (Abnormal) Collected: 10/02/21 0431    Specimen: Blood Updated: 10/02/21 0538     Glucose 103 mg/dL      BUN 8 mg/dL      Creatinine 0.30 mg/dL      Sodium 138 mmol/L      Potassium 3.4 mmol/L      Chloride 104 mmol/L      CO2 25.0 mmol/L      Calcium 8.5 mg/dL      eGFR Non African Amer >150 mL/min/1.73      BUN/Creatinine Ratio 26.7     Anion Gap 9.0 mmol/L     Narrative:      GFR Normal >60  Chronic Kidney Disease <60  Kidney Failure <15      CBC (No Diff) [[id number]]  (Abnormal) Collected: 10/02/21 0431    Specimen: Blood Updated: 10/02/21 0515     WBC 6.27 10*3/mm3      RBC 3.45 10*6/mm3      Hemoglobin 9.0 g/dL      Hematocrit 28.9 %      MCV 83.8 fL      MCH 26.1 pg      MCHC 31.1 g/dL      RDW 15.8 %      RDW-SD 47.7 fl      MPV 9.9 fL      Platelets 266 10*3/mm3            Cultures:  No results found for: BLOODCX, URINECX, WOUNDCX, MRSACX, RESPCX, STOOLCX    Radiology Data:    Imaging Results (Last 24 Hours)     Procedure Component Value Units Date/Time    XR Chest 1 View [[id number]] Collected: 10/02/21 0924     Updated: 10/02/21 0930    Narrative:      EXAMINATION: XR CHEST 1 VW-. 10/2/2021 9:24 AM CDT     CHEST, ONE VIEW:     HISTORY: Pneumothorax     COMPARISON: 10/1/2021, 9/30/2021 9/29/2021     A single frontal chest radiograph was obtained.     FINDINGS:     Right chest tube again identified without measurable pneumothorax. Right  perihilar and lower lobe airspace consolidation identified again with  right-sided pleural reaction with masslike pleural-based opacities  imaged in the right upper lobe laterally.     The left lung is grossly clear.                                     Impression:      1. Stable one-day appearance the chest.     This report was finalized on 10/02/2021 09:27 by Dr. Ricardo Bennett MD.          No Known Allergies    Scheduled meds:   budesonide-formoterol, 2 puff, Inhalation,  BID - RT  docusate sodium, 100 mg, Oral, Daily  famotidine, 20 mg, Oral, BID  guaiFENesin, 1,200 mg, Oral, BID  ipratropium-albuterol, 3 mL, Nebulization, 4x Daily - RT  [START ON 10/3/2021] linezolid, 600 mg, Oral, Q12H  potassium chloride, 40 mEq, Oral, BID  vancomycin, 1,500 mg, Intravenous, Q8H        PRN meds:  •  acetaminophen  •  ondansetron  •  sodium chloride    Assessment/Plan       Pneumonia of right lower lobe due to infectious organism    Acute respiratory failure with hypoxia (HCC)    Pneumothorax    Mucus plugging of bronchi      Plan:  Pneumothorax/Covid positive/hypoxia/right-sided cavitary pneumonia versus abscess.  Patient has not been vaccinated for Covid.  Infectious consult.  CT surgeon consult.  Pulmonary consulted.  Continue vancomycin.  Albuterol inhaler.  Symbicort.  Mucinex.  Flutter valve.  Incentive spirometer.  Patient completed dexamethasone.  Echocardiogram-ejection fraction 55%, borderline concentric hypertrophy, diastolic dysfunction is normal.   Status post bronchoscope with a mucous plug removed 9/24/2021.  Chest x-ray-No significant interval change, Dense consolidation in the right lower lobe appears stable, Right-sided chest tube is in stable position, Right-sided pneumothorax is essentially resolved.  CT of the chest-densely consolidated area within the right lung base is slightly smaller, small right pleural effusion is unchanged, Despite enlargement of 2 right upper lobe cysts or cavities with mildly increased internal fluid the overall appearance of the right upper lobe is that of improved expansion and partial clearing of parenchymal infiltrate.  Plan for bronchoscope by Dr. Isidro today 9/29/2021.  Right-sided chest tube in place.  Patient currently on room air.    Hypokalemia . p.o. potassium.  Magnesium level-stable.     Hypotension.  Asymptomatic.  Will follow.     Pain . Norco as needed.     Constipation.  Colace.     Lovenox prophylaxis.     Reflux.  Zofran as needed.   Pepcid.     Nutrition.  Regular diet.     Lavage culture-MRSA positive.  Fungal culture-no fungus less than a week.  Legionella-negative.  MRSA screening-positive. Strep pneumo-negative.  Covid-19-positive.  Mycoplasma pneumonia pending.  Blood culture-Staphylococcus hominis 1 out of 2 bottle.      Discharge Planning: Per CT surgeon.    Electronically signed by Tim Neil MD, 10/02/21, 3:20 PM CDT.

## 2021-10-02 NOTE — PROGRESS NOTES
"Infectious Diseases Progress Note    Patient:  Lamar Rodriguez  YOB: 2000  MRN: [id number]   Admit date: 9/21/2021   Admitting Physician: Tim Neil MD  Primary Care Physician: Provider, No Known    Chief Complaint/Interval History:     Intake/Output Summary (Last 24 hours) at 10/2/2021 1444  Last data filed at 10/1/2021 2052  Gross per 24 hour   Intake 500 ml   Output 701 ml   Net -201 ml     Allergies: No Known Allergies  Current Scheduled Medications:   budesonide-formoterol, 2 puff, Inhalation, BID - RT  docusate sodium, 100 mg, Oral, Daily  famotidine, 20 mg, Oral, BID  guaiFENesin, 1,200 mg, Oral, BID  ipratropium-albuterol, 3 mL, Nebulization, 4x Daily - RT  [START ON 10/3/2021] linezolid, 600 mg, Oral, Q12H  potassium chloride, 40 mEq, Oral, BID  vancomycin, 1,500 mg, Intravenous, Q8H      Current PRN Medications:  •  acetaminophen  •  ondansetron  •  sodium chloride    Review of Systems    Vital Signs:  BP 91/48 (BP Location: Left arm, Patient Position: Sitting)   Pulse 89   Temp 98.7 °F (37.1 °C) (Oral)   Resp 16   Ht 177.8 cm (70\")   Wt 65.1 kg (143 lb 8.3 oz)   LMP 09/22/2021 (Approximate)   SpO2 93%   Breastfeeding No   BMI 20.59 kg/m²     Physical Exam  Vital signs - reviewed.  Line/IV site - No erythema, warmth, induration, or tenderness.    Lab Results:  CBC:   Results from last 7 days   Lab Units 10/02/21  0431 10/01/21  0616 09/30/21  0620 09/29/21  0643 09/27/21  2355   WBC 10*3/mm3 6.27 6.92 7.86 7.38 10.74   HEMOGLOBIN g/dL 9.0* 9.4* 9.5* 10.7* 10.5*   HEMATOCRIT % 28.9* 29.9* 31.4* 34.6 31.6*   PLATELETS 10*3/mm3 266 309 373 368 425     BMP:  Results from last 7 days   Lab Units 10/02/21  0431 10/01/21  0616 10/01/21  0616 09/30/21  0620 09/30/21  0620 09/29/21  0643 09/29/21  0643 09/27/21 2355 09/27/21 2355   SODIUM mmol/L 138  --  138  --  142  --  135*  --  136   POTASSIUM mmol/L 3.4*  --  3.8  --  3.8  --  3.9  --  3.9   CHLORIDE mmol/L 104  --  104  --  105  --  " 101  --  102   CO2 mmol/L 25.0  --  26.0  --  29.0  --  28.0  --  26.0   BUN mg/dL 8  --  8  --  10  --  8  --  9   CREATININE mg/dL 0.30*  --  0.34*  --  0.33*  --  0.32*  --  0.30*   GLUCOSE mg/dL 103*   < > 103*   < > 104*   < > 106*   < > 103*   CALCIUM mg/dL 8.5*  --  8.5*  --  8.6  --  8.6  --  8.2*    < > = values in this interval not displayed.     Culture Results:   Quantitative BAL cultures collected September 24, 2021:  Staphylococcus aureus, MRSA       DORA     Clindamycin <=0.12 ug/ml Susceptible     Linezolid 2 ug/ml Susceptible     Oxacillin >=4 ug/ml Resistant     Penicillin G >=0.5 ug/ml Resistant     Tetracycline <=1 ug/ml Susceptible     Trimethoprim + Sulfamethoxazole <=10 ug/ml Susceptible     Vancomycin <=0.5 ug/ml Susceptible      Radiology:   Chest x-ray today:  Right chest tube again identified without measurable pneumothorax. Right  perihilar and lower lobe airspace consolidation identified again with  right-sided pleural reaction with masslike pleural-based opacities  imaged in the right upper lobe laterally.  The left lung is grossly clear.                     IMPRESSION:  1. Stable one-day appearance the chest.  This report was finalized on 10/02/2021 09:27 by Dr. Ricardo Bennett MD.    Additional Studies Reviewed: None    Impression:   Right middle and lower lobe pneumonia/mucous plugging/heavy growth MRSA on BAL quantitative culture    Recommendations:   Discontinue vancomycin after today  Begin oral linezolid  Continue to encourage incentive spirometry, walking, sitting in chair, and chest physical therapy    Cesar Guevara MD

## 2021-10-03 ENCOUNTER — APPOINTMENT (OUTPATIENT)
Dept: GENERAL RADIOLOGY | Facility: HOSPITAL | Age: 21
End: 2021-10-03

## 2021-10-03 LAB
ANION GAP SERPL CALCULATED.3IONS-SCNC: 12 MMOL/L (ref 5–15)
BUN SERPL-MCNC: 8 MG/DL (ref 6–20)
BUN/CREAT SERPL: 22.9 (ref 7–25)
CALCIUM SPEC-SCNC: 8.6 MG/DL (ref 8.6–10.5)
CHLORIDE SERPL-SCNC: 101 MMOL/L (ref 98–107)
CO2 SERPL-SCNC: 22 MMOL/L (ref 22–29)
CREAT SERPL-MCNC: 0.35 MG/DL (ref 0.57–1)
DEPRECATED RDW RBC AUTO: 47.1 FL (ref 37–54)
ERYTHROCYTE [DISTWIDTH] IN BLOOD BY AUTOMATED COUNT: 15.7 % (ref 12.3–15.4)
GFR SERPL CREATININE-BSD FRML MDRD: >150 ML/MIN/1.73
GLUCOSE SERPL-MCNC: 133 MG/DL (ref 65–99)
HCT VFR BLD AUTO: 30.6 % (ref 34–46.6)
HGB BLD-MCNC: 9.7 G/DL (ref 12–15.9)
MCH RBC QN AUTO: 26.2 PG (ref 26.6–33)
MCHC RBC AUTO-ENTMCNC: 31.7 G/DL (ref 31.5–35.7)
MCV RBC AUTO: 82.7 FL (ref 79–97)
PLATELET # BLD AUTO: 269 10*3/MM3 (ref 140–450)
PMV BLD AUTO: 10.1 FL (ref 6–12)
POTASSIUM SERPL-SCNC: 3.7 MMOL/L (ref 3.5–5.2)
RBC # BLD AUTO: 3.7 10*6/MM3 (ref 3.77–5.28)
SODIUM SERPL-SCNC: 135 MMOL/L (ref 136–145)
WBC # BLD AUTO: 6.31 10*3/MM3 (ref 3.4–10.8)

## 2021-10-03 PROCEDURE — 94669 MECHANICAL CHEST WALL OSCILL: CPT

## 2021-10-03 PROCEDURE — 99231 SBSQ HOSP IP/OBS SF/LOW 25: CPT | Performed by: THORACIC SURGERY (CARDIOTHORACIC VASCULAR SURGERY)

## 2021-10-03 PROCEDURE — 94799 UNLISTED PULMONARY SVC/PX: CPT

## 2021-10-03 PROCEDURE — 71045 X-RAY EXAM CHEST 1 VIEW: CPT

## 2021-10-03 PROCEDURE — 80048 BASIC METABOLIC PNL TOTAL CA: CPT | Performed by: NURSE PRACTITIONER

## 2021-10-03 PROCEDURE — 63710000001 ONDANSETRON ODT 4 MG TABLET DISPERSIBLE: Performed by: FAMILY MEDICINE

## 2021-10-03 PROCEDURE — 25010000002 ONDANSETRON PER 1 MG: Performed by: NURSE PRACTITIONER

## 2021-10-03 PROCEDURE — 85027 COMPLETE CBC AUTOMATED: CPT | Performed by: NURSE PRACTITIONER

## 2021-10-03 RX ORDER — ONDANSETRON 4 MG/1
4 TABLET, ORALLY DISINTEGRATING ORAL EVERY 6 HOURS PRN
Status: DISCONTINUED | OUTPATIENT
Start: 2021-10-03 | End: 2021-10-06 | Stop reason: HOSPADM

## 2021-10-03 RX ADMIN — GUAIFENESIN 1200 MG: 600 TABLET, EXTENDED RELEASE ORAL at 08:52

## 2021-10-03 RX ADMIN — DOCUSATE SODIUM 100 MG: 100 CAPSULE ORAL at 08:52

## 2021-10-03 RX ADMIN — IPRATROPIUM BROMIDE AND ALBUTEROL SULFATE 3 ML: 2.5; .5 SOLUTION RESPIRATORY (INHALATION) at 21:21

## 2021-10-03 RX ADMIN — LINEZOLID 600 MG: 600 TABLET, FILM COATED ORAL at 08:53

## 2021-10-03 RX ADMIN — IPRATROPIUM BROMIDE AND ALBUTEROL SULFATE 3 ML: 2.5; .5 SOLUTION RESPIRATORY (INHALATION) at 14:09

## 2021-10-03 RX ADMIN — BUDESONIDE AND FORMOTEROL FUMARATE DIHYDRATE 2 PUFF: 160; 4.5 AEROSOL RESPIRATORY (INHALATION) at 06:15

## 2021-10-03 RX ADMIN — FAMOTIDINE 20 MG: 20 TABLET, FILM COATED ORAL at 20:31

## 2021-10-03 RX ADMIN — BUDESONIDE AND FORMOTEROL FUMARATE DIHYDRATE 2 PUFF: 160; 4.5 AEROSOL RESPIRATORY (INHALATION) at 21:21

## 2021-10-03 RX ADMIN — IPRATROPIUM BROMIDE AND ALBUTEROL SULFATE 3 ML: 2.5; .5 SOLUTION RESPIRATORY (INHALATION) at 10:06

## 2021-10-03 RX ADMIN — ACETAMINOPHEN 650 MG: 325 TABLET, FILM COATED ORAL at 00:59

## 2021-10-03 RX ADMIN — LINEZOLID 600 MG: 600 TABLET, FILM COATED ORAL at 00:17

## 2021-10-03 RX ADMIN — ACETAMINOPHEN 650 MG: 325 TABLET, FILM COATED ORAL at 15:54

## 2021-10-03 RX ADMIN — FAMOTIDINE 20 MG: 20 TABLET, FILM COATED ORAL at 08:52

## 2021-10-03 RX ADMIN — IPRATROPIUM BROMIDE AND ALBUTEROL SULFATE 3 ML: 2.5; .5 SOLUTION RESPIRATORY (INHALATION) at 06:14

## 2021-10-03 RX ADMIN — GUAIFENESIN 1200 MG: 600 TABLET, EXTENDED RELEASE ORAL at 20:31

## 2021-10-03 RX ADMIN — LINEZOLID 600 MG: 600 TABLET, FILM COATED ORAL at 20:31

## 2021-10-03 RX ADMIN — ONDANSETRON 4 MG: 4 TABLET, ORALLY DISINTEGRATING ORAL at 16:30

## 2021-10-03 NOTE — PROGRESS NOTES
[facility] Medicine Services  INPATIENT PROGRESS NOTE    Length of Stay: 12  Date of Admission: 9/21/2021  Primary Care Physician: Provider, No Known    Subjective   Chief Complaint: Covid pneumonia/MRSA pneumonia/pneumothorax/mucous plug/cavitary lesion    HPI   Blood pressure remained low, asymptomatic.  T-max 100.7.  T-current 99.3.  Patient denies any shortness of breath.    Review of Systems   Constitutional: Positive for activity change and appetite change. Negative for chills and fever.   HENT: Negative for hearing loss, nosebleeds, tinnitus and trouble swallowing.    Eyes: Negative for visual disturbance.   Respiratory: Positive for cough. Negative for chest tightness, shortness of breath and wheezing.    Cardiovascular: Negative for chest pain, palpitations and leg swelling.   Gastrointestinal: Negative for abdominal distention, abdominal pain, blood in stool, constipation, diarrhea, nausea and vomiting.   Endocrine: Negative for cold intolerance, heat intolerance, polydipsia, polyphagia and polyuria.   Genitourinary: Negative for decreased urine volume, difficulty urinating, dysuria, flank pain, frequency and hematuria.   Musculoskeletal: Negative for arthralgias, joint swelling and myalgias.   Skin: Negative for rash.   Allergic/Immunologic: Negative for immunocompromised state.   Neurological: Positive for weakness. Negative for dizziness, syncope, light-headedness and headaches.   Hematological: Negative for adenopathy. Does not bruise/bleed easily.   Psychiatric/Behavioral: Negative for confusion and sleep disturbance. The patient is not nervous/anxious.         All pertinent negatives and positives are as above. All other systems have been reviewed and are negative unless otherwise stated.     Objective    Temp:  [98.5 °F (36.9 °C)-100.7 °F (38.2 °C)] 99.3 °F (37.4 °C)  Heart Rate:  [] 100  Resp:  [16-18] 16  BP: (92-98)/(40-50) 93/50    Intake/Output Summary  (Last 24 hours) at 10/3/2021 1603  Last data filed at 10/3/2021 1500  Gross per 24 hour   Intake 120 ml   Output 2350 ml   Net -2230 ml     Physical Exam  Vitals and nursing note reviewed.   Constitutional:       Appearance: She is well-developed.   HENT:      Head: Normocephalic.   Eyes:      Conjunctiva/sclera: Conjunctivae normal.      Pupils: Pupils are equal, round, and reactive to light.   Neck:      Vascular: No JVD.   Cardiovascular:      Rate and Rhythm: Normal rate and regular rhythm.      Heart sounds: Normal heart sounds. No murmur heard.   No friction rub. No gallop.    Pulmonary:      Effort: No respiratory distress.      Breath sounds: Rhonchi right lower lobe.     Comments: Chest tube to right chest.  Diminished breath sound bilateral, more diminished right lower lobe.  Chest:      Chest wall: No tenderness.   Abdominal:      General: Bowel sounds are normal. There is no distension.      Palpations: Abdomen is soft.      Tenderness: There is no abdominal tenderness. There is no guarding or rebound.   Musculoskeletal:         General: No tenderness or deformity. Normal range of motion.      Cervical back: Neck supple.   Skin:     General: Skin is warm and dry.      Capillary Refill: Capillary refill takes 2 to 3 seconds.      Findings: No rash.   Neurological:      Mental Status: She is alert and oriented to person, place, and time.      Cranial Nerves: No cranial nerve deficit.      Motor: Weakness present. No abnormal muscle tone.      Deep Tendon Reflexes: Reflexes normal.   Psychiatric:         Behavior: Behavior normal.         Thought Content: Thought content normal.   Results Review:  Lab Results (last 24 hours)     Procedure Component Value Units Date/Time    Basic Metabolic Panel [[id number]]  (Abnormal) Collected: 10/03/21 0417    Specimen: Blood Updated: 10/03/21 0516     Glucose 133 mg/dL      BUN 8 mg/dL      Creatinine 0.35 mg/dL      Sodium 135 mmol/L      Potassium 3.7 mmol/L       Chloride 101 mmol/L      CO2 22.0 mmol/L      Calcium 8.6 mg/dL      eGFR Non African Amer >150 mL/min/1.73      BUN/Creatinine Ratio 22.9     Anion Gap 12.0 mmol/L     Narrative:      GFR Normal >60  Chronic Kidney Disease <60  Kidney Failure <15      CBC (No Diff) [[id number]]  (Abnormal) Collected: 10/03/21 0417    Specimen: Blood Updated: 10/03/21 0454     WBC 6.31 10*3/mm3      RBC 3.70 10*6/mm3      Hemoglobin 9.7 g/dL      Hematocrit 30.6 %      MCV 82.7 fL      MCH 26.2 pg      MCHC 31.7 g/dL      RDW 15.7 %      RDW-SD 47.1 fl      MPV 10.1 fL      Platelets 269 10*3/mm3            Cultures:  No results found for: BLOODCX, URINECX, WOUNDCX, MRSACX, RESPCX, STOOLCX    Radiology Data:    Imaging Results (Last 24 Hours)     Procedure Component Value Units Date/Time    XR Chest 1 View [[id number]] Collected: 10/03/21 0917     Updated: 10/03/21 0922    Narrative:      EXAMINATION: XR CHEST 1 VW-. 10/3/2021 9:17 AM CDT     CHEST, ONE VIEW:     HISTORY: Pneumothorax     COMPARISON: 10/2/2021 and 10/1/2021     A single frontal chest radiograph was obtained.     FINDINGS:     Right-sided chest tube identified without pneumothorax.     Pleural-based masslike opacity in the right upper lobe and right lower  and middle lobe lobe airspace opacities persist likely unchanged.     Left lung is grossly clear.     The heart is normal in size without heart failure.                                     Impression:      1. Stable one-day appearance the chest.     This report was finalized on 10/03/2021 09:19 by Dr. Ricardo Bennett MD.          No Known Allergies    Scheduled meds:   budesonide-formoterol, 2 puff, Inhalation, BID - RT  docusate sodium, 100 mg, Oral, Daily  famotidine, 20 mg, Oral, BID  guaiFENesin, 1,200 mg, Oral, BID  ipratropium-albuterol, 3 mL, Nebulization, 4x Daily - RT  linezolid, 600 mg, Oral, Q12H        PRN meds:  •  acetaminophen  •  ondansetron  •  sodium chloride    Assessment/Plan       Pneumonia of  right lower lobe due to infectious organism    Acute respiratory failure with hypoxia (HCC)    Pneumothorax    Mucus plugging of bronchi      Plan:  Pneumothorax/Covid positive/hypoxia/right-sided cavitary pneumonia versus abscess.  Patient has not been vaccinated for Covid.  Infectious consult.  CT surgeon consult.  Pulmonary consulted.  Continue vancomycin.  Albuterol inhaler.  Symbicort.  Mucinex.  Flutter valve.  Incentive spirometer.  Patient completed dexamethasone.  Echocardiogram-ejection fraction 55%, borderline concentric hypertrophy, diastolic dysfunction is normal.   Status post bronchoscope with a mucous plug removed 9/24/2021.  Chest x-ray-No significant interval change, Dense consolidation in the right lower lobe appears stable, Right-sided chest tube is in stable position, Right-sided pneumothorax is essentially resolved.  CT of the chest-densely consolidated area within the right lung base is slightly smaller, small right pleural effusion is unchanged, Despite enlargement of 2 right upper lobe cysts or cavities with mildly increased internal fluid the overall appearance of the right upper lobe is that of improved expansion and partial clearing of parenchymal infiltrate.  Plan for bronchoscope by Dr. Isidro today 9/29/2021.  Right-sided chest tube in place.  Patient currently on room air.     Hypokalemia .  Resolved.  Magnesium level-stable.     Hypotension.  Asymptomatic.  Will follow.     Pain . Norco as needed.     Constipation.  Colace.     Lovenox prophylaxis.     Reflux.  Zofran as needed.  Pepcid.     Nutrition.  Regular diet.     Lavage culture-MRSA positive.  Fungal culture-no fungus less than a week.  Legionella-negative.  MRSA screening-positive. Strep pneumo-negative.  Covid-19-positive.  Mycoplasma pneumonia pending.  Blood culture-Staphylococcus hominis 1 out of 2 bottle.       Discharge Planning: Per CT surgeon.      Electronically signed by Tim Neil MD, 10/03/21, 4:03 PM  CDT.

## 2021-10-03 NOTE — PROGRESS NOTES
"Patient name: Lamar Rodriguez  Patient : 2000  VISIT # [id number]  MR #[id number]    Procedure: Right thoracostomy tube placement  Procedure Date: 2021  POD:12  Bronchoscopy #1  POD 9  Bronchoscopy #2  POD 3     Subjective   Chief Complaint   Patient presents with   • Chest Pain     Up in chair.  Tolerating oral antibiotics.  Chest physiotherapy continues to be performed.  Her cough is better with more force.  Sometimes clearing secretion.  IS 1500 ml. Right chest tube to waterseal. T max 99.3F overnight.     Telemetry: sinus  ROS: no sweats or chills       Objective     Visit Vitals  BP 93/50 (BP Location: Left arm, Patient Position: Lying)   Pulse 100   Temp 99.3 °F (37.4 °C) (Oral)   Resp 16   Ht 177.8 cm (70\")   Wt 65.1 kg (143 lb 8.3 oz)   LMP 2021 (Approximate)   SpO2 97%   Breastfeeding No   BMI 20.59 kg/m²   AF    Intake/Output Summary (Last 24 hours) at 10/3/2021 1417  Last data filed at 10/3/2021 0538  Gross per 24 hour   Intake --   Output 2450 ml   Net -2450 ml     R CT: No significant output ml/24 hours; no air leak, on waterseal, tidals    Lab:     CBC:  Results from last 7 days   Lab Units 10/03/21  0417 10/02/21  0431 10/01/21  0616   WBC 10*3/mm3 6.31 6.27 6.92   HEMATOCRIT % 30.6* 28.9* 29.9*   PLATELETS 10*3/mm3 269 266 309          BMP:  Results from last 7 days   Lab Units 10/03/21  0417 10/02/21  0431 10/01/21  0616   SODIUM mmol/L 135* 138 138   POTASSIUM mmol/L 3.7 3.4* 3.8   CHLORIDE mmol/L 101 104 104   CO2 mmol/L 22.0 25.0 26.0   GLUCOSE mg/dL 133* 103* 103*   BUN mg/dL 8 8 8   CREATININE mg/dL 0.35* 0.30* 0.34*          COAG:        Invalid input(s): PT  Blood Culture   Lab   Staphylococcus hominis ssp hominisCritical    [facility]      Isolated from Aerobic and Anaerobic Bottles                Gram Stain  Critical    Lab   Aerobic Bottle Gram positive cocci in clusters  [facility]      Aerobic Bottle Gram positive cocci in clusters [facility]            Susceptibility     " Staphylococcus hominis ssp hominis     [facility]     Oxacillin 2 ug/ml Resistant     Vancomycin <=0.5 ug/ml Susceptible         Culture results from last 30 days   Lab 09/24/21  1709   FUNGUSCX No fungus isolated at 1 week      BAL Culture   Lab   >100,000 CFU/mL Staphylococcus aureus, MRSAAbnormal    [facility]   Methicillin resistant Staphylococcus aureus, Patient may be an isolation risk.          Gram Stain   Lab   Greater than 25 WBCs per low power field [facility]      No Epithelial cells per low power field [facility]      Few (2+) Gram positive cocci [facility]                  Specimen Collected: 09/24/21 17:09 Last Resulted: 09/26/21 13:12            IMAGES:       Imaging Results (Last 24 Hours)     Procedure Component Value Units Date/Time    XR Chest 1 View [[id number]] Collected: 10/03/21 0917     Updated: 10/03/21 0922    Narrative:      EXAMINATION: XR CHEST 1 VW-. 10/3/2021 9:17 AM CDT     CHEST, ONE VIEW:     HISTORY: Pneumothorax     COMPARISON: 10/2/2021 and 10/1/2021     A single frontal chest radiograph was obtained.     FINDINGS:     Right-sided chest tube identified without pneumothorax.     Pleural-based masslike opacity in the right upper lobe and right lower  and middle lobe lobe airspace opacities persist likely unchanged.     Left lung is grossly clear.     The heart is normal in size without heart failure.                                     Impression:      1. Stable one-day appearance the chest.     This report was finalized on 10/03/2021 09:19 by Dr. Ricardo Bennett MD.          CXR: right chest tube in place, right basilar consolidation-continued mild improvement in aeration again.  The right upper lung field laterally appears to have a mass effect.  This likely is her known fluid filled pneumatocele.      Physical Exam:  General: No acute distress, in good spirits.Up in chair.    Cardiovascular: RRR, no murmur, rubs, or gallops.    Pulmonary: Clear to auscultation   Abdomen: Soft, non  distended, and non tender.  Extremities: Warm, moves all extremities.  Neurologic:  No focal deficits, CN II-XII intact grossly.    Chest: Right chest tube to waterseal.   Neurologic:  Grossly intact with no focal deficits.            Impression:  Acute respiratory failure with hypoxia (CMS/HCC), resolved   Pneumothorax, s/p chest tube placement  Pneumonia-MRSA  Right basilar consolidation persists but is improving  Recent COVID infection   Cavitary lung nodules, multiple   Pulmonary abscesses/pneumatocele   Lactic acidosis, resolved   Bacteremia  Mucus plugging causing right lobar collapse       Plan:  Right chest tube to waterseal  Continue to push pulmonary toilet and ambulation. OK for chest physiotherapy.   Continue supportive care   Antibiotics per ID   DW patient and nursing  May need repeat bronchoscopy but her pulmonary toilet is better.   Repeat CT scan of chest tomorrow.      Jarret Isidro MD  10/03/21  14:17 CDT       independent

## 2021-10-03 NOTE — PLAN OF CARE
Goal Outcome Evaluation:  Plan of Care Reviewed With: patient        Progress: improving  Outcome Summary: Pt has no c/o pain this shift. She has been up walking in the room and using her IS. She started her PO abx. Voiding. IID. CT to -20 cm waterseal.VSS. Safety maintained.

## 2021-10-03 NOTE — PLAN OF CARE
Goal Outcome Evaluation:      Pt has been up ambulating the halls several times today. Little shortness of breath when up ambulating the hallways but oxygen saturation at normal limits. Chest tube not putting out any drainage. One episode of nausea and a headache which zofran was giving and tylenol. No other complaints today.

## 2021-10-03 NOTE — NURSING NOTE
Pt's IV shown to have phlebitis and pain when flushing with normal saline. Removed IV. Asked Dr. Neil if okay to keep out because pt stated she's had 8 IVs this admission. Dr. Neil stated it was okay for IV to stay out. Will check with other MDs on treatment team as well.

## 2021-10-04 ENCOUNTER — APPOINTMENT (OUTPATIENT)
Dept: CT IMAGING | Facility: HOSPITAL | Age: 21
End: 2021-10-04

## 2021-10-04 ENCOUNTER — APPOINTMENT (OUTPATIENT)
Dept: GENERAL RADIOLOGY | Facility: HOSPITAL | Age: 21
End: 2021-10-04

## 2021-10-04 LAB
ANION GAP SERPL CALCULATED.3IONS-SCNC: 11 MMOL/L (ref 5–15)
BUN SERPL-MCNC: 9 MG/DL (ref 6–20)
BUN/CREAT SERPL: 22.5 (ref 7–25)
CALCIUM SPEC-SCNC: 8.8 MG/DL (ref 8.6–10.5)
CHLORIDE SERPL-SCNC: 101 MMOL/L (ref 98–107)
CO2 SERPL-SCNC: 24 MMOL/L (ref 22–29)
CREAT SERPL-MCNC: 0.4 MG/DL (ref 0.57–1)
DEPRECATED RDW RBC AUTO: 47.8 FL (ref 37–54)
ERYTHROCYTE [DISTWIDTH] IN BLOOD BY AUTOMATED COUNT: 15.7 % (ref 12.3–15.4)
GFR SERPL CREATININE-BSD FRML MDRD: >150 ML/MIN/1.73
GLUCOSE SERPL-MCNC: 113 MG/DL (ref 65–99)
HCT VFR BLD AUTO: 31.6 % (ref 34–46.6)
HGB BLD-MCNC: 10.2 G/DL (ref 12–15.9)
MCH RBC QN AUTO: 27.1 PG (ref 26.6–33)
MCHC RBC AUTO-ENTMCNC: 32.3 G/DL (ref 31.5–35.7)
MCV RBC AUTO: 84 FL (ref 79–97)
PLATELET # BLD AUTO: 254 10*3/MM3 (ref 140–450)
PMV BLD AUTO: 10.4 FL (ref 6–12)
POTASSIUM SERPL-SCNC: 4 MMOL/L (ref 3.5–5.2)
RBC # BLD AUTO: 3.76 10*6/MM3 (ref 3.77–5.28)
SODIUM SERPL-SCNC: 136 MMOL/L (ref 136–145)
WBC # BLD AUTO: 7.57 10*3/MM3 (ref 3.4–10.8)

## 2021-10-04 PROCEDURE — 94799 UNLISTED PULMONARY SVC/PX: CPT

## 2021-10-04 PROCEDURE — 71045 X-RAY EXAM CHEST 1 VIEW: CPT

## 2021-10-04 PROCEDURE — 99231 SBSQ HOSP IP/OBS SF/LOW 25: CPT | Performed by: NURSE PRACTITIONER

## 2021-10-04 PROCEDURE — 94669 MECHANICAL CHEST WALL OSCILL: CPT

## 2021-10-04 PROCEDURE — 87040 BLOOD CULTURE FOR BACTERIA: CPT | Performed by: INTERNAL MEDICINE

## 2021-10-04 PROCEDURE — 71260 CT THORAX DX C+: CPT

## 2021-10-04 PROCEDURE — 25010000002 IOPAMIDOL 61 % SOLUTION: Performed by: FAMILY MEDICINE

## 2021-10-04 PROCEDURE — 63710000001 ONDANSETRON ODT 4 MG TABLET DISPERSIBLE: Performed by: FAMILY MEDICINE

## 2021-10-04 PROCEDURE — 85027 COMPLETE CBC AUTOMATED: CPT | Performed by: NURSE PRACTITIONER

## 2021-10-04 PROCEDURE — 80048 BASIC METABOLIC PNL TOTAL CA: CPT | Performed by: NURSE PRACTITIONER

## 2021-10-04 RX ADMIN — ACETAMINOPHEN 650 MG: 325 TABLET, FILM COATED ORAL at 01:19

## 2021-10-04 RX ADMIN — IPRATROPIUM BROMIDE AND ALBUTEROL SULFATE 3 ML: 2.5; .5 SOLUTION RESPIRATORY (INHALATION) at 20:28

## 2021-10-04 RX ADMIN — BUDESONIDE AND FORMOTEROL FUMARATE DIHYDRATE 2 PUFF: 160; 4.5 AEROSOL RESPIRATORY (INHALATION) at 20:28

## 2021-10-04 RX ADMIN — IPRATROPIUM BROMIDE AND ALBUTEROL SULFATE 3 ML: 2.5; .5 SOLUTION RESPIRATORY (INHALATION) at 14:56

## 2021-10-04 RX ADMIN — ONDANSETRON 4 MG: 4 TABLET, ORALLY DISINTEGRATING ORAL at 18:06

## 2021-10-04 RX ADMIN — FAMOTIDINE 20 MG: 20 TABLET, FILM COATED ORAL at 20:15

## 2021-10-04 RX ADMIN — GUAIFENESIN 1200 MG: 600 TABLET, EXTENDED RELEASE ORAL at 08:54

## 2021-10-04 RX ADMIN — DOCUSATE SODIUM 100 MG: 100 CAPSULE ORAL at 08:54

## 2021-10-04 RX ADMIN — LINEZOLID 600 MG: 600 TABLET, FILM COATED ORAL at 20:15

## 2021-10-04 RX ADMIN — LINEZOLID 600 MG: 600 TABLET, FILM COATED ORAL at 08:54

## 2021-10-04 RX ADMIN — FAMOTIDINE 20 MG: 20 TABLET, FILM COATED ORAL at 08:54

## 2021-10-04 RX ADMIN — IPRATROPIUM BROMIDE AND ALBUTEROL SULFATE 3 ML: 2.5; .5 SOLUTION RESPIRATORY (INHALATION) at 11:15

## 2021-10-04 RX ADMIN — IOPAMIDOL 100 ML: 612 INJECTION, SOLUTION INTRAVENOUS at 13:10

## 2021-10-04 RX ADMIN — BUDESONIDE AND FORMOTEROL FUMARATE DIHYDRATE 2 PUFF: 160; 4.5 AEROSOL RESPIRATORY (INHALATION) at 06:36

## 2021-10-04 RX ADMIN — GUAIFENESIN 1200 MG: 600 TABLET, EXTENDED RELEASE ORAL at 20:15

## 2021-10-04 RX ADMIN — IPRATROPIUM BROMIDE AND ALBUTEROL SULFATE 3 ML: 2.5; .5 SOLUTION RESPIRATORY (INHALATION) at 06:36

## 2021-10-04 NOTE — PROGRESS NOTES
[facility] Medicine Services  INPATIENT PROGRESS NOTE    Patient Name: Lamar Rodriguez  Date of Admission: 9/21/2021  Today's Date: 10/04/21  Length of Stay: 13  Primary Care Physician: Provider, No Known    Subjective   Chief Complaint: SOA    Doing ok.  Afebrile.  No SOA, on room air  Chest tube is to waterseal.  CTS planning repeat Chest CT today.          Review of Systems   Constitutional: Positive for fatigue. Negative for fever.   HENT: Negative for congestion and ear pain.    Eyes: Negative for pain and visual disturbance.   Respiratory: Positive for cough and shortness of breath. Negative for wheezing.    Cardiovascular: Positive for chest pain. Negative for palpitations.   Gastrointestinal: Negative for diarrhea, nausea and vomiting.   Endocrine: Negative for heat intolerance.   Genitourinary: Negative for dysuria and frequency.   Musculoskeletal: Negative for arthralgias and back pain.   Skin: Negative for rash and wound.   Neurological: Negative for dizziness and light-headedness.   Psychiatric/Behavioral: Negative for confusion. The patient is not nervous/anxious.    All other systems reviewed and are negative.       All pertinent negatives and positives are as above. All other systems have been reviewed and are negative unless otherwise stated.     Objective    Temp:  [98.1 °F (36.7 °C)-102.1 °F (38.9 °C)] 98.1 °F (36.7 °C)  Heart Rate:  [] 87  Resp:  [16] 16  BP: (90-98)/(40-59) 91/47  Physical Exam  Constitutional:       Appearance: Normal appearance. She is well-developed.      Interventions: Nasal cannula in place.      Comments: Chest tube to waterseal   HENT:      Head: Normocephalic and atraumatic.      Right Ear: Tympanic membrane, ear canal and external ear normal.      Left Ear: Tympanic membrane, ear canal and external ear normal.      Nose: Nose normal.      Mouth/Throat:      Mouth: Mucous membranes are moist.      Pharynx: Oropharynx is clear.    Eyes:      Extraocular Movements: Extraocular movements intact.      Conjunctiva/sclera: Conjunctivae normal.      Pupils: Pupils are equal, round, and reactive to light.   Cardiovascular:      Rate and Rhythm: Normal rate and regular rhythm.      Heart sounds: Normal heart sounds.   Pulmonary:      Effort: Pulmonary effort is normal.      Breath sounds: Decreased breath sounds present.   Abdominal:      General: Bowel sounds are normal. There is no distension.      Palpations: Abdomen is soft.      Tenderness: There is no abdominal tenderness.   Musculoskeletal:         General: Normal range of motion.      Cervical back: Normal range of motion and neck supple.   Skin:     General: Skin is warm and dry.   Neurological:      Mental Status: She is alert and oriented to person, place, and time.   Psychiatric:         Mood and Affect: Mood normal.         Speech: Speech normal.         Behavior: Behavior normal.         Thought Content: Thought content normal.         Judgment: Judgment normal.             Results Review:  I have reviewed the labs, radiology results, and diagnostic studies.    Laboratory Data:   Results from last 7 days   Lab Units 10/04/21  0133 10/03/21  0417 10/02/21  0431   WBC 10*3/mm3 7.57 6.31 6.27   HEMOGLOBIN g/dL 10.2* 9.7* 9.0*   HEMATOCRIT % 31.6* 30.6* 28.9*   PLATELETS 10*3/mm3 254 269 266        Results from last 7 days   Lab Units 10/04/21  0133 10/03/21  0417 10/02/21  0431   SODIUM mmol/L 136 135* 138   POTASSIUM mmol/L 4.0 3.7 3.4*   CHLORIDE mmol/L 101 101 104   CO2 mmol/L 24.0 22.0 25.0   BUN mg/dL 9 8 8   CREATININE mg/dL 0.40* 0.35* 0.30*   CALCIUM mg/dL 8.8 8.6 8.5*   GLUCOSE mg/dL 113* 133* 103*       Culture Data:   Blood Culture   Date Value Ref Range Status   09/21/2021 No growth at 24 hours  Preliminary       Radiology Data:   Imaging Results (Last 24 Hours)     Procedure Component Value Units Date/Time    XR Chest 1 View [[id number]] Collected: 10/04/21 0737     Updated:  10/04/21 0741    Narrative:      Frontal supine radiograph of the chest 10/4/2021 3:40 AM CDT     History: pneumothorax; J96.01-Acute respiratory failure with hypoxia;  J93.9-Pneumothorax, unspecified; J18.9-Pneumonia, unspecified organism;  U07.1-COVID-19; J18.9-Pneumonia, unspecified organism;  T17.500A-Unspecified foreign body in bronchus causing asphyxiation,  initial encounter     Comparison: 10/3/2021      Findings:   Lines and tubes are stable in position. No new opacities or  pneumothoraces are visualized in the chest. The cardiomediastinal  silhouette and pulmonary vascularity are unchanged.       No acute osseous or soft tissue abnormality is noted.        Impression:      Impression:   1. No significant interval change since previous exam. No appreciable  residual pneumothorax.        This report was finalized on 10/04/2021 07:38 by Dr. Dayron Lobo MD.          I have reviewed the patient's current medications.       Labs reviewed:  Anemia    Imaging reviewed: CXR    Telemetry reviewed    Telemetry independently interpreted by me: NSR      Assessment/Plan     Active Hospital Problems    Diagnosis    • **Pneumonia of right lower lobe due to infectious organism      Added automatically from request for surgery [id number]     • Pneumothorax    • Mucus plugging of bronchi    • Acute respiratory failure with hypoxia (HCC)      1.  Pneumothorax  -s/p Chest tube  -CTS following     2.  COVID-19  -Decadron  -previously got Remdesivir while in the hospital  -Pulm following  -ID following     3.  Lactic acidosis  -continue to improve oxygenation     4.  Secondary bacterial pneumonia  -PO Abx  -Blood/sputum cultures  -ID following  -pulm following  -Strep, Legionella, Mycoplasma antigens     5.  Pulmonary abscess  -PO Abx  -pulm following  -ID following  -CTS following    6.  Strep Lugdunensis bacteremia  -PO abx per ID            Discharge Planning: I expect the patient to be discharged to home in 6-7  days    Electronically signed by Chepe Lewis MD, 10/04/21, 10:57 CDT.

## 2021-10-04 NOTE — PAYOR COMM NOTE
"10/4/21 [facility] [phone]  [phone]      UPDATE CLINICAL FOR CONT STAY.          Lamar Montiel (21 y.o. Female)     Date of Birth Social Security Number Address Home Phone MRN    2000  [address] [place] [place] [address] [phone] [id number]    Sikhism Marital Status          Baptism Single       Admission Date Admission Type Admitting Provider Attending Provider Department, Room/Bed    9/21/21 Emergency Chepe Lewis MD Moore, Jonathan Scott, MD [address], 362/1    Discharge Date Discharge Disposition Discharge Destination                       Attending Provider: Chepe Lewis MD    Allergies: No Known Allergies    Isolation: None   Infection: MRSA (09/21/21), COVID (History) (09/27/21)   Code Status: CPR    Ht: 177.8 cm (70\")   Wt: 65.1 kg (143 lb 8.3 oz)    Admission Cmt: None   Principal Problem: Pneumonia of right lower lobe due to infectious organism [J18.9] More...                 Active Insurance as of 9/21/2021     Primary Coverage     Payor Plan Insurance Group Employer/Plan Group    [employer] - ASA [id number]     Payor Plan Address Payor Plan Phone Number Payor Plan Fax Number Effective Dates    [address]   1/1/2016 - None Entered    [facility] [id number]       Subscriber Name Subscriber Birth Date Member ID       RENEE MONTIEL 4/18/1972 [id number]                 Emergency Contacts      (Rel.) Home Phone Work Phone Mobile Phone    Renee Montiel (Mother) [phone] -- --        [facility] Encounter Date/Time: 9/21/2021 13   Hospital Account: [id number]    MRN: [id number]   Patient:  Lamar Montiel   Contact Serial #: [id number]   SSN:          ENCOUNTER             Patient Class: Inpatient   Unit: [address] Service: Medicine     Bed: 362/1   Admitting Provider: Chepe Lewis MD   Referring Physician:     Attending Provider: Chepe Lewis MD   Adm " Diagnosis: Acute respiratory failur*               PATIENT          Name: Lamar Montiel : 2000 (21 yrs)   Address: [address] Sex: Female   City: [place] [address]   County: [facility]   Marital Status: SINGLE Ethnicity: NOT                                                                              Race: WHITE   Primary Care Provider: Provider, No Known Patients Phone: Home Phone: [phone]     Mobile Phone: [phone]   EMERGENCY CONTACT   Contact Name Legal Guardian? Relationship to Patient Home Phone Work Phone   1. Renee Montiel  2. *No Contact Specified* No    Mother    [phone]              GUARANTOR            Guarantor: Lamar Montiel     : 2000   Address: [address] Sex: [place]     [place] [address]     Relation to Patient: Self       Home Phone: [phone]   Guarantor ID: [id number]       Work Phone:     GUARANTOR EMPLOYER   Employer: [employer]         Status: FULL TIME   COVERAGE          PRIMARY INSURANCE   Payor: [employer] Plan: uiu - ASA   Group Number: [id number] Insurance Type: INDEMNITY   Subscriber Name: RENEE MONTIEL Subscriber : 1974   Subscriber ID: [id number] [place]age Address: [place] [address]   Pat. Rel. to Subscriber: Child Coverage Phone:     SECONDARY INSURANCE   Payor: N/A Plan: N/A   Group Number:   Insurance Type:     Subscriber Name:   Subscriber :     Subscriber ID:   Coverage Address:     Pat. Rel. to Subscriber:   Coverage Phone:        Contact Serial # ([id number])  `       2021    Chart ID ([id number])             Department Encounter #   2021  7:13 AM Bh Pad 3c [id number]   Cesar Norton MD   Physician   Infectious Disease   Progress Notes      Signed   Date of Service:  10/03/21 1928   Creation Time:  10/03/21 1928            Signed             Show:Clear all  [x]Manual[x]Template[x]Copied    Added by:  [x]Cesra Norton MD    []Beckie for details  Infectious Diseases Progress  "Note     Patient:  Lamar Rodriguez  YOB: 2000  MRN: [id number]       Admit date: 9/21/2021             Admitting Physician: Tim Neil MD  Primary Care Physician: Provider, No Known     Chief Complaint/Interval History: She is without new symptoms.  Tolerating oral antibiotic treatment.  She is trying to use incentive spirometry.  She indicates she did walk some today.      Intake/Output Summary (Last 24 hours) at 10/3/2021 1928  Last data filed at 10/3/2021 1500      Gross per 24 hour   Intake 120 ml   Output 2350 ml   Net -2230 ml      Allergies: No Known Allergies  Current Scheduled Medications:   budesonide-formoterol, 2 puff, Inhalation, BID - RT  docusate sodium, 100 mg, Oral, Daily  famotidine, 20 mg, Oral, BID  guaiFENesin, 1,200 mg, Oral, BID  ipratropium-albuterol, 3 mL, Nebulization, 4x Daily - RT  linezolid, 600 mg, Oral, Q12H        Current PRN Medications:  •  acetaminophen  •  ondansetron  •  ondansetron ODT  •  sodium chloride     Review of Systems see HPI     Vital Signs:  BP 92/59 (BP Location: Left arm, Patient Position: Lying)   Pulse 101   Temp 99 °F (37.2 °C) (Oral)   Resp 16   Ht 177.8 cm (70\")   Wt 65.1 kg (143 lb 8.3 oz)   LMP 09/22/2021 (Approximate)   SpO2 96%   Breastfeeding No   BMI 20.59 kg/m²      Physical Exam  Vital signs - reviewed.  Line/IV site - No erythema, warmth, induration, or tenderness.  Lungs decreased breath sounds right base  May be a little bit of air movement of superior aspect of right lower lung area     Lab Results:  CBC:             Results from last 7 days   Lab Units 10/03/21  0417 10/02/21  0431 10/01/21  0616 09/30/21  0620 09/29/21  0643 09/27/21  2355   WBC 10*3/mm3 6.31 6.27 6.92 7.86 7.38 10.74   HEMOGLOBIN g/dL 9.7* 9.0* 9.4* 9.5* 10.7* 10.5*   HEMATOCRIT % 30.6* 28.9* 29.9* 31.4* 34.6 31.6*   PLATELETS 10*3/mm3 269 266 309 373 368 425      BMP:                 Results from last 7 days   Lab Units 10/03/21  0417 10/02/21  0431 " 10/02/21  0431 10/01/21  0616 10/01/21  0616 21  0620 21  0620 21  0643 21  0643 21  2355 21  2355   SODIUM mmol/L 135*  --  138  --  138  --  142  --  135*  --  136   POTASSIUM mmol/L 3.7  --  3.4*  --  3.8  --  3.8  --  3.9  --  3.9   CHLORIDE mmol/L 101  --  104  --  104  --  105  --  101  --  102   CO2 mmol/L 22.0  --  25.0  --  26.0  --  29.0  --  28.0  --  26.0   BUN mg/dL 8  --  8  --  8  --  10  --  8  --  9   CREATININE mg/dL 0.35*  --  0.30*  --  0.34*  --  0.33*  --  0.32*  --  0.30*   GLUCOSE mg/dL 133*   < > 103*   < > 103*   < > 104*   < > 106*   < > 103*   CALCIUM mg/dL 8.6  --  8.5*  --  8.5*  --  8.6  --  8.6  --  8.2*    < > = values in this interval not displayed.      Radiology:   Chest x-ray today:    Additional Studies Reviewed: None     Impression:   Right middle and lower lobe pneumonia/mucous plugging/MRSA on BAL quantitative culture     Recommendations:   Continue linezolid  Continue attempts to maximize activity, chest physical therapy, incentive spirometry     Cesar Guevara MD             ED to Hosp-Admission (Current) on 2021   Department Encounter #   2021  7:13 AM Bh Pad 3c [id number]      Jarret Isidro MD   Physician   Cardiothoracic Surgery   Progress Notes      Signed   Date of Service:  10/03/21 1417   Creation Time:  10/03/21 1417            Signed        Expand AllCollapse All      Show:Clear all  [x]Manual[x]Template[x]Copied    Added by:  [x]Jarret Isidro MD    [][facility] for details  Patient name: Lamar Rodriguez  Patient : 2000  VISIT # [id number]  MR #[id number]     Procedure: Right thoracostomy tube placement  Procedure Date: 2021  POD:12  Bronchoscopy #1  POD 9  Bronchoscopy #2  POD 3         Subjective          Chief Complaint   Patient presents with   • Chest Pain      Up in chair.  Tolerating oral antibiotics.  Chest physiotherapy continues to be performed.  Her cough is better with more force.  Sometimes  "clearing secretion.  IS 1500 ml. Right chest tube to waterseal. T max 99.3F overnight.      Telemetry: sinus  ROS: no sweats or chills              Objective         Visit Vitals  BP 93/50 (BP Location: Left arm, Patient Position: Lying)   Pulse 100   Temp 99.3 °F (37.4 °C) (Oral)   Resp 16   Ht 177.8 cm (70\")   Wt 65.1 kg (143 lb 8.3 oz)   LMP 09/22/2021 (Approximate)   SpO2 97%   Breastfeeding No   BMI 20.59 kg/m²   AF     Intake/Output Summary (Last 24 hours) at 10/3/2021 1417  Last data filed at 10/3/2021 0538      Gross per 24 hour   Intake --   Output 2450 ml   Net -2450 ml      R CT: No significant output ml/24 hours; no air leak, on waterseal, tidals     Lab:     CBC:         Results from last 7 days   Lab Units 10/03/21  0417 10/02/21  0431 10/01/21  0616   WBC 10*3/mm3 6.31 6.27 6.92   HEMATOCRIT % 30.6* 28.9* 29.9*   PLATELETS 10*3/mm3 269 266 309            BMP:         Results from last 7 days   Lab Units 10/03/21  0417 10/02/21  0431 10/01/21  0616   SODIUM mmol/L 135* 138 138   POTASSIUM mmol/L 3.7 3.4* 3.8   CHLORIDE mmol/L 101 104 104   CO2 mmol/L 22.0 25.0 26.0   GLUCOSE mg/dL 133* 103* 103*   BUN mg/dL 8 8 8   CREATININE mg/dL 0.35* 0.30* 0.34*            COAG:         Invalid input(s): PT         Blood Culture     Lab   Staphylococcus hominis ssp hominisCritical    [facility]       Isolated from Aerobic and Anaerobic Bottles                       Gram Stain  Critical     Lab   Aerobic Bottle Gram positive cocci in clusters  [facility]       Aerobic Bottle Gram positive cocci in clusters  [facility]              Susceptibility              Staphylococcus hominis ssp hominis       DORA       Oxacillin 2 ug/ml Resistant       Vancomycin <=0.5 ug/ml Susceptible                 Culture results from last 30 days   Lab 09/24/21  1709   FUNGUSCX No fungus isolated at 1 week            BAL Culture     Lab   >100,000 CFU/mL Staphylococcus aureus, MRSAAbnormal    [facility]   Methicillin resistant Staphylococcus " aureus, Patient may be an isolation risk.            Gram Stain    Lab   Greater than 25 WBCs per low power field  [facility]       No Epithelial cells per low power field [facility]       Few (2+) Gram positive cocci  [facility]                     Specimen Collected: 09/24/21 17:09 Last Resulted: 09/26/21 13:12               IMAGES:                Imaging Results (Last 24 Hours)      Procedure Component Value Units Date/Time     XR Chest 1 View [[id number]] Collected: 10/03/21 0917       Updated: 10/03/21 0922     Narrative:       EXAMINATION: XR CHEST 1 VW-. 10/3/2021 9:17 AM CDT     CHEST, ONE VIEW:     HISTORY: Pneumothorax     COMPARISON: 10/2/2021 and 10/1/2021     A single frontal chest radiograph was obtained.     FINDINGS:     Right-sided chest tube identified without pneumothorax.     Pleural-based masslike opacity in the right upper lobe and right lower  and middle lobe lobe airspace opacities persist likely unchanged.     Left lung is grossly clear.     The heart is normal in size without heart failure.                                      Impression:       1. Stable one-day appearance the chest.     This report was finalized on 10/03/2021 09:19 by Dr. Ricardo Bennett MD.             CXR: right chest tube in place, right basilar consolidation-continued mild improvement in aeration again.  The right upper lung field laterally appears to have a mass effect.  This likely is her known fluid filled pneumatocele.        Physical Exam:  General: No acute distress, in good spirits.Up in chair.    Cardiovascular: RRR, no murmur, rubs, or gallops.    Pulmonary: Clear to auscultation   Abdomen: Soft, non distended, and non tender.  Extremities: Warm, moves all extremities.  Neurologic:  No focal deficits, CN II-XII intact grossly.    Chest: Right chest tube to waterseal.   Neurologic:  Grossly intact with no focal deficits.                 Impression:  Acute respiratory failure with hypoxia (CMS/HCC), resolved  "  Pneumothorax, s/p chest tube placement  Pneumonia-MRSA  Right basilar consolidation persists but is improving  Recent COVID infection   Cavitary lung nodules, multiple   Pulmonary abscesses/pneumatocele   Lactic acidosis, resolved   Bacteremia  Mucus plugging causing right lobar collapse         Plan:  Right chest tube to waterseal  Continue to push pulmonary toilet and ambulation. OK for chest physiotherapy.   Continue supportive care   Antibiotics per ID   DW patient and nursing  May need repeat bronchoscopy but her pulmonary toilet is better.   Repeat CT scan of chest tomorrow.        Jarret Isidro MD  10/03/21  14:17 CDT                   ED to Hosp-Admission (Current) on 2021     ED to Hosp-Admission (Current) on 2021  7:13 AM Bh Pad 3c [id number]   Tammi Medina APRN   Nurse Practitioner   Cardiothoracic Surgery   Progress Notes       Cosign Needed   Date of Service:  10/04/21 0826   Creation Time:  10/04/21 0918            Cosign Needed        Expand AllCollapse All      Show:Clear all  [x]Manual[x]Template[x]Copied    Added by:  [x]Tammi Medina APRN    []Beckie for details  Patient name: Lamar Rodriguez  Patient : 2000  VISIT # [id number]  MR #[id number]     Procedure: Right thoracostomy tube placement  Procedure Date: 2021  POD:12  Bronchoscopy #1  POD 9  Bronchoscopy #2  POD 3         Subjective          Chief Complaint   Patient presents with   • Chest Pain      Resting in bed on room air.  Tolerating oral antibiotics.   Her cough is better with more force.  She states she feels much better.  Clearing secretions better.  IS 1750 ml. Right chest tube to waterseal. T max 102F overnight.      Telemetry: sinus  ROS: no sweats or chills              Objective         Visit Vitals  BP 91/47 (BP Location: Left arm, Patient Position: Lying)   Pulse 87   Temp 98.1 °F (36.7 °C) (Oral)   Resp 16   Ht 177.8 cm (70\")   Wt 65.1 kg (143 lb 8.3 oz)   LMP 2021 " (Approximate)   SpO2 96%   Breastfeeding No   BMI 20.59 kg/m²   AF     Intake/Output Summary (Last 24 hours) at 10/4/2021 0918  Last data filed at 10/4/2021 0720      Gross per 24 hour   Intake 0 ml   Output 1900 ml   Net -1900 ml      R CT: No significant output ml/24 hours; no air leak, on waterseal, tidals     Lab:     CBC:         Results from last 7 days   Lab Units 10/04/21  0133 10/03/21  0417 10/02/21  0431   WBC 10*3/mm3 7.57 6.31 6.27   HEMATOCRIT % 31.6* 30.6* 28.9*   PLATELETS 10*3/mm3 254 269 266            BMP:         Results from last 7 days   Lab Units 10/04/21  0133 10/03/21  0417 10/02/21  0431   SODIUM mmol/L 136 135* 138   POTASSIUM mmol/L 4.0 3.7 3.4*   CHLORIDE mmol/L 101 101 104   CO2 mmol/L 24.0 22.0 25.0   GLUCOSE mg/dL 113* 133* 103*   BUN mg/dL 9 8 8   CREATININE mg/dL 0.40* 0.35* 0.30*            COAG:         Invalid input(s): PT         Blood Culture     Lab   Staphylococcus hominis ssp hominisCritical   [facility]       Isolated from Aerobic and Anaerobic Bottles                       Gram Stain  Critical     Lab   Aerobic Bottle Gram positive cocci in clusters [facility]       Aerobic Bottle Gram positive cocci in clusters [facility]              Susceptibility              Staphylococcus hominis ssp hominis       DORA       Oxacillin 2 ug/ml Resistant       Vancomycin <=0.5 ug/ml Susceptible                 Culture results from last 30 days   Lab 09/24/21  1709   FUNGUSCX No fungus isolated at 1 week            BAL Culture     Lab   >100,000 CFU/mL Staphylococcus aureus, MRSAAbnormal    [facility]   Methicillin resistant Staphylococcus aureus, Patient may be an isolation risk.            Gram Stain    Lab   Greater than 25 WBCs per low power field [facility]       No Epithelial cells per low power field [facility]       Few (2+) Gram positive cocci [facility]                     Specimen Collected: 09/24/21 17:09 Last Resulted: 09/26/21 13:12               IMAGES:                 Imaging Results (Last 24 Hours)      Procedure Component Value Units Date/Time     XR Chest 1 View [[id number]] Collected: 10/04/21 0737       Updated: 10/04/21 0741     Narrative:       Frontal supine radiograph of the chest 10/4/2021 3:40 AM CDT     History: pneumothorax; J96.01-Acute respiratory failure with hypoxia;  J93.9-Pneumothorax, unspecified; J18.9-Pneumonia, unspecified organism;  U07.1-COVID-19; J18.9-Pneumonia, unspecified organism;  T17.500A-Unspecified foreign body in bronchus causing asphyxiation,  initial encounter     Comparison: 10/3/2021      Findings:   Lines and tubes are stable in position. No new opacities or  pneumothoraces are visualized in the chest. The cardiomediastinal  silhouette and pulmonary vascularity are unchanged.       No acute osseous or soft tissue abnormality is noted.         Impression:       Impression:   1. No significant interval change since previous exam. No appreciable  residual pneumothorax.        This report was finalized on 10/04/2021 07:38 by Dr. Dayron Lobo MD.             CXR: right chest tube in place, right basilar consolidation-continued mild improvement in aeration again.  The right upper lung field laterally appears to have a mass effect.  This likely is her known fluid filled pneumatocele.        Physical Exam:  General: No acute distress, in good spirits.   Cardiovascular: RRR, no murmur, rubs, or gallops.    Pulmonary: Clear to auscultation   Abdomen: Soft, non distended, and non tender.  Extremities: Warm, moves all extremities.  Neurologic:  No focal deficits, CN II-XII intact grossly.    Chest: Right chest tube to waterseal.   Neurologic:  Grossly intact with no focal deficits.                 Impression:  Acute respiratory failure with hypoxia (CMS/HCC), resolved   Pneumothorax, s/p chest tube placement  Pneumonia-MRSA  Right basilar consolidation persists but is improving  Recent COVID infection   Cavitary lung nodules, multiple   Pulmonary  abscesses/pneumatocele   Lactic acidosis, resolved   Bacteremia  Mucus plugging causing right lobar collapse         Plan:  Right chest tube to waterseal  Continue to push pulmonary toilet and ambulation. OK for chest physiotherapy.   Continue supportive care   Antibiotics per ID   Plan for repeat CT chest today  DW patient        ZACH Hoff  10/04/21  09:18 CDT                   ED to Hosp-Admission (Current) on 9/21/2021      Vital Signs (last 2 days)    Date/Time  Temp  Pulse  Resp  BP  Device (Oxygen Therapy)  SpO2   10/04/21 0720  98.1 (36.7)  87  16  91/47  room air  96   10/04/21 0641  --  89  16  --  room air  97   10/04/21 0636  --  86  16  --  room air  96   10/04/21 0509  98.3 (36.8)  76  16  98/49  room air  95   10/04/21 0216  99.5 (37.5)  --  --  --  --  --   10/04/21 0054  102.1 (38.9)Abnormal    102  16  90/40  room air  97   Temp:                    Current Facility-Administered Medications   Medication Dose Route Frequency Provider Last Rate Last Admin   • acetaminophen (TYLENOL) tablet 650 mg  650 mg Oral Q6H PRN Tammi Loomis APRN   650 mg at 10/04/21 0119   • budesonide-formoterol (SYMBICORT) 160-4.5 MCG/ACT inhaler 2 puff  2 puff Inhalation BID - RT Tammi Loomis APRN   2 puff at 10/04/21 0636   • docusate sodium (COLACE) capsule 100 mg  100 mg Oral Daily Tammi Loomis APRN   100 mg at 10/04/21 0854   • famotidine (PEPCID) tablet 20 mg  20 mg Oral BID Tammi Loomis APRN   20 mg at 10/04/21 0854   • guaiFENesin (MUCINEX) 12 hr tablet 1,200 mg  1,200 mg Oral BID Tammi Loomis APRN   1,200 mg at 10/04/21 0854   • ipratropium-albuterol (DUO-NEB) nebulizer solution 3 mL  3 mL Nebulization 4x Daily - RT Tammi Loomis APRN   3 mL at 10/04/21 0636   • linezolid (ZYVOX) tablet 600 mg  600 mg Oral Q12H Cesar Norton MD   600 mg at 10/04/21 0854   • ondansetron (ZOFRAN) injection 4 mg  4 mg Intravenous Q6H PRN Tammi Loomis APRN   4 mg at 09/24/21  0946   • ondansetron ODT (ZOFRAN-ODT) disintegrating tablet 4 mg  4 mg Oral Q6H PRN Tim Neil MD   4 mg at 10/03/21 1630   • sodium chloride 0.9 % flush 10 mL  10 mL Intravenous PRN Tammi Loomis APRN   10 mL at 09/29/21 1426

## 2021-10-04 NOTE — PLAN OF CARE
Goal Outcome Evaluation:           Progress: improving  Outcome Summary: Pt denies pain.  Ambulating several times this shift.  CT done.  MD discussed results with patient and she reports feeling hopeful that she will discharge in a couple of days.  VSS. Zofran given for nausea. Will continue to monitor.

## 2021-10-04 NOTE — PROGRESS NOTES
"Infectious Diseases Progress Note    Patient:  Lamar Rodriguez  YOB: 2000  MRN: [id number]   Admit date: 9/21/2021   Admitting Physician: Tim Neil MD  Primary Care Physician: Provider, No Known    Chief Complaint/Interval History: She is without new symptoms.  Tolerating oral antibiotic treatment.  She is trying to use incentive spirometry.  She indicates she did walk some today.     Intake/Output Summary (Last 24 hours) at 10/3/2021 1928  Last data filed at 10/3/2021 1500  Gross per 24 hour   Intake 120 ml   Output 2350 ml   Net -2230 ml     Allergies: No Known Allergies  Current Scheduled Medications:   budesonide-formoterol, 2 puff, Inhalation, BID - RT  docusate sodium, 100 mg, Oral, Daily  famotidine, 20 mg, Oral, BID  guaiFENesin, 1,200 mg, Oral, BID  ipratropium-albuterol, 3 mL, Nebulization, 4x Daily - RT  linezolid, 600 mg, Oral, Q12H      Current PRN Medications:  •  acetaminophen  •  ondansetron  •  ondansetron ODT  •  sodium chloride    Review of Systems see HPI    Vital Signs:  BP 92/59 (BP Location: Left arm, Patient Position: Lying)   Pulse 101   Temp 99 °F (37.2 °C) (Oral)   Resp 16   Ht 177.8 cm (70\")   Wt 65.1 kg (143 lb 8.3 oz)   LMP 09/22/2021 (Approximate)   SpO2 96%   Breastfeeding No   BMI 20.59 kg/m²     Physical Exam  Vital signs - reviewed.  Line/IV site - No erythema, warmth, induration, or tenderness.  Lungs decreased breath sounds right base  May be a little bit of air movement of superior aspect of right lower lung area    Lab Results:  CBC:   Results from last 7 days   Lab Units 10/03/21  0417 10/02/21  0431 10/01/21  0616 09/30/21  0620 09/29/21  0643 09/27/21  2355   WBC 10*3/mm3 6.31 6.27 6.92 7.86 7.38 10.74   HEMOGLOBIN g/dL 9.7* 9.0* 9.4* 9.5* 10.7* 10.5*   HEMATOCRIT % 30.6* 28.9* 29.9* 31.4* 34.6 31.6*   PLATELETS 10*3/mm3 269 266 309 373 368 425     BMP:  Results from last 7 days   Lab Units 10/03/21  0417 10/02/21  0431 10/02/21  0431 10/01/21  0616 " 10/01/21  0616 09/30/21  0620 09/30/21  0620 09/29/21  0643 09/29/21 0643 09/27/21  2355 09/27/21  2355   SODIUM mmol/L 135*  --  138  --  138  --  142  --  135*  --  136   POTASSIUM mmol/L 3.7  --  3.4*  --  3.8  --  3.8  --  3.9  --  3.9   CHLORIDE mmol/L 101  --  104  --  104  --  105  --  101  --  102   CO2 mmol/L 22.0  --  25.0  --  26.0  --  29.0  --  28.0  --  26.0   BUN mg/dL 8  --  8  --  8  --  10  --  8  --  9   CREATININE mg/dL 0.35*  --  0.30*  --  0.34*  --  0.33*  --  0.32*  --  0.30*   GLUCOSE mg/dL 133*   < > 103*   < > 103*   < > 104*   < > 106*   < > 103*   CALCIUM mg/dL 8.6  --  8.5*  --  8.5*  --  8.6  --  8.6  --  8.2*    < > = values in this interval not displayed.     Radiology:   Chest x-ray today:    Additional Studies Reviewed: None    Impression:   Right middle and lower lobe pneumonia/mucous plugging/MRSA on BAL quantitative culture    Recommendations:   Continue linezolid  Continue attempts to maximize activity, chest physical therapy, incentive spirometry    Cesar Guevara MD

## 2021-10-04 NOTE — CASE MANAGEMENT/SOCIAL WORK
Continued Stay Note  JOSE Trejo     Patient Name: Lamar Rodriguez  MRN: [id number]  Today's Date: 10/4/2021    Admit Date: 9/21/2021    Discharge Plan     Row Name 10/04/21 1352       Plan    Plan  Home    Patient/Family in Agreement with Plan  yes    Plan Comments  Pt still has a chest tube. She is currently on room air. Will follow in case d/c needs arise.        Discharge Codes    No documentation.             RICK Paulino

## 2021-10-04 NOTE — PROGRESS NOTES
"Patient name: Lamar Rodriguez  Patient : 2000  VISIT # [id number]  MR #[id number]    Procedure: Right thoracostomy tube placement  Procedure Date: 2021  POD:12  Bronchoscopy #1  POD 9  Bronchoscopy #2  POD 3     Subjective   Chief Complaint   Patient presents with   • Chest Pain     Resting in bed on room air.  Tolerating oral antibiotics.   Her cough is better with more force.  She states she feels much better.  Clearing secretions better.  IS 1750 ml. Right chest tube to waterseal. T max 102F overnight.     Telemetry: sinus  ROS: no sweats or chills       Objective     Visit Vitals  BP 91/47 (BP Location: Left arm, Patient Position: Lying)   Pulse 87   Temp 98.1 °F (36.7 °C) (Oral)   Resp 16   Ht 177.8 cm (70\")   Wt 65.1 kg (143 lb 8.3 oz)   LMP 2021 (Approximate)   SpO2 96%   Breastfeeding No   BMI 20.59 kg/m²   AF    Intake/Output Summary (Last 24 hours) at 10/4/2021 0918  Last data filed at 10/4/2021 0720  Gross per 24 hour   Intake 0 ml   Output 1900 ml   Net -1900 ml     R CT: No significant output ml/24 hours; no air leak, on waterseal, tidals    Lab:     CBC:  Results from last 7 days   Lab Units 10/04/21  0133 10/03/21  0417 10/02/21  0431   WBC 10*3/mm3 7.57 6.31 6.27   HEMATOCRIT % 31.6* 30.6* 28.9*   PLATELETS 10*3/mm3 254 269 266          BMP:  Results from last 7 days   Lab Units 10/04/21  0133 10/03/21  0417 10/02/21  0431   SODIUM mmol/L 136 135* 138   POTASSIUM mmol/L 4.0 3.7 3.4*   CHLORIDE mmol/L 101 101 104   CO2 mmol/L 24.0 22.0 25.0   GLUCOSE mg/dL 113* 133* 103*   BUN mg/dL 9 8 8   CREATININE mg/dL 0.40* 0.35* 0.30*          COAG:        Invalid input(s): PT  Blood Culture   Lab   Staphylococcus hominis ssp hominisCritical    [facility]      Isolated from Aerobic and Anaerobic Bottles                Gram Stain  Critical    Lab   Aerobic Bottle Gram positive cocci in clusters  [facility]      Aerobic Bottle Gram positive cocci in clusters [facility]            Susceptibility     " Staphylococcus hominis ssp hominis     [facility]     Oxacillin 2 ug/ml Resistant     Vancomycin <=0.5 ug/ml Susceptible         Culture results from last 30 days   Lab 09/24/21  1709   FUNGUSCX No fungus isolated at 1 week      BAL Culture   Lab   >100,000 CFU/mL Staphylococcus aureus, MRSAAbnormal    [facility]   Methicillin resistant Staphylococcus aureus, Patient may be an isolation risk.          Gram Stain   Lab   Greater than 25 WBCs per low power field [facility]      No Epithelial cells per low power field [facility]      Few (2+) Gram positive cocci [facility]                  Specimen Collected: 09/24/21 17:09 Last Resulted: 09/26/21 13:12            IMAGES:       Imaging Results (Last 24 Hours)     Procedure Component Value Units Date/Time    XR Chest 1 View [[id number]] Collected: 10/04/21 0737     Updated: 10/04/21 0741    Narrative:      Frontal supine radiograph of the chest 10/4/2021 3:40 AM CDT     History: pneumothorax; J96.01-Acute respiratory failure with hypoxia;  J93.9-Pneumothorax, unspecified; J18.9-Pneumonia, unspecified organism;  U07.1-COVID-19; J18.9-Pneumonia, unspecified organism;  T17.500A-Unspecified foreign body in bronchus causing asphyxiation,  initial encounter     Comparison: 10/3/2021      Findings:   Lines and tubes are stable in position. No new opacities or  pneumothoraces are visualized in the chest. The cardiomediastinal  silhouette and pulmonary vascularity are unchanged.       No acute osseous or soft tissue abnormality is noted.        Impression:      Impression:   1. No significant interval change since previous exam. No appreciable  residual pneumothorax.        This report was finalized on 10/04/2021 07:38 by Dr. Dayron Lobo MD.          CXR: right chest tube in place, right basilar consolidation-continued mild improvement in aeration again.  The right upper lung field laterally appears to have a mass effect.  This likely is her known fluid filled  pneumatocele.      Physical Exam:  General: No acute distress, in good spirits.   Cardiovascular: RRR, no murmur, rubs, or gallops.    Pulmonary: Clear to auscultation   Abdomen: Soft, non distended, and non tender.  Extremities: Warm, moves all extremities.  Neurologic:  No focal deficits, CN II-XII intact grossly.    Chest: Right chest tube to waterseal.   Neurologic:  Grossly intact with no focal deficits.            Impression:  Acute respiratory failure with hypoxia (CMS/HCC), resolved   Pneumothorax, s/p chest tube placement  Pneumonia-MRSA  Right basilar consolidation persists but is improving  Recent COVID infection   Cavitary lung nodules, multiple   Pulmonary abscesses/pneumatocele   Lactic acidosis, resolved   Bacteremia  Mucus plugging causing right lobar collapse       Plan:  Right chest tube to waterseal  Continue to push pulmonary toilet and ambulation. OK for chest physiotherapy.   Continue supportive care   Antibiotics per ID   Plan for repeat CT chest today  DW patient      Tammi Adam, APRN  10/04/21  09:18 CDT

## 2021-10-04 NOTE — PROGRESS NOTES
"Infectious Diseases Progress Note    Patient:  Lamar Rodriguez  YOB: 2000  MRN: [id number]   Admit date: 9/21/2021   Admitting Physician: Chepe Lewis MD  Primary Care Physician: Provider, No Known    Chief Complaint/Interval History: She had fever overnight.  Resolved currently.  She is up to chair.  No new symptoms.  Hemodynamically stable.  Tolerating oral antibiotic treatment.    Intake/Output Summary (Last 24 hours) at 10/4/2021 1030  Last data filed at 10/4/2021 0910  Gross per 24 hour   Intake 600 ml   Output 2300 ml   Net -1700 ml     Allergies: No Known Allergies  Current Scheduled Medications:   budesonide-formoterol, 2 puff, Inhalation, BID - RT  docusate sodium, 100 mg, Oral, Daily  famotidine, 20 mg, Oral, BID  guaiFENesin, 1,200 mg, Oral, BID  ipratropium-albuterol, 3 mL, Nebulization, 4x Daily - RT  linezolid, 600 mg, Oral, Q12H      Current PRN Medications:  •  acetaminophen  •  ondansetron  •  ondansetron ODT  •  sodium chloride    Review of Systems no nausea or diarrhea    Vital Signs:  BP 91/47 (BP Location: Left arm, Patient Position: Lying)   Pulse 87   Temp 98.1 °F (36.7 °C) (Oral)   Resp 16   Ht 177.8 cm (70\")   Wt 65.1 kg (143 lb 8.3 oz)   LMP 09/22/2021 (Approximate)   SpO2 96%   Breastfeeding No   BMI 20.59 kg/m²     Physical Exam  Vital signs - reviewed.  Line/IV site - No erythema, warmth, induration, or tenderness.  There was a little bit of air movement in the upper portion of her right lower lobe.  No real crackles.  Decreased breath sounds remain in right base.  Skin without drug rash  Abdomen soft and nontender    Lab Results:  CBC:   Results from last 7 days   Lab Units 10/04/21  0133 10/03/21  0417 10/02/21  0431 10/01/21  0616 09/30/21  0620 09/29/21  0643 09/27/21  2355   WBC 10*3/mm3 7.57 6.31 6.27 6.92 7.86 7.38 10.74   HEMOGLOBIN g/dL 10.2* 9.7* 9.0* 9.4* 9.5* 10.7* 10.5*   HEMATOCRIT % 31.6* 30.6* 28.9* 29.9* 31.4* 34.6 31.6*   PLATELETS 10*3/mm3 " 254 269 266 309 373 368 425     BMP:  Results from last 7 days   Lab Units 10/04/21  0133 10/03/21  0417 10/03/21  0417 10/02/21  0431 10/02/21  0431 10/01/21  0616 10/01/21  0616 09/30/21  0620 09/30/21  0620 09/29/21  0643 09/29/21  0643 09/27/21  2355 09/27/21  2355   SODIUM mmol/L 136  --  135*  --  138  --  138  --  142  --  135*  --  136   POTASSIUM mmol/L 4.0  --  3.7  --  3.4*  --  3.8  --  3.8  --  3.9  --  3.9   CHLORIDE mmol/L 101  --  101  --  104  --  104  --  105  --  101  --  102   CO2 mmol/L 24.0  --  22.0  --  25.0  --  26.0  --  29.0  --  28.0  --  26.0   BUN mg/dL 9  --  8  --  8  --  8  --  10  --  8  --  9   CREATININE mg/dL 0.40*  --  0.35*  --  0.30*  --  0.34*  --  0.33*  --  0.32*  --  0.30*   GLUCOSE mg/dL 113*   < > 133*   < > 103*   < > 103*   < > 104*   < > 106*   < > 103*   CALCIUM mg/dL 8.8  --  8.6  --  8.5*  --  8.5*  --  8.6  --  8.6  --  8.2*    < > = values in this interval not displayed.     Culture Results: Blood cultures x2 pending    Radiology:       Additional Studies Reviewed: None    Impression:   Right middle and right lower lobe pneumonia/mucous plugging-continues to have evidence of right lower lobe collapse.  Previously grown MRSA on BAL quantitative culture.  Should be on appropriate antibiotic therapy.    Recommendations:   Continue linezolid  Continue to encourage chest physical therapy  Per discussions with thoracic surgery, risk-benefit remains in favor of utilizing vibrating vest to help facilitate airway clearance (I would agree)  She is getting follow-up CT scan today  No change in antibiotic treatment at present    Cesar Guevara MD

## 2021-10-04 NOTE — PLAN OF CARE
Goal Outcome Evaluation:  Plan of Care Reviewed With: patient        Progress: improving  Outcome Summary: Pt does not c/o pain. ambulated in the null before bed. Using incentive, making it to 1500. Spiked a temp of 102.1. ID has been notified and new blood culture were drawn. CT to -20 cm waterseal. Up adlib. Voiding. VSS. Safety maintained.

## 2021-10-05 ENCOUNTER — APPOINTMENT (OUTPATIENT)
Dept: GENERAL RADIOLOGY | Facility: HOSPITAL | Age: 21
End: 2021-10-05

## 2021-10-05 LAB
ANION GAP SERPL CALCULATED.3IONS-SCNC: 11 MMOL/L (ref 5–15)
BUN SERPL-MCNC: 7 MG/DL (ref 6–20)
BUN/CREAT SERPL: 18.4 (ref 7–25)
CALCIUM SPEC-SCNC: 8.7 MG/DL (ref 8.6–10.5)
CHLORIDE SERPL-SCNC: 101 MMOL/L (ref 98–107)
CO2 SERPL-SCNC: 25 MMOL/L (ref 22–29)
CREAT SERPL-MCNC: 0.38 MG/DL (ref 0.57–1)
DEPRECATED RDW RBC AUTO: 47.4 FL (ref 37–54)
ERYTHROCYTE [DISTWIDTH] IN BLOOD BY AUTOMATED COUNT: 15.5 % (ref 12.3–15.4)
GFR SERPL CREATININE-BSD FRML MDRD: >150 ML/MIN/1.73
GLUCOSE SERPL-MCNC: 99 MG/DL (ref 65–99)
HCT VFR BLD AUTO: 29.7 % (ref 34–46.6)
HGB BLD-MCNC: 9.7 G/DL (ref 12–15.9)
MCH RBC QN AUTO: 27.2 PG (ref 26.6–33)
MCHC RBC AUTO-ENTMCNC: 32.7 G/DL (ref 31.5–35.7)
MCV RBC AUTO: 83.4 FL (ref 79–97)
PLATELET # BLD AUTO: 286 10*3/MM3 (ref 140–450)
PMV BLD AUTO: 9.9 FL (ref 6–12)
POTASSIUM SERPL-SCNC: 4.2 MMOL/L (ref 3.5–5.2)
RBC # BLD AUTO: 3.56 10*6/MM3 (ref 3.77–5.28)
SODIUM SERPL-SCNC: 137 MMOL/L (ref 136–145)
WBC # BLD AUTO: 8.17 10*3/MM3 (ref 3.4–10.8)

## 2021-10-05 PROCEDURE — 94799 UNLISTED PULMONARY SVC/PX: CPT

## 2021-10-05 PROCEDURE — 80048 BASIC METABOLIC PNL TOTAL CA: CPT | Performed by: NURSE PRACTITIONER

## 2021-10-05 PROCEDURE — 94669 MECHANICAL CHEST WALL OSCILL: CPT

## 2021-10-05 PROCEDURE — 85027 COMPLETE CBC AUTOMATED: CPT | Performed by: NURSE PRACTITIONER

## 2021-10-05 PROCEDURE — 99231 SBSQ HOSP IP/OBS SF/LOW 25: CPT | Performed by: NURSE PRACTITIONER

## 2021-10-05 PROCEDURE — 71045 X-RAY EXAM CHEST 1 VIEW: CPT

## 2021-10-05 PROCEDURE — 63710000001 ONDANSETRON ODT 4 MG TABLET DISPERSIBLE: Performed by: FAMILY MEDICINE

## 2021-10-05 RX ADMIN — ONDANSETRON 4 MG: 4 TABLET, ORALLY DISINTEGRATING ORAL at 09:02

## 2021-10-05 RX ADMIN — LINEZOLID 600 MG: 600 TABLET, FILM COATED ORAL at 08:58

## 2021-10-05 RX ADMIN — FAMOTIDINE 20 MG: 20 TABLET, FILM COATED ORAL at 21:37

## 2021-10-05 RX ADMIN — IPRATROPIUM BROMIDE AND ALBUTEROL SULFATE 3 ML: 2.5; .5 SOLUTION RESPIRATORY (INHALATION) at 06:48

## 2021-10-05 RX ADMIN — GUAIFENESIN 1200 MG: 600 TABLET, EXTENDED RELEASE ORAL at 08:58

## 2021-10-05 RX ADMIN — GUAIFENESIN 1200 MG: 600 TABLET, EXTENDED RELEASE ORAL at 21:37

## 2021-10-05 RX ADMIN — BUDESONIDE AND FORMOTEROL FUMARATE DIHYDRATE 2 PUFF: 160; 4.5 AEROSOL RESPIRATORY (INHALATION) at 19:25

## 2021-10-05 RX ADMIN — IPRATROPIUM BROMIDE AND ALBUTEROL SULFATE 3 ML: 2.5; .5 SOLUTION RESPIRATORY (INHALATION) at 15:00

## 2021-10-05 RX ADMIN — IPRATROPIUM BROMIDE AND ALBUTEROL SULFATE 3 ML: 2.5; .5 SOLUTION RESPIRATORY (INHALATION) at 19:25

## 2021-10-05 RX ADMIN — FAMOTIDINE 20 MG: 20 TABLET, FILM COATED ORAL at 08:58

## 2021-10-05 RX ADMIN — BUDESONIDE AND FORMOTEROL FUMARATE DIHYDRATE 2 PUFF: 160; 4.5 AEROSOL RESPIRATORY (INHALATION) at 06:48

## 2021-10-05 RX ADMIN — IPRATROPIUM BROMIDE AND ALBUTEROL SULFATE 3 ML: 2.5; .5 SOLUTION RESPIRATORY (INHALATION) at 11:13

## 2021-10-05 RX ADMIN — LINEZOLID 600 MG: 600 TABLET, FILM COATED ORAL at 21:37

## 2021-10-05 NOTE — PROGRESS NOTES
"Patient name: Lamar Rodriguez  Patient : 2000  VISIT # [id number]  MR #[id number]    Procedure: Right thoracostomy tube placement  Procedure Date: 2021  POD:14  Bronchoscopy #1  POD 11  Bronchoscopy #2  POD 5    Subjective   Chief Complaint   Patient presents with   • Chest Pain     Resting in bed on room air.  Tolerating oral antibiotics.   Her cough is better with more force. Clearing secretions better.  IS 1750 ml. Right chest tube to waterseal.  Afebrile overnight.      Telemetry: sinus  ROS: no sweats or chills       Objective     Visit Vitals  BP (!) 87/44 (BP Location: Right arm, Patient Position: Lying)   Pulse 87   Temp 98.3 °F (36.8 °C) (Oral)   Resp 16   Ht 177.8 cm (70\")   Wt 65.1 kg (143 lb 8.3 oz)   LMP 2021 (Approximate)   SpO2 94%   Breastfeeding No   BMI 20.59 kg/m²   AF    Intake/Output Summary (Last 24 hours) at 10/5/2021 1031  Last data filed at 10/5/2021 0850  Gross per 24 hour   Intake --   Output 1100 ml   Net -1100 ml     R CT: No significant output ml/24 hours; no air leak, on waterseal, tidals    Lab:     CBC:  Results from last 7 days   Lab Units 10/05/21  0404 10/04/21  0133 10/03/21  0417   WBC 10*3/mm3 8.17 7.57 6.31   HEMATOCRIT % 29.7* 31.6* 30.6*   PLATELETS 10*3/mm3 286 254 269          BMP:  Results from last 7 days   Lab Units 10/05/21  0404 10/04/21  0133 10/03/21  0417   SODIUM mmol/L 137 136 135*   POTASSIUM mmol/L 4.2 4.0 3.7   CHLORIDE mmol/L 101 101 101   CO2 mmol/L 25.0 24.0 22.0   GLUCOSE mg/dL 99 113* 133*   BUN mg/dL 7 9 8   CREATININE mg/dL 0.38* 0.40* 0.35*          COAG:        Invalid input(s): PT  Blood Culture   Lab   Staphylococcus hominis ssp hominisCritical   [facility]      Isolated from Aerobic and Anaerobic Bottles                Gram Stain  Critical    Lab   Aerobic Bottle Gram positive cocci in clusters  [facility]      Aerobic Bottle Gram positive cocci in clusters [facility]            Susceptibility     Staphylococcus hominis ssp hominis  "    [facility]     Oxacillin 2 ug/ml Resistant     Vancomycin <=0.5 ug/ml Susceptible         Culture results from last 30 days   Lab 09/24/21  1709   FUNGUSCX No fungus isolated at 1 week      BAL Culture   Lab   >100,000 CFU/mL Staphylococcus aureus, MRSAAbnormal    [facility]   Methicillin resistant Staphylococcus aureus, Patient may be an isolation risk.          Gram Stain   Lab   Greater than 25 WBCs per low power field [facility]      No Epithelial cells per low power field [facility]      Few (2+) Gram positive cocci  [facility]                  Specimen Collected: 09/24/21 17:09 Last Resulted: 09/26/21 13:12            IMAGES:       Imaging Results (Last 24 Hours)     Procedure Component Value Units Date/Time    XR Chest 1 View [[id number]] Collected: 10/05/21 0718     Updated: 10/05/21 0722    Narrative:      EXAMINATION: Chest 1 view 10/5/2021     HISTORY: Pneumothorax.     FINDINGS: Today's exam is compared to previous study one day earlier. A  right-sided thoracostomy tube remains in place with no appreciable  pneumothorax. Right basilar atelectasis as well as extrapleural  opacities within the right apex are again demonstrated. The left lung is  fully expanded and clear.       Impression:      1.. Stable 1 day appearance of the chest.  This report was finalized on 10/05/2021 07:19 by Dr. Dayron Lobo MD.          CXR: right chest tube in place, right basilar consolidation-continued mild improvement in aeration again.  The right upper lung field laterally appears to have a mass effect.  This likely is her known fluid filled pneumatocele.  Stable exam.      Physical Exam:  General: No acute distress, in good spirits.   Cardiovascular: RRR, no murmur, rubs, or gallops.    Pulmonary: Clear to auscultation   Abdomen: Soft, non distended, and non tender.  Extremities: Warm, moves all extremities.  Neurologic:  No focal deficits, CN II-XII intact grossly.    Chest: Right chest tube to waterseal.   Neurologic:   Grossly intact with no focal deficits.            Impression:  Acute respiratory failure with hypoxia (CMS/HCC), resolved   Pneumothorax, s/p chest tube placement  Pneumonia-MRSA  Right basilar consolidation persists but is improving  Recent COVID infection   Cavitary lung nodules, multiple   Pulmonary abscesses/pneumatocele   Lactic acidosis, resolved   Bacteremia  Mucus plugging causing right lobar collapse       Plan:  Right chest tube placed to Cook valve. Home teaching for chest tube management completed.  Continue to push pulmonary toilet and ambulation. OK for chest physiotherapy.   Continue supportive care   Antibiotics per ID   DW patient      Tammi Medina, APRN  10/05/21  10:31 CDT

## 2021-10-05 NOTE — PROGRESS NOTES
"Infectious Diseases Progress Note    Patient:  Lamar Rodriguez  YOB: 2000  MRN: [id number]   Admit date: 9/21/2021   Admitting Physician: Chepe Lewis MD  Primary Care Physician: Provider, No Known    Chief Complaint/Interval History: She indicates she was told her lung had expanded.  She has a valve on her chest tube currently.  She is not producing sputum at present.  No fever.    Intake/Output Summary (Last 24 hours) at 10/5/2021 1251  Last data filed at 10/5/2021 0850  Gross per 24 hour   Intake --   Output 1100 ml   Net -1100 ml     Allergies: No Known Allergies  Current Scheduled Medications:   budesonide-formoterol, 2 puff, Inhalation, BID - RT  docusate sodium, 100 mg, Oral, Daily  famotidine, 20 mg, Oral, BID  guaiFENesin, 1,200 mg, Oral, BID  ipratropium-albuterol, 3 mL, Nebulization, 4x Daily - RT  linezolid, 600 mg, Oral, Q12H      Current PRN Medications:  •  acetaminophen  •  ondansetron  •  ondansetron ODT  •  sodium chloride    Review of Systems see HPI    Vital Signs:  BP (!) 87/44 (BP Location: Right arm, Patient Position: Lying)   Pulse 87   Temp 98.3 °F (36.8 °C) (Oral)   Resp 16   Ht 177.8 cm (70\")   Wt 65.1 kg (143 lb 8.3 oz)   LMP 09/22/2021 (Approximate)   SpO2 95%   Breastfeeding No   BMI 20.59 kg/m²     Physical Exam  Vital signs - reviewed.  Line/IV site - No erythema, warmth, induration, or tenderness.  Lungs do seem to reveal better breath sounds in the right base    Lab Results:  CBC:   Results from last 7 days   Lab Units 10/05/21  0404 10/04/21  0133 10/03/21  0417 10/02/21  0431 10/01/21  0616 09/30/21  0620 09/29/21  0643   WBC 10*3/mm3 8.17 7.57 6.31 6.27 6.92 7.86 7.38   HEMOGLOBIN g/dL 9.7* 10.2* 9.7* 9.0* 9.4* 9.5* 10.7*   HEMATOCRIT % 29.7* 31.6* 30.6* 28.9* 29.9* 31.4* 34.6   PLATELETS 10*3/mm3 286 254 269 266 309 373 368     BMP:  Results from last 7 days   Lab Units 10/05/21  0404 10/04/21  0133 10/04/21  0133 10/03/21  0417 10/03/21  0417 " 10/02/21  0431 10/02/21  0431 10/01/21  0616 10/01/21  0616 09/30/21  0620 09/30/21  0620 09/29/21  0643 09/29/21  0643   SODIUM mmol/L 137  --  136  --  135*  --  138  --  138  --  142  --  135*   POTASSIUM mmol/L 4.2  --  4.0  --  3.7  --  3.4*  --  3.8  --  3.8  --  3.9   CHLORIDE mmol/L 101  --  101  --  101  --  104  --  104  --  105  --  101   CO2 mmol/L 25.0  --  24.0  --  22.0  --  25.0  --  26.0  --  29.0  --  28.0   BUN mg/dL 7  --  9  --  8  --  8  --  8  --  10  --  8   CREATININE mg/dL 0.38*  --  0.40*  --  0.35*  --  0.30*  --  0.34*  --  0.33*  --  0.32*   GLUCOSE mg/dL 99   < > 113*   < > 133*   < > 103*   < > 103*   < > 104*   < > 106*   CALCIUM mg/dL 8.7  --  8.8  --  8.6  --  8.5*  --  8.5*  --  8.6  --  8.6    < > = values in this interval not displayed.     Culture Results:   Blood Culture   Date Value Ref Range Status   10/04/2021 No growth at 24 hours  Preliminary   10/04/2021 No growth at 24 hours  Preliminary     Radiology:   CT scan done yesterday:  IMPRESSION:  1. Parenchymal gas and fluid collection at the right upper lobe has  increased in size, now 5.7 x 3.5 cm, previously 3.9 x 3.2 cm on  9/20/2021.  2. Other cavitary lesions are now thin walled and gas-filled rather than  thick-walled with gas and fluid.  3. Small right pleural effusion with right-sided chest tube. Linear  bibasilar atelectasis and patchy opacities.  5. Mildly fatty liver.  This report was finalized on 10/04/2021 13:40 by Dr Kala Whelan MD.        Chest x-ray today:  FINDINGS: Today's exam is compared to previous study one day earlier. A  right-sided thoracostomy tube remains in place with no appreciable  pneumothorax. Right basilar atelectasis as well as extrapleural  opacities within the right apex are again demonstrated. The left lung is  fully expanded and clear.     IMPRESSION:  1.. Stable 1 day appearance of the chest.  This report was finalized on 10/05/2021 07:19 by Dr. Dayron Lobo MD.    Additional  Studies Reviewed: None    Impression:   Previous right pneumothorax.  Has had chest tube placement  Pneumonia with right lower lobe collapse-showing improvement.  MRSA on previous culture.    Recommendations:   Continue linezolid  Encouraged ongoing chest physical therapy, incentive spirometry, and ambulation  Will discuss with thoracic surgery the pleural-based air fluid cavity near her right upper lobe location  Continue to follow    Cesar Guevara MD

## 2021-10-05 NOTE — PLAN OF CARE
Goal Outcome Evaluation:  Plan of Care Reviewed With: patient        Progress: improving  Outcome Summary: Pt does not complain of pain or nausea this shift. Afebrile. Ambulated. Encouraging use of incentive. Resting in bed. PO abx. VSS. Safety maintained.

## 2021-10-05 NOTE — PLAN OF CARE
Goal Outcome Evaluation:  Plan of Care Reviewed With: patient        Progress: improving  Outcome Summary: Pt reports good/improved appetite. She is on a regular diet with avg intake recorded at 56%/4 meals/3days. Boost plus daily continues. Encouraged intake and will continue to follow.

## 2021-10-05 NOTE — PLAN OF CARE
Goal Outcome Evaluation:  Plan of Care Reviewed With: patient        Progress: improving  Outcome Summary: No c/o pain today. R CT switched to cook valve per APRN. Ambulated in halls multiple times. Up to chair for most of day. Very good appetite today. IID. Encouraging IS.

## 2021-10-05 NOTE — PROGRESS NOTES
[facility] Medicine Services  INPATIENT PROGRESS NOTE    Patient Name: Lamar Rodriguez  Date of Admission: 9/21/2021  Today's Date: 10/05/21  Length of Stay: 14  Primary Care Physician: Provider, No Known    Subjective   Chief Complaint: SOA    Doing ok.  Afebrile.  No SOA, on room air            Review of Systems   Constitutional: Positive for fatigue. Negative for fever.   HENT: Negative for congestion and ear pain.    Eyes: Negative for pain and visual disturbance.   Respiratory: Positive for cough and shortness of breath. Negative for wheezing.    Cardiovascular: Positive for chest pain. Negative for palpitations.   Gastrointestinal: Negative for diarrhea, nausea and vomiting.   Endocrine: Negative for heat intolerance.   Genitourinary: Negative for dysuria and frequency.   Musculoskeletal: Negative for arthralgias and back pain.   Skin: Negative for rash and wound.   Neurological: Negative for dizziness and light-headedness.   Psychiatric/Behavioral: Negative for confusion. The patient is not nervous/anxious.    All other systems reviewed and are negative.       All pertinent negatives and positives are as above. All other systems have been reviewed and are negative unless otherwise stated.     Objective    Temp:  [98 °F (36.7 °C)-99.4 °F (37.4 °C)] 98 °F (36.7 °C)  Heart Rate:  [79-98] 92  Resp:  [16] 16  BP: (87-98)/(44-57) 98/57  Physical Exam  Constitutional:       Appearance: Normal appearance. She is well-developed.      Interventions: Nasal cannula in place.      Comments: Chest tube to waterseal   HENT:      Head: Normocephalic and atraumatic.      Right Ear: Tympanic membrane, ear canal and external ear normal.      Left Ear: Tympanic membrane, ear canal and external ear normal.      Nose: Nose normal.      Mouth/Throat:      Mouth: Mucous membranes are moist.      Pharynx: Oropharynx is clear.   Eyes:      Extraocular Movements: Extraocular movements intact.       Conjunctiva/sclera: Conjunctivae normal.      Pupils: Pupils are equal, round, and reactive to light.   Cardiovascular:      Rate and Rhythm: Normal rate and regular rhythm.      Heart sounds: Normal heart sounds.   Pulmonary:      Effort: Pulmonary effort is normal.      Breath sounds: Decreased breath sounds present.   Abdominal:      General: Bowel sounds are normal. There is no distension.      Palpations: Abdomen is soft.      Tenderness: There is no abdominal tenderness.   Musculoskeletal:         General: Normal range of motion.      Cervical back: Normal range of motion and neck supple.   Skin:     General: Skin is warm and dry.   Neurological:      Mental Status: She is alert and oriented to person, place, and time.   Psychiatric:         Mood and Affect: Mood normal.         Speech: Speech normal.         Behavior: Behavior normal.         Thought Content: Thought content normal.         Judgment: Judgment normal.             Results Review:  I have reviewed the labs, radiology results, and diagnostic studies.    Laboratory Data:   Results from last 7 days   Lab Units 10/05/21  0404 10/04/21  0133 10/03/21  0417   WBC 10*3/mm3 8.17 7.57 6.31   HEMOGLOBIN g/dL 9.7* 10.2* 9.7*   HEMATOCRIT % 29.7* 31.6* 30.6*   PLATELETS 10*3/mm3 286 254 269        Results from last 7 days   Lab Units 10/05/21  0404 10/04/21  0133 10/03/21  0417   SODIUM mmol/L 137 136 135*   POTASSIUM mmol/L 4.2 4.0 3.7   CHLORIDE mmol/L 101 101 101   CO2 mmol/L 25.0 24.0 22.0   BUN mg/dL 7 9 8   CREATININE mg/dL 0.38* 0.40* 0.35*   CALCIUM mg/dL 8.7 8.8 8.6   GLUCOSE mg/dL 99 113* 133*       Culture Data:   Blood Culture   Date Value Ref Range Status   09/21/2021 No growth at 24 hours  Preliminary       Radiology Data:   Imaging Results (Last 24 Hours)     Procedure Component Value Units Date/Time    XR Chest 1 View [[id number]] Collected: 10/05/21 0718     Updated: 10/05/21 0722    Narrative:      EXAMINATION: Chest 1 view 10/5/2021      HISTORY: Pneumothorax.     FINDINGS: Today's exam is compared to previous study one day earlier. A  right-sided thoracostomy tube remains in place with no appreciable  pneumothorax. Right basilar atelectasis as well as extrapleural  opacities within the right apex are again demonstrated. The left lung is  fully expanded and clear.       Impression:      1.. Stable 1 day appearance of the chest.  This report was finalized on 10/05/2021 07:19 by Dr. Dayron Lobo MD.          I have reviewed the patient's current medications.       Labs reviewed:  Anemia    Imaging reviewed: CXR    Telemetry reviewed    Telemetry independently interpreted by me: NSR      Assessment/Plan     Active Hospital Problems    Diagnosis    • **Pneumonia of right lower lobe due to infectious organism      Added automatically from request for surgery [id number]     • Pneumothorax    • Mucus plugging of bronchi    • Acute respiratory failure with hypoxia (HCC)      1.  Pneumothorax  -s/p Chest tube  -CTS following     2.  COVID-19  -Decadron  -previously got Remdesivir while in the hospital  -ID following     3.  Lactic acidosis  -continue to improve oxygenation     4.  Secondary bacterial pneumonia  -PO Abx  -ID following  -pulm following       5.  Pulmonary abscess  -PO Abx  -ID following  -CTS following    6.  Strep Lugdunensis bacteremia  -PO abx per ID            Discharge Planning: I expect the patient to be discharged to home in 6-7 days    Electronically signed by Chepe Lewis MD, 10/05/21, 15:53 CDT.

## 2021-10-06 ENCOUNTER — APPOINTMENT (OUTPATIENT)
Dept: GENERAL RADIOLOGY | Facility: HOSPITAL | Age: 21
End: 2021-10-06

## 2021-10-06 VITALS
OXYGEN SATURATION: 97 % | HEART RATE: 84 BPM | RESPIRATION RATE: 16 BRPM | BODY MASS INDEX: 20.55 KG/M2 | SYSTOLIC BLOOD PRESSURE: 93 MMHG | TEMPERATURE: 97.7 F | WEIGHT: 143.52 LBS | DIASTOLIC BLOOD PRESSURE: 62 MMHG | HEIGHT: 70 IN

## 2021-10-06 LAB
ANION GAP SERPL CALCULATED.3IONS-SCNC: 10 MMOL/L (ref 5–15)
BUN SERPL-MCNC: 11 MG/DL (ref 6–20)
BUN/CREAT SERPL: 34.4 (ref 7–25)
CALCIUM SPEC-SCNC: 8.8 MG/DL (ref 8.6–10.5)
CHLORIDE SERPL-SCNC: 104 MMOL/L (ref 98–107)
CO2 SERPL-SCNC: 25 MMOL/L (ref 22–29)
CREAT SERPL-MCNC: 0.32 MG/DL (ref 0.57–1)
CRP SERPL-MCNC: 9.52 MG/DL (ref 0–0.5)
DEPRECATED RDW RBC AUTO: 46.5 FL (ref 37–54)
ERYTHROCYTE [DISTWIDTH] IN BLOOD BY AUTOMATED COUNT: 15.3 % (ref 12.3–15.4)
GFR SERPL CREATININE-BSD FRML MDRD: >150 ML/MIN/1.73
GLUCOSE SERPL-MCNC: 96 MG/DL (ref 65–99)
HCT VFR BLD AUTO: 29 % (ref 34–46.6)
HGB BLD-MCNC: 9.3 G/DL (ref 12–15.9)
MCH RBC QN AUTO: 26.8 PG (ref 26.6–33)
MCHC RBC AUTO-ENTMCNC: 32.1 G/DL (ref 31.5–35.7)
MCV RBC AUTO: 83.6 FL (ref 79–97)
PLATELET # BLD AUTO: 278 10*3/MM3 (ref 140–450)
PMV BLD AUTO: 10.2 FL (ref 6–12)
POTASSIUM SERPL-SCNC: 3.7 MMOL/L (ref 3.5–5.2)
RBC # BLD AUTO: 3.47 10*6/MM3 (ref 3.77–5.28)
SODIUM SERPL-SCNC: 139 MMOL/L (ref 136–145)
WBC # BLD AUTO: 4.94 10*3/MM3 (ref 3.4–10.8)

## 2021-10-06 PROCEDURE — 85027 COMPLETE CBC AUTOMATED: CPT | Performed by: NURSE PRACTITIONER

## 2021-10-06 PROCEDURE — 94799 UNLISTED PULMONARY SVC/PX: CPT

## 2021-10-06 PROCEDURE — 71045 X-RAY EXAM CHEST 1 VIEW: CPT

## 2021-10-06 PROCEDURE — 86140 C-REACTIVE PROTEIN: CPT | Performed by: FAMILY MEDICINE

## 2021-10-06 PROCEDURE — 99231 SBSQ HOSP IP/OBS SF/LOW 25: CPT | Performed by: NURSE PRACTITIONER

## 2021-10-06 PROCEDURE — 63710000001 ONDANSETRON ODT 4 MG TABLET DISPERSIBLE: Performed by: FAMILY MEDICINE

## 2021-10-06 PROCEDURE — 80048 BASIC METABOLIC PNL TOTAL CA: CPT | Performed by: NURSE PRACTITIONER

## 2021-10-06 PROCEDURE — 94669 MECHANICAL CHEST WALL OSCILL: CPT

## 2021-10-06 RX ORDER — ONDANSETRON 4 MG/1
4 TABLET, ORALLY DISINTEGRATING ORAL EVERY 8 HOURS PRN
Qty: 30 TABLET | Refills: 1 | Status: SHIPPED | OUTPATIENT
Start: 2021-10-06 | End: 2022-12-28

## 2021-10-06 RX ORDER — LINEZOLID 600 MG/1
600 TABLET, FILM COATED ORAL EVERY 12 HOURS SCHEDULED
Qty: 20 TABLET | Refills: 0 | Status: SHIPPED | OUTPATIENT
Start: 2021-10-06 | End: 2021-10-16

## 2021-10-06 RX ADMIN — IPRATROPIUM BROMIDE AND ALBUTEROL SULFATE 3 ML: 2.5; .5 SOLUTION RESPIRATORY (INHALATION) at 11:04

## 2021-10-06 RX ADMIN — SODIUM CHLORIDE, PRESERVATIVE FREE 10 ML: 5 INJECTION INTRAVENOUS at 08:14

## 2021-10-06 RX ADMIN — GUAIFENESIN 1200 MG: 600 TABLET, EXTENDED RELEASE ORAL at 08:14

## 2021-10-06 RX ADMIN — IPRATROPIUM BROMIDE AND ALBUTEROL SULFATE 3 ML: 2.5; .5 SOLUTION RESPIRATORY (INHALATION) at 15:02

## 2021-10-06 RX ADMIN — FAMOTIDINE 20 MG: 20 TABLET, FILM COATED ORAL at 08:14

## 2021-10-06 RX ADMIN — IPRATROPIUM BROMIDE AND ALBUTEROL SULFATE 3 ML: 2.5; .5 SOLUTION RESPIRATORY (INHALATION) at 06:54

## 2021-10-06 RX ADMIN — BUDESONIDE AND FORMOTEROL FUMARATE DIHYDRATE 2 PUFF: 160; 4.5 AEROSOL RESPIRATORY (INHALATION) at 06:54

## 2021-10-06 RX ADMIN — ONDANSETRON 4 MG: 4 TABLET, ORALLY DISINTEGRATING ORAL at 08:14

## 2021-10-06 RX ADMIN — LINEZOLID 600 MG: 600 TABLET, FILM COATED ORAL at 08:14

## 2021-10-06 RX ADMIN — LINEZOLID 600 MG: 600 TABLET, FILM COATED ORAL at 18:40

## 2021-10-06 NOTE — PROGRESS NOTES
"Patient name: Lamar Rodriguez  Patient : 2000  VISIT # [id number]  MR #[id number]    Procedure: Right thoracostomy tube placement  Procedure Date: 2021  POD:15  Bronchoscopy #1  POD 12  Bronchoscopy #2  POD 6    Subjective   Chief Complaint   Patient presents with   • Chest Pain     Resting in bed on room air.  Tolerating oral antibiotics.   Continues to cough and clear secretions.  IS 1750 ml. Right chest tube placed to Cook valve yesterday and patient has tolerated well.    Telemetry: sinus  ROS: no sweats or chills       Objective     Visit Vitals  BP 99/48 (BP Location: Right arm, Patient Position: Lying)   Pulse 87   Temp 98.2 °F (36.8 °C) (Oral)   Resp 18   Ht 177.8 cm (70\")   Wt 65.1 kg (143 lb 8.3 oz)   LMP 2021 (Approximate)   SpO2 97%   Breastfeeding No   BMI 20.59 kg/m²   AF    Intake/Output Summary (Last 24 hours) at 10/6/2021 0959  Last data filed at 10/6/2021 0733  Gross per 24 hour   Intake --   Output 1100 ml   Net -1100 ml     R CT: No significant output, Cook valve in place.    Lab:     CBC:  Results from last 7 days   Lab Units 10/06/21  0604 10/05/21  0404 10/04/21  0133   WBC 10*3/mm3 4.94 8.17 7.57   HEMATOCRIT % 29.0* 29.7* 31.6*   PLATELETS 10*3/mm3 278 286 254          BMP:  Results from last 7 days   Lab Units 10/06/21  0604 10/05/21  0404 10/04/21  0133   SODIUM mmol/L 139 137 136   POTASSIUM mmol/L 3.7 4.2 4.0   CHLORIDE mmol/L 104 101 101   CO2 mmol/L 25.0 25.0 24.0   GLUCOSE mg/dL 96 99 113*   BUN mg/dL 11 7 9   CREATININE mg/dL 0.32* 0.38* 0.40*          COAG:        Invalid input(s): PT  Blood Culture   Lab   Staphylococcus hominis ssp hominisCritical   [facility]      Isolated from Aerobic and Anaerobic Bottles                Gram Stain  Critical    Lab   Aerobic Bottle Gram positive cocci in clusters  [facility]      Aerobic Bottle Gram positive cocci in clusters [facility]            Susceptibility     Staphylococcus hominis ssp hominis     [facility]     Oxacillin 2 ug/ml " Resistant     Vancomycin <=0.5 ug/ml Susceptible         Culture results from last 30 days   Lab 09/24/21  1709   FUNGUSCX No fungus isolated at 1 week      BAL Culture   Lab   >100,000 CFU/mL Staphylococcus aureus, MRSAAbnormal    [facility]   Methicillin resistant Staphylococcus aureus, Patient may be an isolation risk.          Gram Stain   Lab   Greater than 25 WBCs per low power field [facility]      No Epithelial cells per low power field [facility]      Few (2+) Gram positive cocci [facility]                  Specimen Collected: 09/24/21 17:09 Last Resulted: 09/26/21 13:12            IMAGES:       Imaging Results (Last 24 Hours)     Procedure Component Value Units Date/Time    XR Chest 1 View [[id number]] Collected: 10/06/21 0732     Updated: 10/06/21 0737    Narrative:      EXAMINATION: XR CHEST 1 VW-. 10/6/2021 7:32 AM CDT     CHEST, ONE VIEW:     HISTORY: Pneumothorax     COMPARISON: 10/5/2021 and 10/4/2021     A single frontal chest radiograph was obtained.     FINDINGS:     Right-sided chest tube identified.     Decreasing right pleural based air space nodularity is observed. There  is persistent linear airspace opacities the right lower lobe.     There is no measurable pneumothorax.     The left lung is clear.     The heart is normal in size without heart failure                                     Impression:      1. Decreasing pleural-based right upper lobe nodularity with chest tube  in place without measurable pneumothorax. Persistent right lower lobe  airspace opacities.     This report was finalized on 10/06/2021 07:34 by Dr. Ricardo Bennett MD.          CXR: right chest tube in place, right basilar consolidation with improved aeration.  The right upper lung field laterally appears to have a mass effect.  This likely is her known fluid filled pneumatocele.  Slightly improved today.      Physical Exam:  General: No acute distress, in good spirits.   Cardiovascular: RRR, no murmur, rubs, or gallops.     Pulmonary: Clear to auscultation   Abdomen: Soft, non distended, and non tender.  Extremities: Warm, moves all extremities.  Neurologic:  No focal deficits, CN II-XII intact grossly.    Chest: Right chest tube to Cook valve  Neurologic:  Grossly intact with no focal deficits.            Impression:  Acute respiratory failure with hypoxia (CMS/HCC), resolved   Pneumothorax, s/p chest tube placement  Pneumonia-MRSA  Right basilar consolidation persists but is improving  Recent COVID infection   Cavitary lung nodules, multiple   Pulmonary abscesses/pneumatocele   Lactic acidosis, resolved   Bacteremia  Mucus plugging causing right lobar collapse       Plan:  Continue chest tube to cook valve.    Continue to push pulmonary toilet and ambulation. OK for chest physiotherapy.   Antibiotics per ID   OK for DC home today from CT Surgery standpoint when Ok with others.  DW patient      Tammi Medina, APRN  10/06/21  09:59 CDT

## 2021-10-06 NOTE — DISCHARGE SUMMARY
[facility] Medicine Services  DISCHARGE SUMMARY       Date of Admission: 9/21/2021  Date of Discharge:  10/6/2021  Primary Care Physician: Provider, No Known    Presenting Problem/History of Present Illness:  Acute respiratory failure with hypoxia (HCC) [J96.01]     Final Discharge Diagnoses:  Active Hospital Problems    Diagnosis    • **Pneumonia of right lower lobe due to infectious organism      Added automatically from request for surgery [id number]     • Pneumothorax    • Mucus plugging of bronchi    • Acute respiratory failure with hypoxia (HCC)    1.  Pneumothorax  -s/p Chest tube  -CTS following     2.  COVID-19  -Decadron  -previously got Remdesivir while in the hospital  -ID following     3.  Lactic acidosis  -continue to improve oxygenation     4.  Secondary bacterial pneumonia due to MRSA  -PO Abx  -ID following  -pulm following        5.  Pulmonary abscess  -PO Abx  -ID following  -CTS following     6.  Staph Lugdunensis bacteremia  -PO abx per ID          Consults:   1.  Pulmonology  2.  CT Surgery  3.  Infectious Disease    Procedures Performed:   1.  Bronchoscopy x 2  2.  Chest tube placement    Pertinent Test Results:   CTA Chest:  1. Significant worsening of right-sided pneumonia now with dense  consolidation of most of the right lower lobe. Drowned lung appearance  with developing small abscesses. Increased cavitary nodules within the  right upper lobe. Consider septic emboli as well as aspiration.  2. Right chest tube present. Trace pleural air.  3. No pulmonary embolism.         Results for orders placed during the hospital encounter of 09/21/21    Adult Transthoracic Echo Complete W/ Cont if Necessary Per Protocol    Interpretation Summary  · Left ventricular wall thickness is consistent with borderline concentric hypertrophy.  · Estimated left ventricular EF = 55% Left ventricular systolic function is normal.  · Left ventricular diastolic function was  "normal.      Chief Complaint on Day of Discharge:   \"I want to go home.\"    History of Present Illness on Day of Discharge:   Doing ok, afebrile, no SOA, tolerating room air, wants to go home.     Hospital Course:  The patient is a 21 y.o. female who presented to [facility] with SOA and fever.  She was previously here with COVID-19 pneumonia.      She was found to have a pneumothorax and a dense RLL consolidation with areas concerning for abscess.  A chest tube was placed in the ER.  She was started on broad spectrum antibiotics.  Blood and sputum cultures were obtained.  Infectious Disease, Pulmonology, and CT surgery were consulted.      Infectious disease managed her antibiotics.  Blood cultures from 9/21 grew Staph Lugdenesis in 1/2 sets.  CT surgery performed bronchoscopy and took cultures.  Cultures grew MRSA.  She has been treated with culture specific antibiotics.      CT surgery managed her chest tube and pneumothorax.  They performed two bronchoscopies.  They performed lavage on the 1st and submitted the lavage for cultures.  They cleared mucus plugging on both bronchoscopies.  Serial imaging was performed.      She has been transitioned to Oral Zyvox.  She has been undergoing chest physiotherapy.  She has continued to improve.  She is afebrile.  She is not requiring oxygen.  CT surgery has put her chest tube to a cook valve.      She would like to go home.  CT surgery and infectious Disease are ok with her being discharged.    She is comfortable with being discharged and with the discharge plan.  She was given the chance to ask questions and all questions were answered to her satisfaction.            Condition on Discharge:    stable    Physical Exam on Discharge:  BP 93/62 (BP Location: Left arm, Patient Position: Sitting)   Pulse 84   Temp 97.7 °F (36.5 °C) (Oral)   Resp 16   Ht 177.8 cm (70\")   Wt 65.1 kg (143 lb 8.3 oz)   LMP 09/22/2021 (Approximate)   SpO2 97%   Breastfeeding No  "  BMI 20.59 kg/m²   Physical Exam  Constitutional:       Appearance: Normal appearance. She is well-developed.   HENT:      Head: Normocephalic and atraumatic.      Right Ear: Tympanic membrane, ear canal and external ear normal.      Left Ear: Tympanic membrane, ear canal and external ear normal.      Nose: Nose normal.      Mouth/Throat:      Mouth: Mucous membranes are moist.      Pharynx: Oropharynx is clear.   Eyes:      Extraocular Movements: Extraocular movements intact.      Conjunctiva/sclera: Conjunctivae normal.      Pupils: Pupils are equal, round, and reactive to light.   Cardiovascular:      Rate and Rhythm: Normal rate and regular rhythm.      Heart sounds: Normal heart sounds.   Pulmonary:      Effort: Pulmonary effort is normal.      Breath sounds: Normal breath sounds.   Chest:      Comments: Chest tube in place  Abdominal:      General: Bowel sounds are normal. There is no distension.      Palpations: Abdomen is soft.      Tenderness: There is no abdominal tenderness.   Musculoskeletal:         General: Normal range of motion.      Cervical back: Normal range of motion and neck supple.   Skin:     General: Skin is warm and dry.   Neurological:      Mental Status: She is alert and oriented to person, place, and time.   Psychiatric:         Mood and Affect: Mood normal.         Speech: Speech normal.         Behavior: Behavior normal.         Thought Content: Thought content normal.         Judgment: Judgment normal.           Discharge Disposition:  Home or Self Care    Discharge Medications:     Discharge Medications      New Medications      Instructions Start Date   linezolid 600 MG tablet  Commonly known as: ZYVOX   600 mg, Oral, Every 12 Hours Scheduled      ondansetron ODT 4 MG disintegrating tablet  Commonly known as: Zofran ODT   4 mg, Translingual, Every 8 Hours PRN         Continue These Medications      Instructions Start Date   acetaminophen 325 MG tablet  Commonly known as: TYLENOL    650 mg, Oral, Every 4 Hours PRN      albuterol sulfate  (90 Base) MCG/ACT inhaler  Commonly known as: PROVENTIL HFA;VENTOLIN HFA;PROAIR HFA   2 puffs, Inhalation, 4 Times Daily - RT      ascorbic acid 500 MG tablet  Commonly known as: VITAMIN C   500 mg, Oral, Daily      thiamine 100 MG tablet  tablet  Commonly known as: VITAMIN B-1   100 mg, Oral, Daily      zinc sulfate 220 (50 Zn) MG capsule  Commonly known as: ZINCATE   220 mg, Oral, Daily         Stop These Medications    dexamethasone 6 MG tablet  Commonly known as: DECADRON     famotidine 20 MG tablet  Commonly known as: PEPCID     guaiFENesin 600 MG 12 hr tablet  Commonly known as: MUCINEX     melatonin 3 MG tablet            Discharge Diet: regular    Activity at Discharge: as tolerated    Discharge Care Plan/Instructions:   Go to ER for fever or worsening SOA    Follow-up Appointments:   Future Appointments   Date Time Provider Department Center   10/19/2021 10:30 AM Tammi Loomis APRN [facility]  [facility]       Test Results Pending at Discharge: n/a    Electronically signed by Chepe Lewis MD, 10/06/21, 18:33 CDT.    Time: 45 minutes

## 2021-10-06 NOTE — PAYOR COMM NOTE
"Lamar Montiel (21 y.o. Female) [facility]-[id number]   CONT STAY  Please review clinical  Pt remains in hospital  Requesting additional days     [facility]   jesús phone    Fax       Date of Birth Social Security Number Address Home Phone MRN    2000  [address] [place] [place] [address] [phone] [id number]    Zoroastrian Marital Status          Buddhist Single       Admission Date Admission Type Admitting Provider Attending Provider Department, Room/Bed    9/21/21 Emergency Chepe Lewis MD Moore, Jonathan Scott, MD [facility] 3C, 362/1    Discharge Date Discharge Disposition Discharge Destination                       Attending Provider: Chepe Lewis MD    Allergies: No Known Allergies    Isolation: None   Infection: MRSA (09/21/21), COVID (History) (09/27/21)   Code Status: CPR    Ht: 177.8 cm (70\")   Wt: 65.1 kg (143 lb 8.3 oz)    Admission Cmt: None   Principal Problem: Pneumonia of right lower lobe due to infectious organism [J18.9] More...                 Active Insurance as of 9/21/2021     Primary Coverage     Payor Plan Insurance Group Employer/Plan Group    [employer] - ASA [id number]     Payor Plan Address Payor Plan Phone Number Payor Plan Fax Number Effective Dates    [address]   1/1/2016 - None Entered    [facility] [id number]       Subscriber Name Subscriber Birth Date Member ID       RENEE MONTIEL 4/18/1972 [id number]                 Emergency Contacts      (Rel.) Home Phone Work Phone Mobile Phone    Renee Montiel (Mother) [phone] -- --              Current Facility-Administered Medications   Medication Dose Route Frequency Provider Last Rate Last Admin   • acetaminophen (TYLENOL) tablet 650 mg  650 mg Oral Q6H PRN Tammi Loomis APRN   650 mg at 10/04/21 0119   • budesonide-formoterol (SYMBICORT) 160-4.5 MCG/ACT inhaler 2 puff  2 puff Inhalation BID - RT Tammi Loomis APRN   2 puff at 10/06/21 " "0654   • docusate sodium (COLACE) capsule 100 mg  100 mg Oral Daily Tammi Loomis APRN   100 mg at 10/04/21 0854   • famotidine (PEPCID) tablet 20 mg  20 mg Oral BID Tammi Loomis APRN   20 mg at 10/06/21 0814   • guaiFENesin (MUCINEX) 12 hr tablet 1,200 mg  1,200 mg Oral BID Tammi Loomis APRN   1,200 mg at 10/06/21 0814   • ipratropium-albuterol (DUO-NEB) nebulizer solution 3 mL  3 mL Nebulization 4x Daily - RT Tammi Loomis APRN   3 mL at 10/06/21 1104   • linezolid (ZYVOX) tablet 600 mg  600 mg Oral Q12H Cesar Norton MD   600 mg at 10/06/21 0814   • ondansetron (ZOFRAN) injection 4 mg  4 mg Intravenous Q6H PRN Tammi Loomis APRN   4 mg at 21 0946   • ondansetron ODT (ZOFRAN-ODT) disintegrating tablet 4 mg  4 mg Oral Q6H PRN Tim Neil MD   4 mg at 10/06/21 0814   • sodium chloride 0.9 % flush 10 mL  10 mL Intravenous PRN Tammi Loomis APRN   10 mL at 10/06/21 0814        Physician Progress Notes (last 24 hours) (Notes from 10/05/21 1204 through 10/06/21 1204)      Tammi Medina APRN at 10/06/21 0959          Patient name: Lamar Rodriguez  Patient : 2000  VISIT # [id number]  MR #[id number]    Procedure: Right thoracostomy tube placement  Procedure Date: 2021  POD:15  Bronchoscopy #1  POD 12  Bronchoscopy #2  POD 6    Subjective   Chief Complaint   Patient presents with   • Chest Pain     Resting in bed on room air.  Tolerating oral antibiotics.   Continues to cough and clear secretions.  IS 1750 ml. Right chest tube placed to Cook valve yesterday and patient has tolerated well.    Telemetry: sinus  ROS: no sweats or chills      Objective     Visit Vitals  BP 99/48 (BP Location: Right arm, Patient Position: Lying)   Pulse 87   Temp 98.2 °F (36.8 °C) (Oral)   Resp 18   Ht 177.8 cm (70\")   Wt 65.1 kg (143 lb 8.3 oz)   LMP 2021 (Approximate)   SpO2 97%   Breastfeeding No   BMI 20.59 kg/m²   AF    Intake/Output Summary (Last 24 hours) at 10/6/2021 " 0959  Last data filed at 10/6/2021 0733  Gross per 24 hour   Intake --   Output 1100 ml   Net -1100 ml     R CT: No significant output, Cook valve in place.    Lab:     CBC:  Results from last 7 days   Lab Units 10/06/21  0604 10/05/21  0404 10/04/21  0133   WBC 10*3/mm3 4.94 8.17 7.57   HEMATOCRIT % 29.0* 29.7* 31.6*   PLATELETS 10*3/mm3 278 286 254          BMP:  Results from last 7 days   Lab Units 10/06/21  0604 10/05/21  0404 10/04/21  0133   SODIUM mmol/L 139 137 136   POTASSIUM mmol/L 3.7 4.2 4.0   CHLORIDE mmol/L 104 101 101   CO2 mmol/L 25.0 25.0 24.0   GLUCOSE mg/dL 96 99 113*   BUN mg/dL 11 7 9   CREATININE mg/dL 0.32* 0.38* 0.40*          COAG:        Invalid input(s): PT  Blood Culture   Lab   Staphylococcus hominis ssp hominisCritical   [facility]      Isolated from Aerobic and Anaerobic Bottles                Gram Stain  Critical    Lab   Aerobic Bottle Gram positive cocci in clusters [facility]      Aerobic Bottle Gram positive cocci in clusters [facility]            Susceptibility     Staphylococcus hominis ssp hominis     DORA     Oxacillin 2 ug/ml Resistant     Vancomycin <=0.5 ug/ml Susceptible         Culture results from last 30 days   Lab 09/24/21  1709   FUNGUSCX No fungus isolated at 1 week      BAL Culture   Lab   >100,000 CFU/mL Staphylococcus aureus, MRSAAbnormal   [facility]   Methicillin resistant Staphylococcus aureus, Patient may be an isolation risk.          Gram Stain   Lab   Greater than 25 WBCs per low power field [facility]      No Epithelial cells per low power field [facility]      Few (2+) Gram positive cocci [facility]                  Specimen Collected: 09/24/21 17:09 Last Resulted: 09/26/21 13:12            IMAGES:       Imaging Results (Last 24 Hours)     Procedure Component Value Units Date/Time    XR Chest 1 View [[id number]] Collected: 10/06/21 0732     Updated: 10/06/21 0737    Narrative:      EXAMINATION: XR CHEST 1 VW-. 10/6/2021 7:32 AM CDT     CHEST, ONE VIEW:      HISTORY: Pneumothorax     COMPARISON: 10/5/2021 and 10/4/2021     A single frontal chest radiograph was obtained.     FINDINGS:     Right-sided chest tube identified.     Decreasing right pleural based air space nodularity is observed. There  is persistent linear airspace opacities the right lower lobe.     There is no measurable pneumothorax.     The left lung is clear.     The heart is normal in size without heart failure                                     Impression:      1. Decreasing pleural-based right upper lobe nodularity with chest tube  in place without measurable pneumothorax. Persistent right lower lobe  airspace opacities.     This report was finalized on 10/06/2021 07:34 by Dr. Ricardo Bennett MD.          CXR: right chest tube in place, right basilar consolidation with improved aeration.  The right upper lung field laterally appears to have a mass effect.  This likely is her known fluid filled pneumatocele.  Slightly improved today.      Physical Exam:  General: No acute distress, in good spirits.   Cardiovascular: RRR, no murmur, rubs, or gallops.    Pulmonary: Clear to auscultation   Abdomen: Soft, non distended, and non tender.  Extremities: Warm, moves all extremities.  Neurologic:  No focal deficits, CN II-XII intact grossly.    Chest: Right chest tube to Cook valve  Neurologic:  Grossly intact with no focal deficits.           Impression:  Acute respiratory failure with hypoxia (CMS/HCC), resolved   Pneumothorax, s/p chest tube placement  Pneumonia-MRSA  Right basilar consolidation persists but is improving  Recent COVID infection   Cavitary lung nodules, multiple   Pulmonary abscesses/pneumatocele   Lactic acidosis, resolved   Bacteremia  Mucus plugging causing right lobar collapse       Plan:  Continue chest tube to cook valve.    Continue to push pulmonary toilet and ambulation. OK for chest physiotherapy.   Antibiotics per ID   OK for DC home today from CT Surgery standpoint when Ok with  others.  DW patient      ZACH Hoff  10/06/21  09:59 CDT        Electronically signed by Tammi Medina APRN at 10/06/21 1002     Chepe Lewis MD at 10/05/21 1552              [facility] Medicine Services  INPATIENT PROGRESS NOTE    Patient Name: Lamar Rodriguez  Date of Admission: 9/21/2021  Today's Date: 10/05/21  Length of Stay: 14  Primary Care Physician: Provider, No Known    Subjective   Chief Complaint: SOA    Doing ok.  Afebrile.  No SOA, on room air            Review of Systems   Constitutional: Positive for fatigue. Negative for fever.   HENT: Negative for congestion and ear pain.    Eyes: Negative for pain and visual disturbance.   Respiratory: Positive for cough and shortness of breath. Negative for wheezing.    Cardiovascular: Positive for chest pain. Negative for palpitations.   Gastrointestinal: Negative for diarrhea, nausea and vomiting.   Endocrine: Negative for heat intolerance.   Genitourinary: Negative for dysuria and frequency.   Musculoskeletal: Negative for arthralgias and back pain.   Skin: Negative for rash and wound.   Neurological: Negative for dizziness and light-headedness.   Psychiatric/Behavioral: Negative for confusion. The patient is not nervous/anxious.    All other systems reviewed and are negative.       All pertinent negatives and positives are as above. All other systems have been reviewed and are negative unless otherwise stated.     Objective    Temp:  [98 °F (36.7 °C)-99.4 °F (37.4 °C)] 98 °F (36.7 °C)  Heart Rate:  [79-98] 92  Resp:  [16] 16  BP: (87-98)/(44-57) 98/57  Physical Exam  Constitutional:       Appearance: Normal appearance. She is well-developed.      Interventions: Nasal cannula in place.      Comments: Chest tube to waterseal   HENT:      Head: Normocephalic and atraumatic.      Right Ear: Tympanic membrane, ear canal and external ear normal.      Left Ear: Tympanic membrane, ear canal and external ear  normal.      Nose: Nose normal.      Mouth/Throat:      Mouth: Mucous membranes are moist.      Pharynx: Oropharynx is clear.   Eyes:      Extraocular Movements: Extraocular movements intact.      Conjunctiva/sclera: Conjunctivae normal.      Pupils: Pupils are equal, round, and reactive to light.   Cardiovascular:      Rate and Rhythm: Normal rate and regular rhythm.      Heart sounds: Normal heart sounds.   Pulmonary:      Effort: Pulmonary effort is normal.      Breath sounds: Decreased breath sounds present.   Abdominal:      General: Bowel sounds are normal. There is no distension.      Palpations: Abdomen is soft.      Tenderness: There is no abdominal tenderness.   Musculoskeletal:         General: Normal range of motion.      Cervical back: Normal range of motion and neck supple.   Skin:     General: Skin is warm and dry.   Neurological:      Mental Status: She is alert and oriented to person, place, and time.   Psychiatric:         Mood and Affect: Mood normal.         Speech: Speech normal.         Behavior: Behavior normal.         Thought Content: Thought content normal.         Judgment: Judgment normal.             Results Review:  I have reviewed the labs, radiology results, and diagnostic studies.    Laboratory Data:   Results from last 7 days   Lab Units 10/05/21  0404 10/04/21  0133 10/03/21  0417   WBC 10*3/mm3 8.17 7.57 6.31   HEMOGLOBIN g/dL 9.7* 10.2* 9.7*   HEMATOCRIT % 29.7* 31.6* 30.6*   PLATELETS 10*3/mm3 286 254 269        Results from last 7 days   Lab Units 10/05/21  0404 10/04/21  0133 10/03/21  0417   SODIUM mmol/L 137 136 135*   POTASSIUM mmol/L 4.2 4.0 3.7   CHLORIDE mmol/L 101 101 101   CO2 mmol/L 25.0 24.0 22.0   BUN mg/dL 7 9 8   CREATININE mg/dL 0.38* 0.40* 0.35*   CALCIUM mg/dL 8.7 8.8 8.6   GLUCOSE mg/dL 99 113* 133*       Culture Data:   Blood Culture   Date Value Ref Range Status   09/21/2021 No growth at 24 hours  Preliminary       Radiology Data:   Imaging Results (Last 24  Hours)     Procedure Component Value Units Date/Time    XR Chest 1 View [[id number]] Collected: 10/05/21 0718     Updated: 10/05/21 0722    Narrative:      EXAMINATION: Chest 1 view 10/5/2021     HISTORY: Pneumothorax.     FINDINGS: Today's exam is compared to previous study one day earlier. A  right-sided thoracostomy tube remains in place with no appreciable  pneumothorax. Right basilar atelectasis as well as extrapleural  opacities within the right apex are again demonstrated. The left lung is  fully expanded and clear.       Impression:      1.. Stable 1 day appearance of the chest.  This report was finalized on 10/05/2021 07:19 by Dr. Dayron Lobo MD.          I have reviewed the patient's current medications.       Labs reviewed:  Anemia    Imaging reviewed: CXR    Telemetry reviewed    Telemetry independently interpreted by me: NSR      Assessment/Plan     Active Hospital Problems    Diagnosis    • **Pneumonia of right lower lobe due to infectious organism      Added automatically from request for surgery [id number]     • Pneumothorax    • Mucus plugging of bronchi    • Acute respiratory failure with hypoxia (HCC)      1.  Pneumothorax  -s/p Chest tube  -CTS following     2.  COVID-19  -Decadron  -previously got Remdesivir while in the hospital  -ID following     3.  Lactic acidosis  -continue to improve oxygenation     4.  Secondary bacterial pneumonia  -PO Abx  -ID following  -pulm following       5.  Pulmonary abscess  -PO Abx  -ID following  -CTS following    6.  Strep Lugdunensis bacteremia  -PO abx per ID            Discharge Planning: I expect the patient to be discharged to home in 6-7 days    Electronically signed by Chepe Lewis MD, 10/05/21, 15:53 CDT.      Electronically signed by Chepe Lewis MD at 10/05/21 1554     Cesar Norton MD at 10/05/21 1251          Infectious Diseases Progress Note    Patient:  Lamar Rodriguez  YOB: 2000  MRN: [id number]   Admit date:  "9/21/2021   Admitting Physician: Chepe Lewis MD  Primary Care Physician: Provider, No Known    Chief Complaint/Interval History: She indicates she was told her lung had expanded.  She has a valve on her chest tube currently.  She is not producing sputum at present.  No fever.    Intake/Output Summary (Last 24 hours) at 10/5/2021 1251  Last data filed at 10/5/2021 0850  Gross per 24 hour   Intake --   Output 1100 ml   Net -1100 ml     Allergies: No Known Allergies  Current Scheduled Medications:   budesonide-formoterol, 2 puff, Inhalation, BID - RT  docusate sodium, 100 mg, Oral, Daily  famotidine, 20 mg, Oral, BID  guaiFENesin, 1,200 mg, Oral, BID  ipratropium-albuterol, 3 mL, Nebulization, 4x Daily - RT  linezolid, 600 mg, Oral, Q12H      Current PRN Medications:  •  acetaminophen  •  ondansetron  •  ondansetron ODT  •  sodium chloride    Review of Systems see HPI    Vital Signs:  BP (!) 87/44 (BP Location: Right arm, Patient Position: Lying)   Pulse 87   Temp 98.3 °F (36.8 °C) (Oral)   Resp 16   Ht 177.8 cm (70\")   Wt 65.1 kg (143 lb 8.3 oz)   LMP 09/22/2021 (Approximate)   SpO2 95%   Breastfeeding No   BMI 20.59 kg/m²     Physical Exam  Vital signs - reviewed.  Line/IV site - No erythema, warmth, induration, or tenderness.  Lungs do seem to reveal better breath sounds in the right base    Lab Results:  CBC:   Results from last 7 days   Lab Units 10/05/21  0404 10/04/21  0133 10/03/21  0417 10/02/21  0431 10/01/21  0616 09/30/21  0620 09/29/21  0643   WBC 10*3/mm3 8.17 7.57 6.31 6.27 6.92 7.86 7.38   HEMOGLOBIN g/dL 9.7* 10.2* 9.7* 9.0* 9.4* 9.5* 10.7*   HEMATOCRIT % 29.7* 31.6* 30.6* 28.9* 29.9* 31.4* 34.6   PLATELETS 10*3/mm3 286 254 269 266 309 373 368     BMP:  Results from last 7 days   Lab Units 10/05/21  0404 10/04/21  0133 10/04/21  0133 10/03/21  0417 10/03/21  0417 10/02/21  0431 10/02/21  0431 10/01/21  0616 10/01/21  0616 09/30/21  0620 09/30/21  0620 09/29/21  0643 09/29/21  0643 "   SODIUM mmol/L 137  --  136  --  135*  --  138  --  138  --  142  --  135*   POTASSIUM mmol/L 4.2  --  4.0  --  3.7  --  3.4*  --  3.8  --  3.8  --  3.9   CHLORIDE mmol/L 101  --  101  --  101  --  104  --  104  --  105  --  101   CO2 mmol/L 25.0  --  24.0  --  22.0  --  25.0  --  26.0  --  29.0  --  28.0   BUN mg/dL 7  --  9  --  8  --  8  --  8  --  10  --  8   CREATININE mg/dL 0.38*  --  0.40*  --  0.35*  --  0.30*  --  0.34*  --  0.33*  --  0.32*   GLUCOSE mg/dL 99   < > 113*   < > 133*   < > 103*   < > 103*   < > 104*   < > 106*   CALCIUM mg/dL 8.7  --  8.8  --  8.6  --  8.5*  --  8.5*  --  8.6  --  8.6    < > = values in this interval not displayed.     Culture Results:   Blood Culture   Date Value Ref Range Status   10/04/2021 No growth at 24 hours  Preliminary   10/04/2021 No growth at 24 hours  Preliminary     Radiology:   CT scan done yesterday:  IMPRESSION:  1. Parenchymal gas and fluid collection at the right upper lobe has  increased in size, now 5.7 x 3.5 cm, previously 3.9 x 3.2 cm on  9/20/2021.  2. Other cavitary lesions are now thin walled and gas-filled rather than  thick-walled with gas and fluid.  3. Small right pleural effusion with right-sided chest tube. Linear  bibasilar atelectasis and patchy opacities.  5. Mildly fatty liver.  This report was finalized on 10/04/2021 13:40 by Dr Kala Whelan MD.        Chest x-ray today:  FINDINGS: Today's exam is compared to previous study one day earlier. A  right-sided thoracostomy tube remains in place with no appreciable  pneumothorax. Right basilar atelectasis as well as extrapleural  opacities within the right apex are again demonstrated. The left lung is  fully expanded and clear.     IMPRESSION:  1.. Stable 1 day appearance of the chest.  This report was finalized on 10/05/2021 07:19 by Dr. Dayron Lobo MD.    Additional Studies Reviewed: None    Impression:   Previous right pneumothorax.  Has had chest tube placement  Pneumonia with right  lower lobe collapse-showing improvement.  MRSA on previous culture.    Recommendations:   Continue linezolid  Encouraged ongoing chest physical therapy, incentive spirometry, and ambulation  Will discuss with thoracic surgery the pleural-based air fluid cavity near her right upper lobe location  Continue to follow    Ceasr Guevara MD    Electronically signed by Cesar Guevara MD at 10/05/21 1257

## 2021-10-06 NOTE — PLAN OF CARE
Goal Outcome Evaluation:  Plan of Care Reviewed With: patient        Progress: improving  Outcome Summary: Pt seems to be in good spirits this shift, hoping to be DC tomorrow. No c/o pain or nausea this shift. Chest tube with cook valve. Up ad em and ambulating in the hallway. Using incentive. VSS. Safety maintained.

## 2021-10-06 NOTE — PROGRESS NOTES
"Infectious Diseases Progress Note    Patient:  Lamar Rodriguez  YOB: 2000  MRN: [id number]   Admit date: 9/21/2021   Admitting Physician: Chepe Lewis MD  Primary Care Physician: Provider, No Known    Chief Complaint/Interval History: She is feeling better.  She has without fever.  No productive cough.  Her coughing with deep breathing has gotten better.  She feels she is moving air better.  She feels ready for discharge home.  Received call from hospitalist earlier that they were hoping for her to be discharged home today.  I had recommended an additional 10 days of linezolid and follow-up with me in 10 to 14 days.    Intake/Output Summary (Last 24 hours) at 10/6/2021 1446  Last data filed at 10/6/2021 0733  Gross per 24 hour   Intake --   Output 700 ml   Net -700 ml     Allergies: No Known Allergies  Current Scheduled Medications:   budesonide-formoterol, 2 puff, Inhalation, BID - RT  docusate sodium, 100 mg, Oral, Daily  famotidine, 20 mg, Oral, BID  guaiFENesin, 1,200 mg, Oral, BID  ipratropium-albuterol, 3 mL, Nebulization, 4x Daily - RT  linezolid, 600 mg, Oral, Q12H      Current PRN Medications:  •  acetaminophen  •  ondansetron  •  ondansetron ODT  •  sodium chloride    Review of Systems no diarrhea or rash.  Good oral intake.     Vital Signs:  BP 93/62 (BP Location: Left arm, Patient Position: Sitting)   Pulse 84   Temp 97.7 °F (36.5 °C) (Oral)   Resp 16   Ht 177.8 cm (70\")   Wt 65.1 kg (143 lb 8.3 oz)   LMP 09/22/2021 (Approximate)   SpO2 97%   Breastfeeding No   BMI 20.59 kg/m²     Physical Exam  Vital signs - reviewed.  Line/IV site - No erythema, warmth, induration, or tenderness.  This is the best I have found her lung exam.  Breath sounds to the right lower lobe seem to be much better.  Chest tube with valve on the right remains in place.  Abdomen is soft and nontender.    Lab Results:  CBC:   Results from last 7 days   Lab Units 10/06/21  0604 10/05/21  0404 " 10/04/21  0133 10/03/21  0417 10/02/21  0431 10/01/21  0616 09/30/21  0620   WBC 10*3/mm3 4.94 8.17 7.57 6.31 6.27 6.92 7.86   HEMOGLOBIN g/dL 9.3* 9.7* 10.2* 9.7* 9.0* 9.4* 9.5*   HEMATOCRIT % 29.0* 29.7* 31.6* 30.6* 28.9* 29.9* 31.4*   PLATELETS 10*3/mm3 278 286 254 269 266 309 373     BMP:  Results from last 7 days   Lab Units 10/06/21  0604 10/05/21  0404 10/05/21  0404 10/04/21  0133 10/04/21  0133 10/03/21  0417 10/03/21  0417 10/02/21  0431 10/02/21  0431 10/01/21  0616 10/01/21  0616 09/30/21  0620 09/30/21  0620   SODIUM mmol/L 139  --  137  --  136  --  135*  --  138  --  138  --  142   POTASSIUM mmol/L 3.7  --  4.2  --  4.0  --  3.7  --  3.4*  --  3.8  --  3.8   CHLORIDE mmol/L 104  --  101  --  101  --  101  --  104  --  104  --  105   CO2 mmol/L 25.0  --  25.0  --  24.0  --  22.0  --  25.0  --  26.0  --  29.0   BUN mg/dL 11  --  7  --  9  --  8  --  8  --  8  --  10   CREATININE mg/dL 0.32*  --  0.38*  --  0.40*  --  0.35*  --  0.30*  --  0.34*  --  0.33*   GLUCOSE mg/dL 96   < > 99   < > 113*   < > 133*   < > 103*   < > 103*   < > 104*   CALCIUM mg/dL 8.8  --  8.7  --  8.8  --  8.6  --  8.5*  --  8.5*  --  8.6    < > = values in this interval not displayed.     Culture Results:   Blood Culture   Date Value Ref Range Status   10/04/2021 No growth at 2 days  Preliminary   10/04/2021 No growth at 2 days  Preliminary     Radiology:     Chest x-ray today:  FINDINGS:  Right-sided chest tube identified.  Decreasing right pleural based air space nodularity is observed. There  is persistent linear airspace opacities the right lower lobe.  There is no measurable pneumothorax.  The left lung is clear.  The heart is normal in size without heart failure                       IMPRESSION:  1. Decreasing pleural-based right upper lobe nodularity with chest tube  in place without measurable pneumothorax. Persistent right lower lobe  airspace opacities.  This report was finalized on 10/06/2021 07:34 by Dr. Ricardo Bennett,  MD.      Additional Studies Reviewed: None    Impression:   Previous right pneumothorax  Chest tube in place  Pneumonia with right lower lobe collapse showing improvement.  MRSA had been recovered on culture.    Recommendations:   Seems to have finally shown better response to her ongoing incentive spirometry, increase mobility, deep breathing, and chest physical therapy.  She appears stable for discharge home.  Suggest linezolid 600 mg orally every 12 hours for an additional 10 days  I would like to see her back in 10 to 14 days  Would be happy to see her sooner if any new or worsening symptoms in the interim\    Cesar Guevara MD

## 2021-10-07 ENCOUNTER — READMISSION MANAGEMENT (OUTPATIENT)
Dept: CALL CENTER | Facility: HOSPITAL | Age: 21
End: 2021-10-07

## 2021-10-07 NOTE — PAYOR COMM NOTE
"DC HOME 10-6-21  [facility]-[id number]    Lamar Montiel (21 y.o. Female)     Date of Birth Social Security Number Address Home Phone MRN    [id number]  [address] [place] [address] [phone] [id number]    Mosque Marital Status          Buddhism Single       Admission Date Admission Type Admitting Provider Attending Provider Department, Room/Bed    9/21/21 Emergency Chepe Lewis MD  [facility] 3C, 362/1    Discharge Date Discharge Disposition Discharge Destination        10/6/2021 Home or Self Care              Attending Provider: (none)   Allergies: No Known Allergies    Isolation: None   Infection: MRSA (09/21/21), COVID (History) (09/27/21)   Code Status: Prior    Ht: 177.8 cm (70\")   Wt: 65.1 kg (143 lb 8.3 oz)    Admission Cmt: None   Principal Problem: Pneumonia of right lower lobe due to infectious organism [J18.9] More...                 Active Insurance as of 9/21/2021     Primary Coverage     Payor Plan Insurance Group Employer/Plan Group    [employer] [facility] - ASA [id number]     Payor Plan Address Payor Plan Phone Number Payor Plan Fax Number Effective Dates    [address]   1/1/2016 - None Entered    [facility] [id number]       Subscriber Name Subscriber Birth Date Member ID       RENEE MONTIEL 4/18/1972 [id number]                 Emergency Contacts      (Rel.) Home Phone Work Phone Mobile Phone    Renee Montiel (Mother) [phone] -- --               Discharge Summary      Chepe Lewis MD at 10/06/21 1507              [facility] Medicine Services  DISCHARGE SUMMARY       Date of Admission: 9/21/2021  Date of Discharge:  10/6/2021  Primary Care Physician: Provider, No Known    Presenting Problem/History of Present Illness:  Acute respiratory failure with hypoxia (HCC) [J96.01]     Final Discharge Diagnoses:  Active Hospital Problems    Diagnosis    • **Pneumonia of right lower lobe due to infectious organism      Added " "automatically from request for surgery [id number]     • Pneumothorax    • Mucus plugging of bronchi    • Acute respiratory failure with hypoxia (HCC)    1.  Pneumothorax  -s/p Chest tube  -CTS following     2.  COVID-19  -Decadron  -previously got Remdesivir while in the hospital  -ID following     3.  Lactic acidosis  -continue to improve oxygenation     4.  Secondary bacterial pneumonia due to MRSA  -PO Abx  -ID following  -pulm following        5.  Pulmonary abscess  -PO Abx  -ID following  -CTS following     6.  Staph Lugdunensis bacteremia  -PO abx per ID          Consults:   1.  Pulmonology  2.  CT Surgery  3.  Infectious Disease    Procedures Performed:   1.  Bronchoscopy x 2  2.  Chest tube placement    Pertinent Test Results:   CTA Chest:  1. Significant worsening of right-sided pneumonia now with dense  consolidation of most of the right lower lobe. Drowned lung appearance  with developing small abscesses. Increased cavitary nodules within the  right upper lobe. Consider septic emboli as well as aspiration.  2. Right chest tube present. Trace pleural air.  3. No pulmonary embolism.         Results for orders placed during the hospital encounter of 09/21/21    Adult Transthoracic Echo Complete W/ Cont if Necessary Per Protocol    Interpretation Summary  · Left ventricular wall thickness is consistent with borderline concentric hypertrophy.  · Estimated left ventricular EF = 55% Left ventricular systolic function is normal.  · Left ventricular diastolic function was normal.      Chief Complaint on Day of Discharge:   \"I want to go home.\"    History of Present Illness on Day of Discharge:   Doing ok, afebrile, no SOA, tolerating room air, wants to go home.     Hospital Course:  The patient is a 21 y.o. female who presented to [facility] with SOA and fever.  She was previously here with COVID-19 pneumonia.      She was found to have a pneumothorax and a dense RLL consolidation with areas concerning " "for abscess.  A chest tube was placed in the ER.  She was started on broad spectrum antibiotics.  Blood and sputum cultures were obtained.  Infectious Disease, Pulmonology, and CT surgery were consulted.      Infectious disease managed her antibiotics.  Blood cultures from 9/21 grew Staph Lugdenesis in 1/2 sets.  CT surgery performed bronchoscopy and took cultures.  Cultures grew MRSA.  She has been treated with culture specific antibiotics.      CT surgery managed her chest tube and pneumothorax.  They performed two bronchoscopies.  They performed lavage on the 1st and submitted the lavage for cultures.  They cleared mucus plugging on both bronchoscopies.  Serial imaging was performed.      She has been transitioned to Oral Zyvox.  She has been undergoing chest physiotherapy.  She has continued to improve.  She is afebrile.  She is not requiring oxygen.  CT surgery has put her chest tube to a cook valve.      She would like to go home.  CT surgery and infectious Disease are ok with her being discharged.    She is comfortable with being discharged and with the discharge plan.  She was given the chance to ask questions and all questions were answered to her satisfaction.            Condition on Discharge:    stable    Physical Exam on Discharge:  BP 93/62 (BP Location: Left arm, Patient Position: Sitting)   Pulse 84   Temp 97.7 °F (36.5 °C) (Oral)   Resp 16   Ht 177.8 cm (70\")   Wt 65.1 kg (143 lb 8.3 oz)   LMP 09/22/2021 (Approximate)   SpO2 97%   Breastfeeding No   BMI 20.59 kg/m²   Physical Exam  Constitutional:       Appearance: Normal appearance. She is well-developed.   HENT:      Head: Normocephalic and atraumatic.      Right Ear: Tympanic membrane, ear canal and external ear normal.      Left Ear: Tympanic membrane, ear canal and external ear normal.      Nose: Nose normal.      Mouth/Throat:      Mouth: Mucous membranes are moist.      Pharynx: Oropharynx is clear.   Eyes:      Extraocular " Movements: Extraocular movements intact.      Conjunctiva/sclera: Conjunctivae normal.      Pupils: Pupils are equal, round, and reactive to light.   Cardiovascular:      Rate and Rhythm: Normal rate and regular rhythm.      Heart sounds: Normal heart sounds.   Pulmonary:      Effort: Pulmonary effort is normal.      Breath sounds: Normal breath sounds.   Chest:      Comments: Chest tube in place  Abdominal:      General: Bowel sounds are normal. There is no distension.      Palpations: Abdomen is soft.      Tenderness: There is no abdominal tenderness.   Musculoskeletal:         General: Normal range of motion.      Cervical back: Normal range of motion and neck supple.   Skin:     General: Skin is warm and dry.   Neurological:      Mental Status: She is alert and oriented to person, place, and time.   Psychiatric:         Mood and Affect: Mood normal.         Speech: Speech normal.         Behavior: Behavior normal.         Thought Content: Thought content normal.         Judgment: Judgment normal.           Discharge Disposition:  Home or Self Care    Discharge Medications:     Discharge Medications      New Medications      Instructions Start Date   linezolid 600 MG tablet  Commonly known as: ZYVOX   600 mg, Oral, Every 12 Hours Scheduled      ondansetron ODT 4 MG disintegrating tablet  Commonly known as: Zofran ODT   4 mg, Translingual, Every 8 Hours PRN         Continue These Medications      Instructions Start Date   acetaminophen 325 MG tablet  Commonly known as: TYLENOL   650 mg, Oral, Every 4 Hours PRN      albuterol sulfate  (90 Base) MCG/ACT inhaler  Commonly known as: PROVENTIL HFA;VENTOLIN HFA;PROAIR HFA   2 puffs, Inhalation, 4 Times Daily - RT      ascorbic acid 500 MG tablet  Commonly known as: VITAMIN C   500 mg, Oral, Daily      thiamine 100 MG tablet  tablet  Commonly known as: VITAMIN B-1   100 mg, Oral, Daily      zinc sulfate 220 (50 Zn) MG capsule  Commonly known as: ZINCATE   220 mg,  Oral, Daily         Stop These Medications    dexamethasone 6 MG tablet  Commonly known as: DECADRON     famotidine 20 MG tablet  Commonly known as: PEPCID     guaiFENesin 600 MG 12 hr tablet  Commonly known as: MUCINEX     melatonin 3 MG tablet            Discharge Diet: regular    Activity at Discharge: as tolerated    Discharge Care Plan/Instructions:   Go to ER for fever or worsening SOA    Follow-up Appointments:   Future Appointments   Date Time Provider Department Center   10/19/2021 10:30 AM Tammi Loomis APRN [facility] CTS  [facility]       Test Results Pending at Discharge: n/a    Electronically signed by Chepe Lewis MD, 10/06/21, 18:33 CDT.    Time: 45 minutes          Electronically signed by Chepe Lewis MD at 10/06/21 6737

## 2021-10-07 NOTE — OUTREACH NOTE
Prep Survey      Responses   [facility] facility patient discharged from?  [facility]   Is LACE score < 7 ?  No   Emergency Room discharge w/ pulse ox?  No   Eligibility  Readm Mgmt   Discharge diagnosis  pneumothorax    Does the patient have one of the following disease processes/diagnoses(primary or secondary)?  Other   Does the patient have Home health ordered?  No   Is there a DME ordered?  No   Prep survey completed?  Yes          LYNDSEY Russell RN

## 2021-10-09 LAB
BACTERIA SPEC AEROBE CULT: NORMAL
BACTERIA SPEC AEROBE CULT: NORMAL

## 2021-10-12 ENCOUNTER — READMISSION MANAGEMENT (OUTPATIENT)
Dept: CALL CENTER | Facility: HOSPITAL | Age: 21
End: 2021-10-12

## 2021-10-12 NOTE — OUTREACH NOTE
Medical Week 1 Survey      Responses   [facility] patient discharged from? [place]   Does the patient have one of the following disease processes/diagnoses(primary or secondary)? Other   Week 1 attempt successful? No   Unsuccessful attempts Attempt 1          Reina Braswell RN

## 2021-10-14 ENCOUNTER — TELEPHONE (OUTPATIENT)
Dept: CARDIAC SURGERY | Facility: CLINIC | Age: 21
End: 2021-10-14

## 2021-10-14 NOTE — TELEPHONE ENCOUNTER
Mother calling to check status on form she dropped off last week.  Can reach her at #[phone]/sanaz

## 2021-10-18 ENCOUNTER — READMISSION MANAGEMENT (OUTPATIENT)
Dept: CALL CENTER | Facility: HOSPITAL | Age: 21
End: 2021-10-18

## 2021-10-18 NOTE — TELEPHONE ENCOUNTER
Spoke with patient re: this. I explained I should be able to get this to her following her OV tomorrow. She voiced understanding.

## 2021-10-18 NOTE — OUTREACH NOTE
Medical Week 1 Survey      Responses   [facility] patient discharged from? [place]   Does the patient have one of the following disease processes/diagnoses(primary or secondary)? Other   Week 1 attempt successful? No   Unsuccessful attempts Attempt 2          Mandie Bangura RN

## 2021-10-18 NOTE — TELEPHONE ENCOUNTER
LM for pt to return call re: job description. I am needing her job title, normal work schedule, and her essential job functions to complete her paperwork. She has an OV with Dr Isidro tomorrow, and I will most likely be able to provide the form to her following the appt and direction of Dr Isidro as far as return to work date and any weight restrictions needed.

## 2021-10-19 ENCOUNTER — HOSPITAL ENCOUNTER (OUTPATIENT)
Dept: GENERAL RADIOLOGY | Facility: HOSPITAL | Age: 21
Discharge: HOME | End: 2021-10-19
Admitting: NURSE PRACTITIONER

## 2021-10-19 ENCOUNTER — OFFICE VISIT (OUTPATIENT)
Dept: CARDIAC SURGERY | Facility: CLINIC | Age: 21
End: 2021-10-19

## 2021-10-19 VITALS
OXYGEN SATURATION: 99 % | DIASTOLIC BLOOD PRESSURE: 64 MMHG | BODY MASS INDEX: 20.76 KG/M2 | HEIGHT: 70 IN | SYSTOLIC BLOOD PRESSURE: 112 MMHG | WEIGHT: 145 LBS | HEART RATE: 78 BPM

## 2021-10-19 DIAGNOSIS — J93.9 PNEUMOTHORAX, UNSPECIFIED TYPE: ICD-10-CM

## 2021-10-19 DIAGNOSIS — J93.83 OTHER PNEUMOTHORAX: ICD-10-CM

## 2021-10-19 DIAGNOSIS — J98.4 PNEUMATOCELE OF LUNG: Primary | ICD-10-CM

## 2021-10-19 DIAGNOSIS — U07.1 COVID-19 VIRUS INFECTION: ICD-10-CM

## 2021-10-19 PROCEDURE — 99212 OFFICE O/P EST SF 10 MIN: CPT | Performed by: NURSE PRACTITIONER

## 2021-10-19 PROCEDURE — 71046 X-RAY EXAM CHEST 2 VIEWS: CPT

## 2021-10-19 NOTE — PROGRESS NOTES
"Subjective   Chief Complaint   Patient presents with   • Post-op Follow-up     Pt had Bronchoscopy on 9/24     Patient ID: Lamar Rodriguez is a 21 y.o. female who is here for follow-up after recent hospitalization for COVID infection, respiratory failure, pneumothorax and pneumatoceles with right chest tube placement.       History of Present Illness  She was discharged home on 10/6/2021 on linezolid and with right chest tube to heimlich valve. She returns today for follow up. CXR performed prior to office visit. Completed antibiotics a couple days ago. No fevers/sweats/chills. Non productive cough. Feels better and like she can take a good deep breath. Minimal pain near chest tube insertion site. No drainage from chest tube. She is joined by her mom. She is eager to return to work as a  at [employer]. Ambulating well.     The following portions of the patient's history were reviewed and updated as appropriate: allergies, current medications, past family history, past medical history, past social history, past surgical history and problem list.    Review of Systems   Constitutional: Negative for chills, diaphoresis and fever.   Respiratory: Positive for cough (dry cough). Negative for shortness of breath.    Cardiovascular: Negative for chest pain and leg swelling.       Objective   Visit Vitals  /64 (BP Location: Right arm, Patient Position: Sitting, Cuff Size: Adult)   Pulse 78   Ht 177.8 cm (70\")   Wt 65.8 kg (145 lb)   LMP 09/22/2021 (Approximate)   SpO2 99%   BMI 20.81 kg/m²       Physical Exam  Vitals reviewed.   Constitutional:       General: She is not in acute distress.     Appearance: Normal appearance. She is well-developed. She is not diaphoretic.   HENT:      Head: Normocephalic.   Eyes:      Pupils: Pupils are equal, round, and reactive to light.   Neck:      Vascular: No JVD.   Cardiovascular:      Rate and Rhythm: Normal rate and regular rhythm.      Heart sounds: Normal heart sounds. No " murmur heard.  No friction rub.   Pulmonary:      Effort: Pulmonary effort is normal. No respiratory distress.      Breath sounds: Normal breath sounds. No stridor. No wheezing or rales.   Abdominal:      General: There is no distension.      Palpations: Abdomen is soft.      Tenderness: There is no abdominal tenderness.   Musculoskeletal:      Cervical back: Normal range of motion and neck supple.      Right lower leg: No edema.      Left lower leg: No edema.   Skin:     General: Skin is warm and dry.      Coloration: Skin is not pale.      Findings: No erythema or rash.      Comments: Right chest tube in place to heimlich valve. Site clean and dry. Migrated tube back to landmark of 10 cm. Resecured with existing suture with suitable skin purchase. Patient tolerated well.    Neurological:      Mental Status: She is alert and oriented to person, place, and time.      Cranial Nerves: No cranial nerve deficit.   Psychiatric:         Behavior: Behavior normal.         XR Chest 2 View    Result Date: 10/19/2021  Narrative: EXAMINATION: XR CHEST 2 VW- 10/19/2021 10:30 AM CDT  HISTORY: right chest tube; J93.9-Pneumothorax, unspecified.  REPORT: Frontal and lateral views of the chest were obtained.  COMPARISON: Chest x-rays 10/6/2021.  There is volume loss in the right, with near complete resolution of linear infiltrate at the right base. The right chest tube appears in satisfactory position. No pneumothorax is identified. The left lung remains clear. Heart size is normal. There is mild elevation of the right hemidiaphragm. The osseous structures are unremarkable.      Impression: Stable satisfactory position of the right chest tube, no pneumothorax or pleural effusion is identified. There is near complete resolution of linear infiltrate seen in the right lung base. Some residual atelectasis or underlying scarring is likely. This report was finalized on 10/19/2021 10:32 by Dr. Yung Diaz MD.        Assessment/Plan      Independent Review of Radiographic Studies:    Right chest tube in place, greatly improved right basilar and lateral infiltrate, no pneumothorax, improved aeration of right lung     Diagnoses and all orders for this visit:    1. Pneumatocele of lung (Primary)  -     CT chest w contrast; Future    2. Other pneumothorax  -     CT chest w contrast; Future    3. COVID-19 virus infection  -     CT chest w contrast; Future           Overall, Lamar Rodriguez is doing well. She is off antibiotics. Chest tube migrated out to landmark of 10 cm. Plan to follow up in 1 week with CT scan of the chest and potentially remove chest tube at that time. Discussed potential return to work in 2-3 weeks after chest tube out and site is closed over and healing. Provided support and encouragement. All questions have been answered to the best of my ability. Patient has been instructed to contact our office with any questions or concerns should they arise prior to the next office visit.     Patient's Body mass index is 20.81 kg/m². indicating that she is within normal range (BMI 18.5-24.9). No BMI management plan needed..     Lamar Rodriguez  reports that she has never smoked. She has never used smokeless tobacco.       Advance Care Planning   N/A as patient is under 63 years of age.

## 2021-10-22 ENCOUNTER — TELEPHONE (OUTPATIENT)
Dept: CARDIAC SURGERY | Facility: CLINIC | Age: 21
End: 2021-10-22

## 2021-10-22 ENCOUNTER — HOSPITAL ENCOUNTER (OUTPATIENT)
Dept: GENERAL RADIOLOGY | Facility: HOSPITAL | Age: 21
Discharge: HOME | End: 2021-10-22
Admitting: NURSE PRACTITIONER

## 2021-10-22 DIAGNOSIS — J98.4 PNEUMATOCELE OF LUNG: Primary | ICD-10-CM

## 2021-10-22 DIAGNOSIS — J93.83 OTHER PNEUMOTHORAX: ICD-10-CM

## 2021-10-22 DIAGNOSIS — J98.4 PNEUMATOCELE OF LUNG: ICD-10-CM

## 2021-10-22 PROCEDURE — 71046 X-RAY EXAM CHEST 2 VIEWS: CPT

## 2021-10-22 NOTE — TELEPHONE ENCOUNTER
Pt calling stating that she has a chest tube in and the top of her chest hurts really bad upon inhale. Please follow up with patient. She did not specify if this is a new pain, but she was concerned about it and just wanted to make sure if this was normal or if she needs to be seen. Pt can be reached at [phone].

## 2021-10-22 NOTE — TELEPHONE ENCOUNTER
Reviewed chest x ray. No pneumothorax. Called patient to discuss. States she has right chest pain today-this is new and has gotten worse. No drainage of remark. Offered pain medication but she declined this. Advised to take tylenol and ibuprofen but if it gets worse to call our office back. Explained that I do not have a good explanation for this right chest pain but could be from chest tube up against from chest wall. She verbalizes understanding and feels relief to know lung is not collapsed. Keep follow up on wed with ct scan chest.

## 2021-10-22 NOTE — TELEPHONE ENCOUNTER
Pt calling stating that she has a chest tube in and the top of her chest hurts really bad upon inhale. Please follow up with patient. She did not specify if this is a new pain, but she was concerned about it and just wanted to make sure if this was normal or if she needs to be seen. Pt can be reached at [phone].

## 2021-10-22 NOTE — TELEPHONE ENCOUNTER
Per Tammi SALGADO, pt is to come in now for Chest Xray. I called and notified patient and she voiced understanding and is coming in now.

## 2021-10-27 ENCOUNTER — HOSPITAL ENCOUNTER (OUTPATIENT)
Dept: GENERAL RADIOLOGY | Facility: HOSPITAL | Age: 21
Discharge: HOME | End: 2021-10-27

## 2021-10-27 ENCOUNTER — OFFICE VISIT (OUTPATIENT)
Dept: CARDIAC SURGERY | Facility: CLINIC | Age: 21
End: 2021-10-27

## 2021-10-27 ENCOUNTER — HOSPITAL ENCOUNTER (OUTPATIENT)
Dept: CT IMAGING | Facility: HOSPITAL | Age: 21
Discharge: HOME | End: 2021-10-27

## 2021-10-27 VITALS
WEIGHT: 145.2 LBS | DIASTOLIC BLOOD PRESSURE: 69 MMHG | HEART RATE: 65 BPM | HEIGHT: 70 IN | OXYGEN SATURATION: 99 % | BODY MASS INDEX: 20.79 KG/M2 | SYSTOLIC BLOOD PRESSURE: 118 MMHG

## 2021-10-27 DIAGNOSIS — U07.1 COVID-19 VIRUS INFECTION: ICD-10-CM

## 2021-10-27 DIAGNOSIS — J93.83 OTHER PNEUMOTHORAX: ICD-10-CM

## 2021-10-27 DIAGNOSIS — J98.4 PNEUMATOCELE OF LUNG: ICD-10-CM

## 2021-10-27 DIAGNOSIS — J98.4 PNEUMATOCELE OF LUNG: Primary | ICD-10-CM

## 2021-10-27 LAB — CREAT BLDA-MCNC: 0.3 MG/DL (ref 0.6–1.3)

## 2021-10-27 PROCEDURE — 71260 CT THORAX DX C+: CPT

## 2021-10-27 PROCEDURE — 99212 OFFICE O/P EST SF 10 MIN: CPT | Performed by: NURSE PRACTITIONER

## 2021-10-27 PROCEDURE — 25010000002 IOPAMIDOL 61 % SOLUTION: Performed by: NURSE PRACTITIONER

## 2021-10-27 PROCEDURE — 71046 X-RAY EXAM CHEST 2 VIEWS: CPT

## 2021-10-27 PROCEDURE — 82565 ASSAY OF CREATININE: CPT

## 2021-10-27 RX ADMIN — IOPAMIDOL 100 ML: 612 INJECTION, SOLUTION INTRAVENOUS at 09:35

## 2021-10-27 NOTE — PROGRESS NOTES
"Subjective   Chief Complaint   Patient presents with   • Pneumatocele of lung     Pt is here for follow up w/CT      Patient ID: Lamar Rodriguez is a 21 y.o. female who is here for follow-up after recent hospitalization for COVID infection, respiratory failure, pneumothorax and pneumatoceles with right chest tube placement.       History of Present Illness  She was discharged home on 10/6/2021 on linezolid and with right chest tube to heimlich valve. She returns today for follow up. She is joined by her mother. CT scan of the chest was performed prior to this office visit with improvement noted. She remains off antibiotics. No fevers/sweats/chills. No cough. Only complaint is mild pain occasionally. No drainage from chest tube site. She is eager to return to work as a  at [employer]. Ambulating well.     The following portions of the patient's history were reviewed and updated as appropriate: allergies, current medications, past family history, past medical history, past social history, past surgical history and problem list.    Review of Systems   Constitutional: Negative for chills, diaphoresis and fever.   Respiratory: Negative for cough and shortness of breath.    Cardiovascular: Negative for chest pain and leg swelling.       Objective   Visit Vitals  /69 (BP Location: Right arm, Patient Position: Sitting, Cuff Size: Adult)   Pulse 65   Ht 177.8 cm (70\")   Wt 65.9 kg (145 lb 3.2 oz)   SpO2 99%   BMI 20.83 kg/m²       Physical Exam  Vitals reviewed.   Constitutional:       General: She is not in acute distress.     Appearance: Normal appearance. She is well-developed. She is not diaphoretic.   HENT:      Head: Normocephalic.   Eyes:      Pupils: Pupils are equal, round, and reactive to light.   Neck:      Vascular: No JVD.   Cardiovascular:      Rate and Rhythm: Normal rate and regular rhythm.      Heart sounds: Normal heart sounds. No murmur heard.  No friction rub.   Pulmonary:      Effort: Pulmonary " effort is normal. No respiratory distress.      Breath sounds: Normal breath sounds. No stridor. No wheezing or rales.   Abdominal:      General: There is no distension.      Palpations: Abdomen is soft.      Tenderness: There is no abdominal tenderness.   Musculoskeletal:      Cervical back: Normal range of motion and neck supple.      Right lower leg: No edema.      Left lower leg: No edema.   Skin:     General: Skin is warm and dry.      Coloration: Skin is not pale.      Findings: No erythema or rash.      Comments: Right chest tube in place to heimlich valve. Site clean and dry. Chest tube removed without remark and new dressing applied. Patient tolerated well.    Neurological:      Mental Status: She is alert and oriented to person, place, and time.      Cranial Nerves: No cranial nerve deficit.   Psychiatric:         Behavior: Behavior normal.       CT Chest With Contrast Diagnostic    Result Date: 10/27/2021  1. Previously noted fluid collection within the right upper lobe has resolved post thoracostomy tube placement. There is a residual air-filled cavity which is thin-walled. Additional areas of air-filled cystic change are noted within the right upper lobe. There is pleural reaction as well as some adjacent lung reaction adjacent to the thoracostomy tube within the anterior right upper chest. 2. Previously noted rather extensive consolidation within the right lung shows interval improvement. There is residual linear scarring with developing traction bronchiectasis. There is also developing scarring with traction bronchiectasis within the inferior lingular segment of the left upper lobe. No new consolidative pneumonia is present..   This report was finalized on 10/27/2021 10:33 by Dr. Dayron Lobo MD.    CT Chest With Contrast Diagnostic    Result Date: 10/4/2021  1. Parenchymal gas and fluid collection at the right upper lobe has increased in size, now 5.7 x 3.5 cm, previously 3.9 x 3.2 cm on  9/20/2021. 2. Other cavitary lesions are now thin walled and gas-filled rather than thick-walled with gas and fluid. 3. Small right pleural effusion with right-sided chest tube. Linear bibasilar atelectasis and patchy opacities. 5. Mildly fatty liver. This report was finalized on 10/04/2021 13:40 by Dr Kala Whelan MD.      Assessment/Plan     Independent Review of Radiographic Studies:    Right chest tube in place, greatly improved right upper lobe pneumatocele and consolidations     Diagnoses and all orders for this visit:    1. Pneumatocele of lung (Primary)  -     XR Chest 2 View; Future    2. Other pneumothorax  -     XR Chest 2 View; Future    3. COVID-19 virus infection         Overall, Lamar Rodriguez is doing well. Chest tube removed without remark. Discussed wound care. Plan to obtain chest x ray today to establish new baseline after chest tube removal, follow up in 1 week with CXR and to see if chest tube site has healed over for potential return to work, then follow up in 5 weeks with Dr. Isidro and with chest x ray. Provided support and encouragement. All questions have been answered to the best of my ability. Patient has been instructed to contact our office with any questions or concerns should they arise prior to the next office visit. RTC in 1 week with CXR.     Patient's Body mass index is 20.83 kg/m². indicating that she is within normal range (BMI 18.5-24.9). No BMI management plan needed..     Lamar Rodriguez  reports that she has never smoked. She has never used smokeless tobacco.       Advance Care Planning   N/A as patient is under 63 years of age.

## 2021-11-03 ENCOUNTER — OFFICE VISIT (OUTPATIENT)
Dept: CARDIAC SURGERY | Facility: CLINIC | Age: 21
End: 2021-11-03

## 2021-11-03 ENCOUNTER — HOSPITAL ENCOUNTER (OUTPATIENT)
Dept: GENERAL RADIOLOGY | Facility: HOSPITAL | Age: 21
Discharge: HOME | End: 2021-11-03
Admitting: NURSE PRACTITIONER

## 2021-11-03 VITALS
HEART RATE: 68 BPM | HEIGHT: 70 IN | BODY MASS INDEX: 20.76 KG/M2 | OXYGEN SATURATION: 100 % | DIASTOLIC BLOOD PRESSURE: 72 MMHG | WEIGHT: 145 LBS | SYSTOLIC BLOOD PRESSURE: 120 MMHG

## 2021-11-03 DIAGNOSIS — J93.83 OTHER PNEUMOTHORAX: ICD-10-CM

## 2021-11-03 DIAGNOSIS — U07.1 COVID-19 VIRUS INFECTION: ICD-10-CM

## 2021-11-03 DIAGNOSIS — J98.4 PNEUMATOCELE OF LUNG: Primary | ICD-10-CM

## 2021-11-03 DIAGNOSIS — J98.4 PNEUMATOCELE OF LUNG: ICD-10-CM

## 2021-11-03 PROCEDURE — 71046 X-RAY EXAM CHEST 2 VIEWS: CPT

## 2021-11-03 PROCEDURE — 99212 OFFICE O/P EST SF 10 MIN: CPT | Performed by: NURSE PRACTITIONER

## 2021-11-03 NOTE — PROGRESS NOTES
"Subjective   Chief Complaint   Patient presents with   • Pneumatocele of lung     Pt is here for follow up w/CXR     Patient ID: Lamar Rodriguez is a 21 y.o. female who is here for follow-up after recent hospitalization for COVID infection, respiratory failure, pneumothorax and pneumatoceles with right chest tube placement.       History of Present Illness  She returns today for follow up with chest x ray after chest tube removal last week. She is eager to go back to work as a  at [employer]. No fevers, sweats, or chills. Completed antibiotic therapy. No cough or shortness of breath. No pain. Ambulating independently without remark.     The following portions of the patient's history were reviewed and updated as appropriate: allergies, current medications, past family history, past medical history, past social history, past surgical history and problem list.    Review of Systems   Constitutional: Negative for chills, diaphoresis and fever.   Respiratory: Negative for cough and shortness of breath.    Cardiovascular: Negative for chest pain and leg swelling.       Objective   Visit Vitals  /72 (BP Location: Right arm, Patient Position: Sitting, Cuff Size: Adult)   Pulse 68   Ht 177.8 cm (70\")   Wt 65.8 kg (145 lb)   SpO2 100%   BMI 20.81 kg/m²       Physical Exam  Vitals reviewed.   Constitutional:       General: She is not in acute distress.     Appearance: Normal appearance. She is well-developed. She is not diaphoretic.   HENT:      Head: Normocephalic.   Eyes:      Pupils: Pupils are equal, round, and reactive to light.   Neck:      Vascular: No JVD.   Cardiovascular:      Rate and Rhythm: Normal rate and regular rhythm.      Heart sounds: Normal heart sounds. No murmur heard.  No friction rub.   Pulmonary:      Effort: Pulmonary effort is normal. No respiratory distress.      Breath sounds: Normal breath sounds. No stridor. No wheezing or rales.   Abdominal:      General: There is no distension.      " Palpations: Abdomen is soft.      Tenderness: There is no abdominal tenderness.   Musculoskeletal:      Cervical back: Normal range of motion and neck supple.      Right lower leg: No edema.      Left lower leg: No edema.   Skin:     General: Skin is warm and dry.      Coloration: Skin is not pale.      Findings: No erythema or rash.      Comments: Previous right chest tube site healing. No drainage. Skin edges closed over.     Neurological:      Mental Status: She is alert and oriented to person, place, and time.      Cranial Nerves: No cranial nerve deficit.   Psychiatric:         Behavior: Behavior normal.       EXAMINATION: XR CHEST 2 VW-     11/3/2021 1:33 PM CDT     HISTORY: pneumatocele, pneumothorax post chest tube removal; J98.4-Other  disorders of lung; J93.83-Other pneumothorax     2 view chest x-ray.     Comparison is made with a two-view chest x-ray and chest CT from October 27, 2021.     Small right upper lobe cyst or pneumatocele present.  Perihilar and lower lobe interstitial prominence persists though is  decreased.     No pneumothorax or pleural effusion.     Normal heart size.     Summary:  1. Residual interstitial disease.  2. Small cyst or pneumatocele at the right upper lobe continues to  decrease in size.  This report was finalized on 11/03/2021 14:58 by Dr. Cayden Pillai MD.      Assessment/Plan     Independent Review of Radiographic Studies:    No pneumothorax, interstitial changes, right upper lobe pneumatocele      Diagnoses and all orders for this visit:    1. Pneumatocele of lung (Primary)  -     XR Chest 2 View; Future    2. Other pneumothorax  -     XR Chest 2 View; Future    3. COVID-19 virus infection         Overall, Lamar Rodriguez is doing well. Chest x ray stable. Ok to return to work with 10 pound weight lifting restrictions. Cautioned to keep previous chest tube site clean, dry and covered while at work. Letter to return to work provided to patient. Follow up in 1 month with  repeat chest x ray for appointment with Dr. Isidro. Provided support and encouragement. All questions have been answered to the best of my ability. Patient has been instructed to contact our office with any questions or concerns should they arise prior to the next office visit. RTC in 1 month with CXR     Patient's Body mass index is 20.81 kg/m². indicating that she is within normal range (BMI 18.5-24.9). No BMI management plan needed..     Lamar Rodriguez  reports that she has never smoked. She has never used smokeless tobacco.       Advance Care Planning   N/A as patient is under 65 years of age.

## 2021-11-05 LAB — FUNGUS WND CULT: NORMAL

## 2021-12-01 ENCOUNTER — HOSPITAL ENCOUNTER (OUTPATIENT)
Dept: GENERAL RADIOLOGY | Facility: HOSPITAL | Age: 21
Discharge: HOME | End: 2021-12-01
Admitting: NURSE PRACTITIONER

## 2021-12-01 ENCOUNTER — OFFICE VISIT (OUTPATIENT)
Dept: CARDIAC SURGERY | Facility: CLINIC | Age: 21
End: 2021-12-01

## 2021-12-01 VITALS
OXYGEN SATURATION: 100 % | DIASTOLIC BLOOD PRESSURE: 54 MMHG | HEIGHT: 70 IN | SYSTOLIC BLOOD PRESSURE: 110 MMHG | WEIGHT: 148.6 LBS | HEART RATE: 88 BPM | BODY MASS INDEX: 21.27 KG/M2

## 2021-12-01 DIAGNOSIS — J98.4 PNEUMATOCELE OF LUNG: ICD-10-CM

## 2021-12-01 DIAGNOSIS — U07.1 COVID-19 VIRUS INFECTION: Primary | ICD-10-CM

## 2021-12-01 DIAGNOSIS — J93.83 OTHER PNEUMOTHORAX: ICD-10-CM

## 2021-12-01 PROCEDURE — 71046 X-RAY EXAM CHEST 2 VIEWS: CPT

## 2021-12-01 PROCEDURE — 99213 OFFICE O/P EST LOW 20 MIN: CPT | Performed by: THORACIC SURGERY (CARDIOTHORACIC VASCULAR SURGERY)

## 2021-12-02 DIAGNOSIS — J98.4 PNEUMATOCELE OF LUNG: Primary | ICD-10-CM

## 2021-12-02 DIAGNOSIS — Z01.812 ENCOUNTER FOR PREPROCEDURE SCREENING LABORATORY TESTING FOR COVID-19: ICD-10-CM

## 2021-12-02 DIAGNOSIS — Z20.822 ENCOUNTER FOR PREPROCEDURE SCREENING LABORATORY TESTING FOR COVID-19: ICD-10-CM

## 2021-12-18 ENCOUNTER — LAB (OUTPATIENT)
Dept: LAB | Facility: HOSPITAL | Age: 21
End: 2021-12-18

## 2021-12-18 DIAGNOSIS — Z20.822 ENCOUNTER FOR PREPROCEDURE SCREENING LABORATORY TESTING FOR COVID-19: ICD-10-CM

## 2021-12-18 DIAGNOSIS — Z01.812 ENCOUNTER FOR PREPROCEDURE SCREENING LABORATORY TESTING FOR COVID-19: ICD-10-CM

## 2021-12-18 LAB — SARS-COV-2 ORF1AB RESP QL NAA+PROBE: NOT DETECTED

## 2021-12-18 PROCEDURE — C9803 HOPD COVID-19 SPEC COLLECT: HCPCS

## 2021-12-18 PROCEDURE — U0004 COV-19 TEST NON-CDC HGH THRU: HCPCS

## 2021-12-20 NOTE — PROGRESS NOTES
"Subjective   Chief Complaint   Patient presents with   • Follow-up     Pneumatocele follow up. CXR on file     Patient ID: Lamar Rodriguez is a 21 y.o. female who is here for follow-up after recent hospitalization for COVID infection, respiratory failure, pneumothorax and pneumatoceles with right chest tube placement.       History of Present Illness  She returns today for follow up with chest x ray.  As an outpatient she had been seen by Tammi Loomis.  She has remained off of antibiotics.  She has no shortness of breath.  She has no cough.  No chest pain.  Her exercise stamina is now back to nearly baseline.    She denies unintended weight loss, hoarseness of voice, chest pain, hemoptysis, history of pneumonia, or cough.    The following portions of the patient's history were reviewed and updated as appropriate: allergies, current medications, past family history, past medical history, past social history, past surgical history and problem list.    Review of Systems   Constitutional: Negative for chills, diaphoresis and fever.   Respiratory: Negative for cough and shortness of breath.    Cardiovascular: Negative for chest pain and leg swelling.       Objective   Visit Vitals  /54 (BP Location: Right arm, Patient Position: Sitting, Cuff Size: Adult)   Pulse 88   Ht 177.8 cm (70\")   Wt 67.4 kg (148 lb 9.6 oz)   SpO2 100%   BMI 21.32 kg/m²       Physical Exam  Vitals reviewed.   Constitutional:       General: She is not in acute distress.     Appearance: Normal appearance. She is well-developed. She is not diaphoretic.   HENT:      Head: Normocephalic.   Eyes:      Pupils: Pupils are equal, round, and reactive to light.   Neck:      Vascular: No JVD.   Cardiovascular:      Rate and Rhythm: Normal rate and regular rhythm.      Heart sounds: Normal heart sounds. No murmur heard.  No friction rub.   Pulmonary:      Effort: Pulmonary effort is normal. No respiratory distress.      Breath sounds: Normal breath sounds. " No stridor. No wheezing or rales.   Abdominal:      General: There is no distension.      Palpations: Abdomen is soft.      Tenderness: There is no abdominal tenderness.   Musculoskeletal:      Cervical back: Normal range of motion and neck supple.      Right lower leg: No edema.      Left lower leg: No edema.   Skin:     General: Skin is warm and dry.      Coloration: Skin is not pale.      Findings: No erythema or rash.      Comments: Previous right chest tube site healing. No drainage. Skin edges closed over.     Neurological:      Mental Status: She is alert and oriented to person, place, and time.      Cranial Nerves: No cranial nerve deficit.   Psychiatric:         Behavior: Behavior normal.       EXAMINATION: XR CHEST 2 VW-     11/3/2021 1:33 PM CDT     HISTORY: pneumatocele, pneumothorax post chest tube removal; J98.4-Other  disorders of lung; J93.83-Other pneumothorax     2 view chest x-ray.     Comparison is made with a two-view chest x-ray and chest CT from October 27, 2021.     Small right upper lobe cyst or pneumatocele present.  Perihilar and lower lobe interstitial prominence persists though is  decreased.     No pneumothorax or pleural effusion.     Normal heart size.     Summary:  1. Residual interstitial disease.  2. Small cyst or pneumatocele at the right upper lobe continues to  decrease in size.  This report was finalized on 11/03/2021 14:58 by Dr. Cayden Pillai MD.    Study Result    Narrative & Impression   EXAM: XR CHEST 2 VW-     INDICATION: pneumothorax, pneumatocele, history of covid infection;  J98.4-Other disorders of lung; J93.83-Other pneumothorax     COMPARISON: 11/3/2021     FINDINGS:     Cardiomediastinal silhouette is within normal limits. No pleural  effusion, pneumothorax, or focal consolidation. No acute osseous  findings.     IMPRESSION:     No evidence of pneumothorax.  This report was finalized on 12/01/2021 15:07 by Dr. Gonzalo Burk MD.           Assessment/Plan      Independent Review of Radiographic Studies:    No pneumothorax, interstitial changes, apparent resolution of right upper lobe pneumatocele, no pleural effusion.  No focal consolidation     Diagnoses and all orders for this visit:    1. COVID-19 virus infection (Primary)    2. Pneumatocele of lung    3. Other pneumothorax         Overall, Lamar Rodriguez is doing well.  I am extremely pleased with her radiograph.  I believe she can continue to to perform her activities of the living without any further restriction.  No specific restrictions long-term.  Since she has no dyspnea I do not think pulmonary function tests are warranted especially since she is a non-smoker.      Patient's Body mass index is 21.32 kg/m². indicating that she is within normal range (BMI 18.5-24.9). No BMI management plan needed..     Lamar Rodriguez  reports that she has never smoked. She has never used smokeless tobacco.       Advance Care Planning   N/A as patient is under 63 years of age.     Although I have not given her a specific return to clinic appointment, should I be of any further assist in the future, please do not hesitate to contact me.  Many thanks for the opportunity to care for your patient.

## 2021-12-21 ENCOUNTER — HOSPITAL ENCOUNTER (OUTPATIENT)
Dept: PULMONOLOGY | Facility: HOSPITAL | Age: 21
Discharge: HOME | End: 2021-12-21
Admitting: NURSE PRACTITIONER

## 2021-12-21 DIAGNOSIS — J98.4 PNEUMATOCELE OF LUNG: ICD-10-CM

## 2021-12-21 LAB
ARTERIAL PATENCY WRIST A: POSITIVE
ATMOSPHERIC PRESS: 748 MMHG
BASE EXCESS BLDA CALC-SCNC: 0.3 MMOL/L (ref 0–2)
BDY SITE: NORMAL
BODY TEMPERATURE: 37 C
HCO3 BLDA-SCNC: 24.3 MMOL/L (ref 20–26)
Lab: NORMAL
MODALITY: NORMAL
PCO2 BLDA: 36.4 MM HG (ref 35–45)
PCO2 TEMP ADJ BLD: 36.4 MM HG (ref 35–45)
PH BLDA: 7.43 PH UNITS (ref 7.35–7.45)
PH, TEMP CORRECTED: 7.43 PH UNITS (ref 7.35–7.45)
PO2 BLDA: 98.3 MM HG (ref 83–108)
PO2 TEMP ADJ BLD: 98.3 MM HG (ref 83–108)
SAO2 % BLDCOA: 98.2 % (ref 94–99)
VENTILATOR MODE: NORMAL

## 2021-12-21 PROCEDURE — 36600 WITHDRAWAL OF ARTERIAL BLOOD: CPT

## 2021-12-21 PROCEDURE — 94729 DIFFUSING CAPACITY: CPT | Performed by: INTERNAL MEDICINE

## 2021-12-21 PROCEDURE — 94726 PLETHYSMOGRAPHY LUNG VOLUMES: CPT | Performed by: INTERNAL MEDICINE

## 2021-12-21 PROCEDURE — 94060 EVALUATION OF WHEEZING: CPT | Performed by: INTERNAL MEDICINE

## 2021-12-21 PROCEDURE — 94060 EVALUATION OF WHEEZING: CPT

## 2021-12-21 PROCEDURE — 94726 PLETHYSMOGRAPHY LUNG VOLUMES: CPT

## 2021-12-21 PROCEDURE — 82803 BLOOD GASES ANY COMBINATION: CPT

## 2021-12-21 PROCEDURE — 94729 DIFFUSING CAPACITY: CPT

## 2021-12-21 RX ORDER — ALBUTEROL SULFATE 2.5 MG/3ML
2.5 SOLUTION RESPIRATORY (INHALATION) ONCE
Status: COMPLETED | OUTPATIENT
Start: 2021-12-21 | End: 2021-12-21

## 2021-12-21 RX ADMIN — ALBUTEROL SULFATE 2.5 MG: 2.5 SOLUTION RESPIRATORY (INHALATION) at 15:40

## 2021-12-22 ENCOUNTER — TELEPHONE (OUTPATIENT)
Dept: CARDIAC SURGERY | Facility: CLINIC | Age: 21
End: 2021-12-22

## 2021-12-22 NOTE — TELEPHONE ENCOUNTER
I attempted to call her at both numbers listed in the chart but no answer. Left message to return call. If she calls back tell her that her pulmonary function tests (breathing tests) are good and there is nothing more needed to be done at this time. Continue to follow up with her PCP.

## 2022-03-21 NOTE — PLAN OF CARE
Problem: Patient Care Overview  Goal: Plan of Care Review  Outcome: Ongoing (interventions implemented as appropriate)   03/23/18 0201   Coping/Psychosocial   Plan of Care Reviewed With patient   Plan of Care Review   Progress improving   OTHER   Outcome Summary vitals stable, fundus and lochia wnl, refused flu and pneumonia vaccine and still deciding on recieving the tdap, mild maliha swelling using ice packs, po pain meds controlled pain, bonding well with infant     Goal: Individualization and Mutuality  Outcome: Ongoing (interventions implemented as appropriate)    Goal: Discharge Needs Assessment  Outcome: Ongoing (interventions implemented as appropriate)    Goal: Interprofessional Rounds/Family Conf  Outcome: Ongoing (interventions implemented as appropriate)         No

## 2022-12-28 ENCOUNTER — OFFICE VISIT (OUTPATIENT)
Dept: OBSTETRICS AND GYNECOLOGY | Facility: CLINIC | Age: 22
End: 2022-12-28

## 2022-12-28 VITALS
WEIGHT: 151 LBS | DIASTOLIC BLOOD PRESSURE: 72 MMHG | HEIGHT: 70 IN | BODY MASS INDEX: 21.62 KG/M2 | SYSTOLIC BLOOD PRESSURE: 118 MMHG

## 2022-12-28 DIAGNOSIS — Z30.011 ENCOUNTER FOR INITIAL PRESCRIPTION OF CONTRACEPTIVE PILLS: ICD-10-CM

## 2022-12-28 DIAGNOSIS — Z01.419 ENCOUNTER FOR GYNECOLOGICAL EXAMINATION WITHOUT ABNORMAL FINDING: Primary | ICD-10-CM

## 2022-12-28 DIAGNOSIS — Z12.4 SCREENING FOR CERVICAL CANCER: ICD-10-CM

## 2022-12-28 LAB
B-HCG UR QL: NEGATIVE
EXPIRATION DATE: NORMAL
INTERNAL NEGATIVE CONTROL: NORMAL
INTERNAL POSITIVE CONTROL: NORMAL
Lab: NORMAL

## 2022-12-28 PROCEDURE — G0123 SCREEN CERV/VAG THIN LAYER: HCPCS | Performed by: OBSTETRICS & GYNECOLOGY

## 2022-12-28 PROCEDURE — 99395 PREV VISIT EST AGE 18-39: CPT | Performed by: OBSTETRICS & GYNECOLOGY

## 2022-12-28 PROCEDURE — 81025 URINE PREGNANCY TEST: CPT | Performed by: OBSTETRICS & GYNECOLOGY

## 2022-12-28 RX ORDER — NORETHINDRONE ACETATE AND ETHINYL ESTRADIOL AND FERROUS FUMARATE 1MG-20(24)
1 KIT ORAL DAILY
Qty: 28 TABLET | Refills: 3 | Status: SHIPPED | OUTPATIENT
Start: 2022-12-28 | End: 2023-04-19

## 2022-12-28 NOTE — PROGRESS NOTES
Subjective   Lamar Rodriguez is a 22 y.o. female  YOB: 2000        Chief Complaint   Patient presents with   • Gynecologic Exam     Patient here for yearly exam. Patient does not believe she has had pap done before. Patient would like to discuss birth control options today to help regulate and lighten periods.        22 year old female  LMP 2022 presents for annual examination. Patient reports overall she is doing well. She reports that her cycles are occurring monthly and her bleeding is lasting five to seven days. She is unsure when she had her last pap smear. Her medical and surgical history are up to date. She reports occasional ETOH. She is sexually active with one partner using condoms. She denies family history of breast, ovarian or endometrial cancer.       No Known Allergies    Past Medical History:   Diagnosis Date   • Chlamydia     prior to pregnancy       Family History   Problem Relation Age of Onset   • Prostate cancer Maternal Grandfather        Social History     Socioeconomic History   • Marital status: Single   Tobacco Use   • Smoking status: Never   • Smokeless tobacco: Never   Substance and Sexual Activity   • Alcohol use: No     Comment: On occasion   • Drug use: No   • Sexual activity: Yes     Partners: Male         Current Outpatient Medications:   •  norethindrone-ethinyl estradiol-ferrous fumarate (LOESTIN 24 FE) 1-20 MG-MCG(24) per tablet, Take 1 tablet by mouth Daily for 112 days., Disp: 28 tablet, Rfl: 3    Patient's last menstrual period was 2022 (exact date).    Sexual History:         Could not be calculated    Past Surgical History:   Procedure Laterality Date   • BRONCHOSCOPY N/A 2021    Procedure: BRONCHOSCOPY;  Surgeon: Jarret Isidro MD;  Location: [facility] OR;  Service: Cardiothoracic;  Laterality: N/A;   • BRONCHOSCOPY N/A 2021    Procedure: THERAPEUTIC BRONCHOSCOPY WITH WASHOUT;  Surgeon: Jarret Isidro MD;  Location: [facility] OR;   Service: Cardiothoracic;  Laterality: N/A;   • MOUTH SURGERY         Review of Systems   Constitutional: Negative for activity change and unexpected weight loss.   HENT: Negative for congestion.    Cardiovascular: Negative for chest pain.   Gastrointestinal: Negative for blood in stool, constipation and diarrhea.   Endocrine: Negative for cold intolerance and heat intolerance.   Genitourinary: Negative for dyspareunia, pelvic pain and vaginal discharge.   Musculoskeletal: Negative for arthralgias, back pain, neck pain and neck stiffness.   Skin: Negative for rash.   Neurological: Negative for dizziness and headache.   Psychiatric/Behavioral: Negative for sleep disturbance. The patient is not nervous/anxious.        Objective   Physical Exam  Vitals and nursing note reviewed. Exam conducted with a chaperone present.   Constitutional:       General: She is not in acute distress.     Appearance: She is well-developed.   HENT:      Head: Normocephalic and atraumatic.   Cardiovascular:      Rate and Rhythm: Normal rate and regular rhythm.      Heart sounds: No murmur heard.  Pulmonary:      Effort: Pulmonary effort is normal.      Breath sounds: Normal breath sounds.   Chest:   Breasts:     Right: No inverted nipple or mass.      Left: No inverted nipple or mass.   Abdominal:      General: There is no distension.      Palpations: Abdomen is soft.      Tenderness: There is no abdominal tenderness.   Genitourinary:     Exam position: Supine.      Labia:         Right: No tenderness or lesion.         Left: No tenderness or lesion.       Vagina: Normal. No vaginal discharge, tenderness or bleeding.      Cervix: No cervical motion tenderness, discharge or friability.      Adnexa:         Right: No tenderness or fullness.          Left: No tenderness or fullness.     Musculoskeletal:         General: Normal range of motion.      Cervical back: Normal range of motion and neck supple.   Skin:     General: Skin is warm and dry.  "  Neurological:      Mental Status: She is alert and oriented to person, place, and time.   Psychiatric:         Behavior: Behavior normal.         Judgment: Judgment normal.           Vitals:    12/28/22 0932   BP: 118/72   Weight: 68.5 kg (151 lb)   Height: 177.8 cm (70\")       Diagnoses and all orders for this visit:    1. Encounter for gynecological examination without abnormal finding (Primary)    2. Encounter for initial prescription of contraceptive pills  -     POC Pregnancy, Urine  -     norethindrone-ethinyl estradiol-ferrous fumarate (LOESTIN 24 FE) 1-20 MG-MCG(24) per tablet; Take 1 tablet by mouth Daily for 112 days.  Dispense: 28 tablet; Refill: 3    3. Screening for cervical cancer  -     Liquid-based Pap Smear, Screening    Normal GYN exam.  Pap smear collected today with results to follow.  Discussed with patient different management options for contraception.  Patient interested in combined oral contraceptives.  Discussed with patient risk benefits.  Increased risk for blood clots discussed.  Safe sexual practices discussed.  Offered patient STD testing.  Patient declined.  Off.  Patient Gardasil vaccination.  Patient declined.  Return to clinic in 3 months for follow-up or sooner symptoms worsen.  All questions answered patient verbalized understanding of plan.    Sherice Chinchilla, DO       "

## 2022-12-30 LAB
GEN CATEG CVX/VAG CYTO-IMP: NORMAL
LAB AP CASE REPORT: NORMAL
LAB AP GYN ADDITIONAL INFORMATION: NORMAL
LAB AP GYN OTHER FINDINGS: NORMAL
Lab: NORMAL
PATH INTERP SPEC-IMP: NORMAL
STAT OF ADQ CVX/VAG CYTO-IMP: NORMAL

## 2023-10-06 ENCOUNTER — TELEPHONE (OUTPATIENT)
Dept: OBSTETRICS AND GYNECOLOGY | Facility: CLINIC | Age: 23
End: 2023-10-06
Payer: COMMERCIAL

## 2023-10-06 NOTE — TELEPHONE ENCOUNTER
Patient's mirena IUD has been disposed of in the office due to the device expiring on Oct. 2023. Lot # [id number]

## 2025-02-27 SDOH — HEALTH STABILITY: PHYSICAL HEALTH: ON AVERAGE, HOW MANY MINUTES DO YOU ENGAGE IN EXERCISE AT THIS LEVEL?: 30 MIN

## 2025-02-27 SDOH — HEALTH STABILITY: PHYSICAL HEALTH: ON AVERAGE, HOW MANY DAYS PER WEEK DO YOU ENGAGE IN MODERATE TO STRENUOUS EXERCISE (LIKE A BRISK WALK)?: 4 DAYS

## 2025-02-28 ENCOUNTER — OFFICE VISIT (OUTPATIENT)
Dept: PRIMARY CARE CLINIC | Age: 25
End: 2025-02-28
Payer: COMMERCIAL

## 2025-02-28 VITALS
TEMPERATURE: 97.8 F | HEART RATE: 68 BPM | HEIGHT: 70 IN | OXYGEN SATURATION: 98 % | BODY MASS INDEX: 29.2 KG/M2 | WEIGHT: 204 LBS | SYSTOLIC BLOOD PRESSURE: 116 MMHG | DIASTOLIC BLOOD PRESSURE: 74 MMHG

## 2025-02-28 DIAGNOSIS — Z11.59 NEED FOR HEPATITIS C SCREENING TEST: ICD-10-CM

## 2025-02-28 DIAGNOSIS — R63.5 WEIGHT GAIN: ICD-10-CM

## 2025-02-28 DIAGNOSIS — Z01.419 WOMEN'S ANNUAL ROUTINE GYNECOLOGICAL EXAMINATION: ICD-10-CM

## 2025-02-28 DIAGNOSIS — R53.83 FATIGUE, UNSPECIFIED TYPE: ICD-10-CM

## 2025-02-28 DIAGNOSIS — Z76.89 ENCOUNTER TO ESTABLISH CARE: ICD-10-CM

## 2025-02-28 PROBLEM — Z34.90 PREGNANCY: Status: RESOLVED | Noted: 2018-03-09 | Resolved: 2025-02-28

## 2025-02-28 PROBLEM — O26.899 ABDOMINAL PAIN AFFECTING PREGNANCY: Status: RESOLVED | Noted: 2018-01-05 | Resolved: 2025-02-28

## 2025-02-28 PROBLEM — R10.9 ABDOMINAL PAIN AFFECTING PREGNANCY: Status: RESOLVED | Noted: 2018-01-05 | Resolved: 2025-02-28

## 2025-02-28 PROBLEM — Z34.90 TERM PREGNANCY: Status: RESOLVED | Noted: 2018-03-20 | Resolved: 2025-02-28

## 2025-02-28 LAB
25(OH)D3 SERPL-MCNC: 33.3 NG/ML
ALBUMIN SERPL-MCNC: 4.3 G/DL (ref 3.5–5.2)
ALP SERPL-CCNC: 100 U/L (ref 35–104)
ALT SERPL-CCNC: 14 U/L (ref 5–33)
ANION GAP SERPL CALCULATED.3IONS-SCNC: 15 MMOL/L (ref 8–16)
AST SERPL-CCNC: 19 U/L (ref 5–32)
BASOPHILS # BLD: 0 K/UL (ref 0–0.2)
BASOPHILS NFR BLD: 0.3 % (ref 0–1)
BILIRUB SERPL-MCNC: 0.2 MG/DL (ref 0.2–1.2)
BUN SERPL-MCNC: 16 MG/DL (ref 6–20)
CALCIUM SERPL-MCNC: 9.1 MG/DL (ref 8.6–10)
CHLORIDE SERPL-SCNC: 105 MMOL/L (ref 98–107)
CO2 SERPL-SCNC: 23 MMOL/L (ref 22–29)
CREAT SERPL-MCNC: 0.8 MG/DL (ref 0.5–0.9)
EOSINOPHIL # BLD: 0.2 K/UL (ref 0–0.6)
EOSINOPHIL NFR BLD: 3.2 % (ref 0–5)
ERYTHROCYTE [DISTWIDTH] IN BLOOD BY AUTOMATED COUNT: 12.4 % (ref 11.5–14.5)
FERRITIN SERPL-MCNC: 23.5 NG/ML (ref 13–150)
GLUCOSE SERPL-MCNC: 96 MG/DL (ref 70–99)
HCT VFR BLD AUTO: 42 % (ref 37–47)
HCV AB SERPL QL IA: NORMAL
HGB BLD-MCNC: 14.1 G/DL (ref 12–16)
IMM GRANULOCYTES # BLD: 0 K/UL
IRON SATN MFR SERPL: 16 % (ref 15–50)
IRON SERPL-MCNC: 60 UG/DL (ref 37–145)
LYMPHOCYTES # BLD: 1.7 K/UL (ref 1.1–4.5)
LYMPHOCYTES NFR BLD: 27.9 % (ref 20–40)
MCH RBC QN AUTO: 29.7 PG (ref 27–31)
MCHC RBC AUTO-ENTMCNC: 33.6 G/DL (ref 33–37)
MCV RBC AUTO: 88.6 FL (ref 81–99)
MONOCYTES # BLD: 0.3 K/UL (ref 0–0.9)
MONOCYTES NFR BLD: 5.7 % (ref 0–10)
NEUTROPHILS # BLD: 3.7 K/UL (ref 1.5–7.5)
NEUTS SEG NFR BLD: 62.7 % (ref 50–65)
PLATELET # BLD AUTO: 270 K/UL (ref 130–400)
PMV BLD AUTO: 10.5 FL (ref 9.4–12.3)
POTASSIUM SERPL-SCNC: 3.9 MMOL/L (ref 3.5–5.1)
PROT SERPL-MCNC: 7.6 G/DL (ref 6.4–8.3)
RBC # BLD AUTO: 4.74 M/UL (ref 4.2–5.4)
SODIUM SERPL-SCNC: 143 MMOL/L (ref 136–145)
T4 FREE SERPL-MCNC: 1.13 NG/DL (ref 0.93–1.7)
TIBC SERPL-MCNC: 374 UG/DL (ref 250–400)
TSH SERPL DL<=0.005 MIU/L-ACNC: 2.68 UIU/ML (ref 0.27–4.2)
VIT B12 SERPL-MCNC: 882 PG/ML (ref 232–1245)
WBC # BLD AUTO: 6 K/UL (ref 4.8–10.8)

## 2025-02-28 PROCEDURE — G8427 DOCREV CUR MEDS BY ELIG CLIN: HCPCS | Performed by: NURSE PRACTITIONER

## 2025-02-28 PROCEDURE — G8419 CALC BMI OUT NRM PARAM NOF/U: HCPCS | Performed by: NURSE PRACTITIONER

## 2025-02-28 PROCEDURE — 99204 OFFICE O/P NEW MOD 45 MIN: CPT | Performed by: NURSE PRACTITIONER

## 2025-02-28 PROCEDURE — 1036F TOBACCO NON-USER: CPT | Performed by: NURSE PRACTITIONER

## 2025-02-28 SDOH — ECONOMIC STABILITY: FOOD INSECURITY: WITHIN THE PAST 12 MONTHS, THE FOOD YOU BOUGHT JUST DIDN'T LAST AND YOU DIDN'T HAVE MONEY TO GET MORE.: NEVER TRUE

## 2025-02-28 SDOH — ECONOMIC STABILITY: FOOD INSECURITY: WITHIN THE PAST 12 MONTHS, YOU WORRIED THAT YOUR FOOD WOULD RUN OUT BEFORE YOU GOT MONEY TO BUY MORE.: NEVER TRUE

## 2025-02-28 ASSESSMENT — PATIENT HEALTH QUESTIONNAIRE - PHQ9
8. MOVING OR SPEAKING SO SLOWLY THAT OTHER PEOPLE COULD HAVE NOTICED. OR THE OPPOSITE, BEING SO FIGETY OR RESTLESS THAT YOU HAVE BEEN MOVING AROUND A LOT MORE THAN USUAL: NOT AT ALL
SUM OF ALL RESPONSES TO PHQ QUESTIONS 1-9: 2
SUM OF ALL RESPONSES TO PHQ QUESTIONS 1-9: 2
1. LITTLE INTEREST OR PLEASURE IN DOING THINGS: NOT AT ALL
SUM OF ALL RESPONSES TO PHQ9 QUESTIONS 1 & 2: 0
2. FEELING DOWN, DEPRESSED OR HOPELESS: NOT AT ALL
7. TROUBLE CONCENTRATING ON THINGS, SUCH AS READING THE NEWSPAPER OR WATCHING TELEVISION: NOT AT ALL
5. POOR APPETITE OR OVEREATING: NOT AT ALL
SUM OF ALL RESPONSES TO PHQ QUESTIONS 1-9: 2
10. IF YOU CHECKED OFF ANY PROBLEMS, HOW DIFFICULT HAVE THESE PROBLEMS MADE IT FOR YOU TO DO YOUR WORK, TAKE CARE OF THINGS AT HOME, OR GET ALONG WITH OTHER PEOPLE: NOT DIFFICULT AT ALL
9. THOUGHTS THAT YOU WOULD BE BETTER OFF DEAD, OR OF HURTING YOURSELF: NOT AT ALL
4. FEELING TIRED OR HAVING LITTLE ENERGY: MORE THAN HALF THE DAYS
SUM OF ALL RESPONSES TO PHQ QUESTIONS 1-9: 2
3. TROUBLE FALLING OR STAYING ASLEEP: NOT AT ALL
6. FEELING BAD ABOUT YOURSELF - OR THAT YOU ARE A FAILURE OR HAVE LET YOURSELF OR YOUR FAMILY DOWN: NOT AT ALL

## 2025-02-28 ASSESSMENT — ENCOUNTER SYMPTOMS
SHORTNESS OF BREATH: 0
WHEEZING: 0
COUGH: 0
DIARRHEA: 0
ABDOMINAL PAIN: 0
SORE THROAT: 0
NAUSEA: 0
CHEST TIGHTNESS: 0

## 2025-02-28 NOTE — PROGRESS NOTES
Ms.Hannah DAVID Padgett is a 25 y.o. female who presents today for  Chief Complaint   Patient presents with    Established New Doctor     Wants to have labs to check her thyroid.        HPI:  Here to reestablish care.  Last seen by me in 2019.  No PCP in the interim.    She was hospitalized in September 2021 at [facility] with COVID, pneumonia, acute respiratory failure.  She developed a right pneumothorax requiring chest tube.  She was managed briefly by cardiothoracic surgery (Dr. Martinez).  She had staph lugdunensis on blood culture and MRSA on bronchoscopy culture and was managed briefly by infectious disease (Dr. Muñoz).  She states prior to this she vaped briefly but quit after discharge.  No subsequent respiratory issues.    She does occasionally have a pressure-like pain in midsternal and right chest occasionally when she gets sick with congestion and cough but does not persist.  No shortness of breath.    She needs GYN referral.  Overdue for Pap.    Has history of anxiety but has been controlled without medication.  Currently stable.  She has taken Prozac, Zoloft, Pristiq in the past.    Her main complaint today is fatigue, weight gain, dry skin.  She is concerned about thyroid issue.  Symptoms have been ongoing for 3 to 4 months.  She states she has been exercising in eating more healthy but continues to gain weight.  She has a FHx of Graves' disease in father and Hashimoto thyroiditis and brother.    Review of Systems   Constitutional:  Positive for fatigue.   HENT:  Negative for congestion, ear pain and sore throat.    Respiratory:  Negative for cough, chest tightness, shortness of breath and wheezing.    Cardiovascular:  Negative for chest pain.   Gastrointestinal:  Negative for abdominal pain, diarrhea and nausea.   Musculoskeletal:  Negative for arthralgias and myalgias.   Skin:  Negative for rash.   Neurological:  Negative for dizziness and light-headedness.       Past Medical History:   Diagnosis Date 
23-Oct-2022 17:05

## 2025-05-02 ENCOUNTER — OFFICE VISIT (OUTPATIENT)
Dept: OBGYN CLINIC | Age: 25
End: 2025-05-02
Payer: COMMERCIAL

## 2025-05-02 VITALS
HEART RATE: 73 BPM | DIASTOLIC BLOOD PRESSURE: 60 MMHG | SYSTOLIC BLOOD PRESSURE: 94 MMHG | WEIGHT: 199 LBS | BODY MASS INDEX: 28.55 KG/M2

## 2025-05-02 DIAGNOSIS — Z01.419 WELL WOMAN EXAM WITH ROUTINE GYNECOLOGICAL EXAM: ICD-10-CM

## 2025-05-02 DIAGNOSIS — Z12.39 ENCOUNTER FOR SCREENING BREAST EXAMINATION: ICD-10-CM

## 2025-05-02 DIAGNOSIS — Z76.89 ENCOUNTER TO ESTABLISH CARE: Primary | ICD-10-CM

## 2025-05-02 DIAGNOSIS — Z12.4 SCREENING FOR CERVICAL CANCER: ICD-10-CM

## 2025-05-02 PROCEDURE — 99385 PREV VISIT NEW AGE 18-39: CPT | Performed by: NURSE PRACTITIONER

## 2025-05-02 ASSESSMENT — ENCOUNTER SYMPTOMS
EYES NEGATIVE: 1
CONSTIPATION: 0
GASTROINTESTINAL NEGATIVE: 1
DIARRHEA: 0
RESPIRATORY NEGATIVE: 1
ALLERGIC/IMMUNOLOGIC NEGATIVE: 1

## 2025-05-02 NOTE — PROGRESS NOTES
Pt presents today to establish care and for pap smear and breast exam.      Mammo:NA  Pap smear:been a while   Contraception:NA     P:2  Ab:0  Bone density:NA  Colonoscopy:NA  
      MEDICATIONS:  No orders of the defined types were placed in this encounter.      ORDERS:  Orders Placed This Encounter   Procedures    PAP SMEAR       PLAN:  Pap collected  Gave info on [place] ovarian cancer screening and [employer] testing for pt to consider  Start PNV OTC when TTC, pt states understanding    Patient Instructions   Patient Education       Breast Self-Exam: Care Instructions  Overview     A breast self-exam means regularly checking your breasts for lumps or changes. Depending on your health history, your doctor may tell you to do breast self-exams. These help you learn how your breasts normally look and feel. Most breast problems or changes are not because of cancer.  Breast self-exams don't take the place of a mammogram. Having regular breast exams by your doctor and mammograms can improve your chances of finding any problems with your breasts.  If you notice a change in your breast, tell your doctor. This may include any new lump, nipple discharge, or redness or a change in the skin's usual color.  Follow-up care is a key part of your treatment and safety. Be sure to make and go to all appointments, and call your doctor if you are having problems. It's also a good idea to know your test results and keep a list of the medicines you take.  How do you do a breast self-exam?  Try to set a time each month to do a step-by-step breast self-exam. If you have a menstrual period, the best time to check your breasts is usually 1 week after your period begins. Your breasts should not be tender then. If you don't have periods, you might do your exam on a day of the month that is easy to remember.  To check your breasts:  Remove all your clothes above the waist and lie down. When you are lying down, your breast tissue spreads evenly over your chest wall, which makes it easier to feel all your breast tissue.  Use the pads--not the fingertips--of the 3 middle fingers of your left hand to check your right breast.

## 2025-05-06 ENCOUNTER — RESULTS FOLLOW-UP (OUTPATIENT)
Dept: OBGYN CLINIC | Age: 25
End: 2025-05-06

## 2025-05-12 ENCOUNTER — APPOINTMENT (OUTPATIENT)
Dept: GENERAL RADIOLOGY | Facility: HOSPITAL | Age: 25
End: 2025-05-12

## 2025-05-12 PROCEDURE — 73610 X-RAY EXAM OF ANKLE: CPT

## 2025-08-29 ENCOUNTER — OFFICE VISIT (OUTPATIENT)
Dept: PRIMARY CARE CLINIC | Age: 25
End: 2025-08-29
Payer: COMMERCIAL

## 2025-08-29 VITALS
OXYGEN SATURATION: 98 % | HEART RATE: 83 BPM | SYSTOLIC BLOOD PRESSURE: 130 MMHG | DIASTOLIC BLOOD PRESSURE: 74 MMHG | HEIGHT: 70 IN | TEMPERATURE: 98.3 F | BODY MASS INDEX: 29.63 KG/M2 | WEIGHT: 207 LBS

## 2025-08-29 DIAGNOSIS — Z32.00 POSSIBLE PREGNANCY: ICD-10-CM

## 2025-08-29 DIAGNOSIS — R53.83 FATIGUE, UNSPECIFIED TYPE: ICD-10-CM

## 2025-08-29 DIAGNOSIS — R10.12 LEFT UPPER QUADRANT ABDOMINAL PAIN: ICD-10-CM

## 2025-08-29 DIAGNOSIS — Z00.00 WELL ADULT EXAM: ICD-10-CM

## 2025-08-29 LAB
ALBUMIN SERPL-MCNC: 4.2 G/DL (ref 3.5–5.2)
ALP SERPL-CCNC: 93 U/L (ref 35–104)
ALT SERPL-CCNC: 12 U/L (ref 10–35)
ANION GAP SERPL CALCULATED.3IONS-SCNC: 11 MMOL/L (ref 8–16)
AST SERPL-CCNC: 20 U/L (ref 10–35)
BASOPHILS # BLD: 0 K/UL (ref 0–0.2)
BASOPHILS NFR BLD: 0.3 % (ref 0–1)
BILIRUB SERPL-MCNC: 0.2 MG/DL (ref 0.2–1.2)
BUN SERPL-MCNC: 13 MG/DL (ref 6–20)
CALCIUM SERPL-MCNC: 9.5 MG/DL (ref 8.6–10)
CHLORIDE SERPL-SCNC: 105 MMOL/L (ref 98–107)
CO2 SERPL-SCNC: 23 MMOL/L (ref 22–29)
CREAT SERPL-MCNC: 0.6 MG/DL (ref 0.5–0.9)
EOSINOPHIL # BLD: 0.3 K/UL (ref 0–0.6)
EOSINOPHIL NFR BLD: 3.1 % (ref 0–5)
ERYTHROCYTE [DISTWIDTH] IN BLOOD BY AUTOMATED COUNT: 12.8 % (ref 11.5–14.5)
GLUCOSE SERPL-MCNC: 89 MG/DL (ref 70–99)
HCG SERPL QL: NEGATIVE
HCT VFR BLD AUTO: 42.8 % (ref 37–47)
HGB BLD-MCNC: 13.7 G/DL (ref 12–16)
IMM GRANULOCYTES # BLD: 0 K/UL
LIPASE SERPL-CCNC: 39 U/L (ref 13–60)
LYMPHOCYTES # BLD: 2.4 K/UL (ref 1.1–4.5)
LYMPHOCYTES NFR BLD: 30.6 % (ref 20–40)
MCH RBC QN AUTO: 29 PG (ref 27–31)
MCHC RBC AUTO-ENTMCNC: 32 G/DL (ref 33–37)
MCV RBC AUTO: 90.7 FL (ref 81–99)
MONOCYTES # BLD: 0.5 K/UL (ref 0–0.9)
MONOCYTES NFR BLD: 6 % (ref 0–10)
NEUTROPHILS # BLD: 4.7 K/UL (ref 1.5–7.5)
NEUTS SEG NFR BLD: 59.7 % (ref 50–65)
PLATELET # BLD AUTO: 268 K/UL (ref 130–400)
PMV BLD AUTO: 10.2 FL (ref 9.4–12.3)
POTASSIUM SERPL-SCNC: 3.9 MMOL/L (ref 3.5–5.1)
PROT SERPL-MCNC: 7.4 G/DL (ref 6.4–8.3)
RBC # BLD AUTO: 4.72 M/UL (ref 4.2–5.4)
SODIUM SERPL-SCNC: 139 MMOL/L (ref 136–145)
T4 FREE SERPL-MCNC: 1.04 NG/DL (ref 0.93–1.7)
TSH SERPL DL<=0.005 MIU/L-ACNC: 3.35 UIU/ML (ref 0.27–4.2)
WBC # BLD AUTO: 7.9 K/UL (ref 4.8–10.8)

## 2025-08-29 PROCEDURE — 99395 PREV VISIT EST AGE 18-39: CPT | Performed by: NURSE PRACTITIONER

## 2025-08-29 ASSESSMENT — ENCOUNTER SYMPTOMS
DIARRHEA: 0
SHORTNESS OF BREATH: 0
NAUSEA: 1
WHEEZING: 0
CHEST TIGHTNESS: 0
COUGH: 0
SORE THROAT: 0
ABDOMINAL PAIN: 1